# Patient Record
Sex: MALE | Race: WHITE | Employment: FULL TIME | URBAN - METROPOLITAN AREA
[De-identification: names, ages, dates, MRNs, and addresses within clinical notes are randomized per-mention and may not be internally consistent; named-entity substitution may affect disease eponyms.]

---

## 2017-03-30 ENCOUNTER — OFFICE VISIT (OUTPATIENT)
Dept: PHARMACY | Facility: CLINIC | Age: 62
End: 2017-03-30
Payer: COMMERCIAL

## 2017-03-30 VITALS
BODY MASS INDEX: 37.69 KG/M2 | SYSTOLIC BLOOD PRESSURE: 118 MMHG | OXYGEN SATURATION: 98 % | HEART RATE: 89 BPM | WEIGHT: 253.4 LBS | DIASTOLIC BLOOD PRESSURE: 68 MMHG

## 2017-03-30 DIAGNOSIS — E53.8 VITAMIN B12 DEFICIENCY (NON ANEMIC): ICD-10-CM

## 2017-03-30 DIAGNOSIS — E11.9 TYPE 2 DIABETES MELLITUS WITHOUT COMPLICATION, WITHOUT LONG-TERM CURRENT USE OF INSULIN (H): ICD-10-CM

## 2017-03-30 DIAGNOSIS — I10 HYPERTENSION GOAL BP (BLOOD PRESSURE) < 140/90: ICD-10-CM

## 2017-03-30 DIAGNOSIS — E78.5 HYPERLIPIDEMIA WITH TARGET LDL LESS THAN 100: ICD-10-CM

## 2017-03-30 DIAGNOSIS — E55.9 VITAMIN D DEFICIENCY: ICD-10-CM

## 2017-03-30 DIAGNOSIS — E11.9 TYPE 2 DIABETES MELLITUS WITHOUT COMPLICATION, WITHOUT LONG-TERM CURRENT USE OF INSULIN (H): Primary | ICD-10-CM

## 2017-03-30 LAB
ALBUMIN SERPL-MCNC: 4.4 G/DL (ref 3.4–5)
ALP SERPL-CCNC: 73 U/L (ref 40–150)
ALT SERPL W P-5'-P-CCNC: 29 U/L (ref 0–70)
ANION GAP SERPL CALCULATED.3IONS-SCNC: 9 MMOL/L (ref 3–14)
AST SERPL W P-5'-P-CCNC: 13 U/L (ref 0–45)
BILIRUB SERPL-MCNC: 0.5 MG/DL (ref 0.2–1.3)
BUN SERPL-MCNC: 17 MG/DL (ref 7–30)
CALCIUM SERPL-MCNC: 9.5 MG/DL (ref 8.5–10.1)
CHLORIDE SERPL-SCNC: 105 MMOL/L (ref 94–109)
CHOLEST SERPL-MCNC: 231 MG/DL
CO2 SERPL-SCNC: 23 MMOL/L (ref 20–32)
CREAT SERPL-MCNC: 0.89 MG/DL (ref 0.66–1.25)
CREAT UR-MCNC: 58 MG/DL
GFR SERPL CREATININE-BSD FRML MDRD: 87 ML/MIN/1.7M2
GLUCOSE SERPL-MCNC: 170 MG/DL (ref 70–99)
HBA1C MFR BLD: 8.2 % (ref 4.3–6)
HDLC SERPL-MCNC: 47 MG/DL
LDLC SERPL CALC-MCNC: 158 MG/DL
MICROALBUMIN UR-MCNC: 11 MG/L
MICROALBUMIN/CREAT UR: 18.56 MG/G CR (ref 0–17)
NONHDLC SERPL-MCNC: 184 MG/DL
POTASSIUM SERPL-SCNC: 4.6 MMOL/L (ref 3.4–5.3)
PROT SERPL-MCNC: 8.1 G/DL (ref 6.8–8.8)
SODIUM SERPL-SCNC: 137 MMOL/L (ref 133–144)
TRIGL SERPL-MCNC: 128 MG/DL
TSH SERPL DL<=0.005 MIU/L-ACNC: 1.03 MU/L (ref 0.4–4)
VIT B12 SERPL-MCNC: 421 PG/ML (ref 193–986)

## 2017-03-30 PROCEDURE — 80053 COMPREHEN METABOLIC PANEL: CPT | Performed by: FAMILY MEDICINE

## 2017-03-30 PROCEDURE — 80061 LIPID PANEL: CPT | Performed by: FAMILY MEDICINE

## 2017-03-30 PROCEDURE — 82306 VITAMIN D 25 HYDROXY: CPT | Performed by: FAMILY MEDICINE

## 2017-03-30 PROCEDURE — 82043 UR ALBUMIN QUANTITATIVE: CPT | Performed by: FAMILY MEDICINE

## 2017-03-30 PROCEDURE — 83036 HEMOGLOBIN GLYCOSYLATED A1C: CPT | Performed by: FAMILY MEDICINE

## 2017-03-30 PROCEDURE — 99607 MTMS BY PHARM ADDL 15 MIN: CPT | Performed by: PHARMACIST

## 2017-03-30 PROCEDURE — 99605 MTMS BY PHARM NP 15 MIN: CPT | Performed by: PHARMACIST

## 2017-03-30 PROCEDURE — 82607 VITAMIN B-12: CPT | Performed by: FAMILY MEDICINE

## 2017-03-30 PROCEDURE — 36415 COLL VENOUS BLD VENIPUNCTURE: CPT | Performed by: FAMILY MEDICINE

## 2017-03-30 PROCEDURE — 84443 ASSAY THYROID STIM HORMONE: CPT | Performed by: FAMILY MEDICINE

## 2017-03-30 RX ORDER — PIOGLITAZONEHYDROCHLORIDE 15 MG/1
15 TABLET ORAL DAILY
Qty: 90 TABLET | Refills: 1 | Status: SHIPPED | OUTPATIENT
Start: 2017-03-30 | End: 2018-05-03 | Stop reason: DRUGHIGH

## 2017-03-30 NOTE — PROGRESS NOTES
SUBJECTIVE/OBJECTIVE:                                                    Bigg Lilly is a 61 year old male coming in for a follow-up visit for Medication Therapy Management.  He was referred to me from Dr. Owen.    Chief Complaint: Follow up from our visit on  8-18-16.  Here for fasting labs  --6 months f/up.         Personal Healthcare Goals:  Ultimate goal is lose enough weight and eat healthy enough and exercise daily to work his way off all his current RX medications.! < 250lbs. is weight goal.     Tobacco: No tobacco use     Medication Adherence:  No issues reported by patient    Diabetes:  Pt currently taking ER metformin 500mg.-2 bid + Jardiance 10mg /day  .  Pt is not experiencing side effects: increased thirst --drinking more water   SMBG: no meter yet-declines today    Ranges: n/a     Symptoms of low blood sugar? none. Frequency of hypoglycemia? never.  Recent symptoms of high blood sugar? none.  Eye exam: up to date  Foot exam: due  Microalbumin is wnl and he is on an ace inhibitor.  Aspirin: 81mg asa daily now  He declines flu this year.  Diet/Exercise: walking daily, sometimes weights at [employer]--Breakfast Eggs, lunch: salads with chicken, or sandwiches, Dinner: tacos, chicken, green beans, No bedtime snack.   Eliminated sugar drinks, diet is same. Drinking more water!    Bread is his weak link.     Family membership at [email or web address]--daniela is going with him,    They go 1-2 x week - 1.5-2 hrs. - walking treadmill , mild strength training --upper body stuff.           Hypertension: Current medications include lotrel 10-20qam.  Patient does not self-monitor BP.  Patient reports no current medication side effects.        Hyperlipidemia: Current therapy includes diet and weight loss.  We discussed DM statin lipid guidelines --pt. Does not want to start any lipid meds at this time. .     Vitamin D3: level was 674.-he takes 2,000 iu's /day .  Vitamin D3 helps support bone health, strengthen the  immune system, and may improve mood and lessen muscle aches/pains if deficient.  Vitamin B12: level was 62-he takes 500mcg./day otc pill. Vitamin B12 helps support memory and lessens fatigue.              Current labs include:  BP Readings from Last 3 Encounters:   03/30/17 118/68   09/23/16 126/70   08/18/16 132/76         Lab Results   Component Value Date    A1C 8.2 03/30/2017    A1C 7.5 09/23/2016    A1C 7.4 08/18/2016    A1C 8.3 04/21/2016    A1C 7.3 11/02/2015           Cholesterol   Date Value Ref Range Status   2/6/2015 179  <200 mg/dL Final      LDL Cholesterol is the primary guide to therapy.   The NCEP recommends further evaluation of: patients with cholesterol greater   than 200 mg/dL if additional risk factors are present, cholesterol greater   than   240 mg/dL, triglycerides greater than 150 mg/dL, or HDL less than 40 mg/dL.     12/2/2013 192  0 - 200 mg/dL Final      LDL Cholesterol is the primary guide to therapy.       The NCEP recommends further evaluation of: patients with cholesterol greater       than 200 mg/dL if additional risk factors are present, cholesterol greater       than       240 mg/dL, triglycerides greater than 150 mg/dL, or HDL less than 40 mg/dL.     HDL Cholesterol   Date Value Ref Range Status   2/6/2015 44  >40 mg/dL Final   12/2/2013 43  40 - 110 mg/dL Final     LDL Cholesterol Calculated   Date Value Ref Range Status   2/6/2015 110  0 - 129 mg/dL Final      LDL Cholesterol is the primary guide to therapy: LDL-cholesterol goal in high   risk patients is <100 mg/dL and in very high risk patients is <70 mg/dL.     12/2/2013 110  0 - 129 mg/dL Final      LDL Cholesterol is the primary guide to therapy: LDL-cholesterol goal in high       risk patients is <100 mg/dL and in very high risk patients is <70 mg/dL.     Triglycerides   Date Value Ref Range Status   2/6/2015 127  0 - 150 mg/dL Final      Fasting specimen   12/2/2013 198* 0 - 150 mg/dL Final     Cholesterol/HDL Ratio    Date Value Ref Range Status   2/6/2015 4.1  0.0 - 5.0 Final   12/2/2013 4.5  0.0 - 5.0 Final     Cholesterol   Date Value Ref Range Status   09/23/2016 224 (H) <200 mg/dL Final     Comment:     Desirable:       <200 mg/dl   04/21/2016 197 <200 mg/dL Final     HDL Cholesterol   Date Value Ref Range Status   09/23/2016 43 >39 mg/dL Final   04/21/2016 43 >39 mg/dL Final     LDL Cholesterol Calculated   Date Value Ref Range Status   09/23/2016 150 (H) <100 mg/dL Final     Comment:     Above desirable:  100-129 mg/dl   Borderline High:  130-159 mg/dL   High:             160-189 mg/dL   Very high:       >189 mg/dl     04/21/2016 128 (H) <100 mg/dL Final     Comment:     Above desirable:  100-129 mg/dl   Borderline High:  130-159 mg/dL   High:             160-189 mg/dL   Very high:       >189 mg/dl       Triglycerides   Date Value Ref Range Status   09/23/2016 155 (H) <150 mg/dL Final     Comment:     Borderline high:  150-199 mg/dl   High:             200-499 mg/dl   Very high:       >499 mg/dl   Fasting specimen     04/21/2016 128 <150 mg/dL Final     Comment:     Fasting specimen     Cholesterol/HDL Ratio   Date Value Ref Range Status   11/02/2015 4.4 0.0 - 5.0 Final   06/12/2015 4.5 0.0 - 5.0 Final         MICROL        9   4/21/2016  MICROALBUMIN     6.56   4/21/2016        Last Basic Metabolic Panel:  NA      139   6/7/2016   POTASSIUM      4.9   6/7/2016  CHLORIDE      106   6/7/2016  JUAN      9.2   6/7/2016  CO2       26   6/7/2016  BUN       12   6/7/2016  CR     0.87   6/7/2016  GLC      203   6/7/2016         GFR Estimate   Date Value Range Status   12/2/2013 85  >60 mL/min/1.7m2 Final   9/12/2012 85  >60 mL/min/1.7m2 Final   8/3/2009 82  >60 mL/min/1.7m2 Final     GFR Estimate If Black   Date Value Range Status   12/2/2013 >90  >60 mL/min/1.7m2 Final   9/12/2012 >90  >60 mL/min/1.7m2 Final   8/3/2009 >90  >60 mL/min/1.7m2 Final       TSH   Date Value Range Status   12/2/2013 1.85  0.4 - 5.0 mU/L Final    ]    /68  Pulse 89  Wt 253 lb 6.4 oz (114.9 kg)  SpO2 98%  BMI 37.69 kg/m2    Most Recent Immunizations   Administered Date(s) Administered     TD (ADULT, 7+) 04/14/2000     TDAP Vaccine (Boostrix) 04/23/2014       ASSESSMENT:                                                    Current medications were reviewed with him today.      Medication Adherence: Issues identified       Diabetes: Needs Improvement. Patient is not meeting A1c goal of < 7%. Self monitoring of blood glucose is not something he is interested in doing. Pt would benefit from Metformin :  stay on the same dose.  Actos:  Start at dose : 15mg. /day .  SGLT2 inhibitor (Jardiance) :  stay on the same dose.  Increased exercise as tolerated  Updated Hemoglobin A1c--8.2% today .   Weight loss recommended--see plan . Less bread and carbs.         Hypertension: Stable. Patient is meeting BP goal of < 140/90mmHg.       Hyperlipidemia: Pt. Declines any statin drug at this time , lipids all pretty good--rechecking lab today result is pending.     Vitamin D3: is not at goal of 50-75ng/mL. Pt would benefit from vitamin D3 lab recheck today --result is pending.  Vitamin B12: is at goal of 600-1000pg/mL. Pt would benefit from vitamin B12 lab recheck today -result is pending.      PLAN:                                                            1.  A1c today is = 8.2% .     Lets keep you on Metformin and Jardiance as is --lets ADD once daily Pioglitazone 15mg. To your regimen.     Keep working on low-carb diet(cut bread) , stay active , watch weight--check scale daily !     2. Please watch my chart for other lab results/plan.       Next MTM visit:  3 months - Thursday July 13th at 11;30 am for A1c recheck.             To schedule another MTM appointment, please call the clinic directly or you may call the MTM scheduling line at [phone] or toll-free at [phone].     My Clinical Pharmacist's contact information:                                                       It was a pleasure seeing you today!  Please feel free to contact me with any questions or concerns you have.      Mally Oakley Rph.  Medication Therapy Management Provider  [phone]    You may receive a survey about the MTM services you received.  I would appreciate your feedback to help me serve you better in the future. Please fill it out and return it when you can. Your comments will be anonymous.

## 2017-03-30 NOTE — MR AVS SNAPSHOT
After Visit Summary   3/30/2017    Bigg Lilly    MRN: [id number]           Patient Information     Date Of Birth          1955        Visit Information        Provider Department      3/30/2017 11:30 AM Malyl Oakley RPH [facility]        Today's Diagnoses     Type 2 diabetes mellitus without complication, without long-term current use of insulin (H)    -  1    Hyperlipidemia with target LDL less than 100        Vitamin D deficiency        Vitamin B12 deficiency (non anemic)          Care Instructions    Recommendations from today's MT visit:                                                        1.  A1c today is = 8.2% .     Lets keep you on Metformin and Jardiance as is --lets ADD once daily Pioglitazone 15mg. To your regimen.             Keep working on low-carb diet(cut bread) , stay active , watch weight--check scale daily !           Next MTM visit:  3 months - Thursday July 13th at 11;30 am for A1c recheck.     To schedule another MT appointment, please call the clinic directly or you may call the MTM scheduling line at [phone] or toll-free at [phone].     My Clinical Pharmacist's contact information:                                                      It was a pleasure seeing you today!  Please feel free to contact me with any questions or concerns you have.      Mally Oakley Rph.  Medication Therapy Management Provider  [phone]      You may receive a survey about the [facility] services you received.  I would appreciate your feedback to help me serve you better in the future. Please fill it out and return it when you can. Your comments will be anonymous.      My healthcare goals:                                                                    Follow-ups after your visit        Your next 10 appointments already scheduled     Jul 13, 2017 11:30 AM CDT   SHORT with Mally Oakley RPH   [facility] ([facility] [address] [facility] [address] [phone]              Who to contact     If you have questions or need follow up information about today's clinic visit or your schedule please contact [facility] MTM directly at [phone].  Normal or non-critical lab and imaging results will be communicated to you by MyChart, letter or phone within 4 business days after the clinic has received the results. If you do not hear from us within 7 days, please contact the clinic through [employer] or phone. If you have a critical or abnormal lab result, we will notify you by phone as soon as possible.  Submit refill requests through [employer] or call your pharmacy and they will forward the refill request to us. Please allow 3 business days for your refill to be completed.          Additional Information About Your Visit        MyChart Information     [employer] gives you secure access to your electronic health record. If you see a primary care provider, you can also send messages to your care team and make appointments. If you have questions, please call your primary care clinic.  If you do not have a primary care provider, please call [phone] and they will assist you.        Care EveryWhere ID     This is your Care EveryWhere ID. This could be used by other organizations to access your [place] medical records  [id number]        Your Vitals Were     Pulse Pulse Oximetry BMI (Body Mass Index)             89 98% 37.69 kg/m2          Blood Pressure from Last 3 Encounters:   03/30/17 118/68   09/23/16 126/70   08/18/16 132/76    Weight from Last 3 Encounters:   03/30/17 253 lb 6.4 oz (114.9 kg)   09/23/16 245 lb (111.1 kg)   08/18/16 253 lb (114.8 kg)                 Today's Medication Changes          These changes are accurate as of: 3/30/17 12:36 PM.  If you have any questions, ask your nurse or doctor.               Start taking these medicines.        Dose/Directions    pioglitazone 15 MG  tablet   Commonly known as:  ACTOS   Used for:  Type 2 diabetes mellitus without complication, without long-term current use of insulin (H)   Started by:  Mally Oakley RPH        Dose:  15 mg   Take 1 tablet (15 mg) by mouth daily   Quantity:  90 tablet   Refills:  1            Where to get your medicines      These medications were sent to [id number] - [place], [place] - [address], [facility] [address]     Phone:  [phone]     pioglitazone 15 MG tablet                Primary Care Provider Office Phone # Fax #    Ramon Owen [phone]       [address] [address]        Thank you!     Thank you for choosing [facility]  for your care. Our goal is always to provide you with excellent care. Hearing back from our patients is one way we can continue to improve our services. Please take a few minutes to complete the written survey that you may receive in the mail after your visit with us. Thank you!             Your Updated Medication List - Protect others around you: Learn how to safely use, store and throw away your medicines at www.disposemymeds.org.          This list is accurate as of: 3/30/17 12:36 PM.  Always use your most recent med list.                   Brand Name Dispense Instructions for use    amLODIPine-benazepril 10-20 MG per capsule    LOTREL    90 capsule    Take 1 capsule by mouth daily       aspirin 81 MG tablet      Take 81 mg by mouth daily       cyanocolbalamin 500 MCG tablet    vitamin  B-12     Take 500 mcg by mouth daily       empagliflozin 10 MG Tabs tablet    JARDIANCE    30 tablet    Take 1 tablet (10 mg) by mouth daily       metFORMIN 500 MG 24 hr tablet    GLUCOPHAGE-XR    360 tablet    Take 2 tablets (1,000 mg) by mouth 2 times daily (with meals)       pioglitazone 15 MG tablet    ACTOS    90 tablet    Take 1 tablet (15 mg) by mouth daily       Vitamin D3  2000 UNITS Tabs      Take 1,000 Units by mouth daily

## 2017-03-30 NOTE — PATIENT INSTRUCTIONS
Recommendations from today's MTM visit:                                                        1.  A1c today is = 8.2% .     Lets keep you on Metformin and Jardiance as is --lets ADD once daily Pioglitazone 15mg. To your regimen.       2. Please watch my chart for other lab results/plan.       Keep working on low-carb diet(cut bread) , stay active , watch weight--check scale daily !           Next MTM visit:  3 months - Thursday July 13th at 11;30 am for A1c recheck.     To schedule another MTM appointment, please call the clinic directly or you may call the MTM scheduling line at [phone] or toll-free at [phone].     My Clinical Pharmacist's contact information:                                                      It was a pleasure seeing you today!  Please feel free to contact me with any questions or concerns you have.      Mally Oakley [facility].  Medication Therapy Management Provider  [phone]      You may receive a survey about the MTM services you received.  I would appreciate your feedback to help me serve you better in the future. Please fill it out and return it when you can. Your comments will be anonymous.      My healthcare goals:

## 2017-03-31 LAB — DEPRECATED CALCIDIOL+CALCIFEROL SERPL-MC: 24 UG/L (ref 20–75)

## 2017-03-31 NOTE — PROGRESS NOTES
3-31-17    Sent my chart lab result to patient --increase daily dose now to 2x2,000 iu's/day .    Mally Oakley Rph.  Medication Therapy Management Provider  [phone]

## 2017-05-08 ENCOUNTER — TELEPHONE (OUTPATIENT)
Dept: FAMILY MEDICINE | Facility: CLINIC | Age: 62
End: 2017-05-08

## 2017-05-08 DIAGNOSIS — E11.9 TYPE 2 DIABETES MELLITUS WITHOUT COMPLICATION, WITHOUT LONG-TERM CURRENT USE OF INSULIN (H): Primary | ICD-10-CM

## 2017-05-08 DIAGNOSIS — E66.01 MORBID OBESITY (H): ICD-10-CM

## 2017-05-08 NOTE — TELEPHONE ENCOUNTER
A referral is recommended for health  in regards to diabetes and .  Referral written and sent to health  to contact patient.    Thanks  Swati Márquez/JORGE  [place]

## 2017-05-15 DIAGNOSIS — E11.9 TYPE 2 DIABETES MELLITUS WITHOUT COMPLICATION, WITHOUT LONG-TERM CURRENT USE OF INSULIN (H): Primary | ICD-10-CM

## 2017-05-15 NOTE — LETTER
[facility]  [address] [place] [address]  Phone: [phone]    05/18/17    Bigg Lilly  [address] [facility] [address]      To whom it may concern:     We recently received a call from your pharmacy requesting a refill of your medication.    A review of your chart indicates that an appointment is required with your provider for office visit. Please call the clinic at [phone] to schedule your appointment.    We have authorized one refill of your medication to allow time for you to schedule your appointment.    Taking care of your health is important to us, and ongoing visits with your provider are vital to your care.  We look forward to seeing you in the near future.          Sincerely,      Ramon Owen MD/Karli SOLORIO RN

## 2017-05-15 NOTE — TELEPHONE ENCOUNTER
Pending Prescriptions:                       Disp   Refills    JARDIANCE 10 MG TABS tablet [Pharmacy Med*30 tab*10           Sig: TAKE 1 TABLET (10 MG) BY MOUTH DAILY              Last Written Prescription Date:  4/21/2016  Last Fill Quantity: 30,   # refills: 11  Last Office Visit with [facility] provider: 9/23/2016Dia        Future Office visit:    Next 5 appointments (look out 90 days)     Jul 13, 2017 11:30 AM CDT   SHORT with Mally Oakley RPH   [facility] ([facility])    [address] [facility] [address]   [phone]                   Routing refill request to provider for review/approval because:  Drug not on the [facility] or  Health refill protocol or controlled substance

## 2017-05-16 NOTE — TELEPHONE ENCOUNTER
Next 5 appointments (look out 90 days)     Jul 13, 2017 11:30 AM CDT   SHORT with Mally Oakley RPH   [facility] ([facility])    [address]  [facility] [address] [phone]                   BP Readings from Last 3 Encounters:   03/30/17 118/68   09/23/16 126/70   08/18/16 132/76     Lab Results   Component Value Date    MICROL 11 03/30/2017     Lab Results   Component Value Date    UMALCR 18.56 03/30/2017     Creatinine   Date Value Ref Range Status   03/30/2017 0.89 0.66 - 1.25 mg/dL Final   ]  GFR Estimate   Date Value Ref Range Status   03/30/2017 87 >60 mL/min/1.7m2 Final     Comment:     Non  GFR Calc   06/07/2016 89 >60 mL/min/1.7m2 Final     Comment:     Non  GFR Calc   04/21/2016 89 >60 mL/min/1.7m2 Final     Comment:     Non  GFR Calc     GFR Estimate If Black   Date Value Ref Range Status   03/30/2017 >90   GFR Calc   >60 mL/min/1.7m2 Final   06/07/2016 >90   GFR Calc   >60 mL/min/1.7m2 Final   04/21/2016 >90   GFR Calc   >60 mL/min/1.7m2 Final     Lab Results   Component Value Date    CHOL 231 03/30/2017     Lab Results   Component Value Date    HDL 47 03/30/2017     Lab Results   Component Value Date     03/30/2017     Lab Results   Component Value Date    TRIG 128 03/30/2017     Lab Results   Component Value Date    CHOLHDLRATIO 4.4 11/02/2015     Lab Results   Component Value Date    AST 13 03/30/2017     Lab Results   Component Value Date    ALT 29 03/30/2017     Lab Results   Component Value Date    A1C 8.2 03/30/2017    A1C 7.5 09/23/2016    A1C 7.4 08/18/2016    A1C 8.3 04/21/2016    A1C 7.3 11/02/2015     Potassium   Date Value Ref Range Status   03/30/2017 4.6 3.4 - 5.3 mmol/L Final     Routing refill request to provider for review/approval because:  Requires OV q 6 mo    Karli Lilly RN, BS  Clinical Nurse Triage.

## 2017-05-17 RX ORDER — EMPAGLIFLOZIN 10 MG/1
TABLET, FILM COATED ORAL
Qty: 30 TABLET | Refills: 0 | Status: SHIPPED | OUTPATIENT
Start: 2017-05-17 | End: 2017-07-24

## 2017-06-05 ENCOUNTER — MYC MEDICAL ADVICE (OUTPATIENT)
Dept: PHARMACY | Facility: CLINIC | Age: 62
End: 2017-06-05

## 2017-06-05 DIAGNOSIS — E11.9 TYPE 2 DIABETES MELLITUS WITHOUT COMPLICATION, WITHOUT LONG-TERM CURRENT USE OF INSULIN (H): Primary | ICD-10-CM

## 2017-06-06 ENCOUNTER — MYC MEDICAL ADVICE (OUTPATIENT)
Dept: PHARMACY | Facility: CLINIC | Age: 62
End: 2017-06-06

## 2017-06-06 RX ORDER — DAPAGLIFLOZIN 10 MG/1
10 TABLET, FILM COATED ORAL DAILY
Qty: 90 TABLET | Refills: 1 | Status: SHIPPED | OUTPATIENT
Start: 2017-06-06 | End: 2018-06-18

## 2017-06-06 NOTE — TELEPHONE ENCOUNTER
6-6-17      mt notes now will start Farxiga with rx savings card.     Mally Oakley lesa.  Medication Therapy Management Provider  [phone]

## 2017-06-06 NOTE — TELEPHONE ENCOUNTER
6-6-17      mtm will change patient to Farxiga 10mg /day with rx savings coupon for 0.00$$ copay .    Pt. Needs appt. With dr. seth -yearly exam .    Mally Oakley Rph.  Medication Therapy Management Provider  [phone]

## 2017-06-15 ENCOUNTER — TELEPHONE (OUTPATIENT)
Dept: NURSING | Facility: CLINIC | Age: 62
End: 2017-06-15

## 2017-06-15 NOTE — TELEPHONE ENCOUNTER
Was able to connect with patient.  States that his isn't interested in Health Coaching at the moment.  He is already working with Mally MARTIN on his diabetes has a physical scheduled with Dr. Owen.  Might be interested down the road. Will outreach again in a few months to check in.

## 2017-07-24 ENCOUNTER — OFFICE VISIT (OUTPATIENT)
Dept: FAMILY MEDICINE | Facility: CLINIC | Age: 62
End: 2017-07-24
Payer: COMMERCIAL

## 2017-07-24 VITALS
BODY MASS INDEX: 36.88 KG/M2 | RESPIRATION RATE: 16 BRPM | WEIGHT: 249 LBS | HEART RATE: 76 BPM | TEMPERATURE: 98 F | SYSTOLIC BLOOD PRESSURE: 136 MMHG | DIASTOLIC BLOOD PRESSURE: 79 MMHG | HEIGHT: 69 IN

## 2017-07-24 DIAGNOSIS — E78.5 HYPERLIPIDEMIA WITH TARGET LDL LESS THAN 100: ICD-10-CM

## 2017-07-24 DIAGNOSIS — Z23 NEED FOR PNEUMOCOCCAL VACCINE: ICD-10-CM

## 2017-07-24 DIAGNOSIS — Z00.00 ROUTINE GENERAL MEDICAL EXAMINATION AT A HEALTH CARE FACILITY: Primary | ICD-10-CM

## 2017-07-24 DIAGNOSIS — Z12.5 SPECIAL SCREENING FOR MALIGNANT NEOPLASM OF PROSTATE: ICD-10-CM

## 2017-07-24 LAB
ALBUMIN SERPL-MCNC: 4 G/DL (ref 3.4–5)
ALP SERPL-CCNC: 72 U/L (ref 40–150)
ALT SERPL W P-5'-P-CCNC: 26 U/L (ref 0–70)
ANION GAP SERPL CALCULATED.3IONS-SCNC: 6 MMOL/L (ref 3–14)
AST SERPL W P-5'-P-CCNC: 21 U/L (ref 0–45)
BILIRUB SERPL-MCNC: 0.7 MG/DL (ref 0.2–1.3)
BUN SERPL-MCNC: 15 MG/DL (ref 7–30)
CALCIUM SERPL-MCNC: 8.7 MG/DL (ref 8.5–10.1)
CHLORIDE SERPL-SCNC: 107 MMOL/L (ref 94–109)
CHOLEST SERPL-MCNC: 187 MG/DL
CO2 SERPL-SCNC: 26 MMOL/L (ref 20–32)
CREAT SERPL-MCNC: 0.87 MG/DL (ref 0.66–1.25)
GFR SERPL CREATININE-BSD FRML MDRD: 89 ML/MIN/1.7M2
GLUCOSE SERPL-MCNC: 154 MG/DL (ref 70–99)
HBA1C MFR BLD: 7.6 % (ref 4.3–6)
HDLC SERPL-MCNC: 42 MG/DL
LDLC SERPL CALC-MCNC: 123 MG/DL
NONHDLC SERPL-MCNC: 145 MG/DL
POTASSIUM SERPL-SCNC: 4.5 MMOL/L (ref 3.4–5.3)
PROT SERPL-MCNC: 7.8 G/DL (ref 6.8–8.8)
PSA SERPL-ACNC: 0.62 UG/L (ref 0–4)
SODIUM SERPL-SCNC: 139 MMOL/L (ref 133–144)
TRIGL SERPL-MCNC: 112 MG/DL

## 2017-07-24 PROCEDURE — 99396 PREV VISIT EST AGE 40-64: CPT | Mod: 25 | Performed by: FAMILY MEDICINE

## 2017-07-24 PROCEDURE — 36415 COLL VENOUS BLD VENIPUNCTURE: CPT | Performed by: FAMILY MEDICINE

## 2017-07-24 PROCEDURE — 83036 HEMOGLOBIN GLYCOSYLATED A1C: CPT | Performed by: FAMILY MEDICINE

## 2017-07-24 PROCEDURE — 80053 COMPREHEN METABOLIC PANEL: CPT | Performed by: FAMILY MEDICINE

## 2017-07-24 PROCEDURE — 90732 PPSV23 VACC 2 YRS+ SUBQ/IM: CPT | Performed by: FAMILY MEDICINE

## 2017-07-24 PROCEDURE — 90471 IMMUNIZATION ADMIN: CPT | Performed by: FAMILY MEDICINE

## 2017-07-24 PROCEDURE — G0103 PSA SCREENING: HCPCS | Performed by: FAMILY MEDICINE

## 2017-07-24 PROCEDURE — 99207 C FOOT EXAM  NO CHARGE: CPT | Mod: 25 | Performed by: FAMILY MEDICINE

## 2017-07-24 PROCEDURE — 80061 LIPID PANEL: CPT | Performed by: FAMILY MEDICINE

## 2017-07-24 RX ORDER — METFORMIN HCL 500 MG
1000 TABLET, EXTENDED RELEASE 24 HR ORAL 2 TIMES DAILY WITH MEALS
Qty: 360 TABLET | Refills: 1 | Status: SHIPPED | OUTPATIENT
Start: 2017-07-24 | End: 2018-08-18

## 2017-07-24 NOTE — NURSING NOTE
"Chief Complaint   Patient presents with     Physical     fasting       Initial /79 (BP Location: Right arm, Patient Position: Chair, Cuff Size: Adult Large)  Pulse 76  Temp 98  F (36.7  C) (Oral)  Resp 16  Ht 5' 8.75\" (1.746 m)  Wt 249 lb (112.9 kg)  BMI 37.04 kg/m2 Estimated body mass index is 37.04 kg/(m^2) as calculated from the following:    Height as of this encounter: 5' 8.75\" (1.746 m).    Weight as of this encounter: 249 lb (112.9 kg).  Medication Reconciliation: complete rt alan Triana MA        "

## 2017-07-24 NOTE — MR AVS SNAPSHOT
After Visit Summary   7/24/2017    Bigg Lilly    MRN: [id number]           Patient Information     Date Of Birth          1955        Visit Information        Provider Department      7/24/2017 1:00 PM Ramon Owen MD [facility]        Today's Diagnoses     Routine general medical examination at a health care facility    -  1    Uncontrolled type 2 diabetes mellitus without complication, without long-term current use of insulin (H)        Hyperlipidemia with target LDL less than 100        Special screening for malignant neoplasm of prostate        Need for pneumococcal vaccine          Care Instructions      Preventive Health Recommendations  Male Ages 50 - 64    Yearly exam:             See your health care provider every year in order to  o   Review health changes.   o   Discuss preventive care.    o   Review your medicines if your doctor has prescribed any.     Have a cholesterol test every 5 years, or more frequently if you are at risk for high cholesterol/heart disease.     Have a diabetes test (fasting glucose) every three years. If you are at risk for diabetes, you should have this test more often.     Have a colonoscopy at age 50, or have a yearly FIT test (stool test). These exams will check for colon cancer.      Talk with your health care provider about whether or not a prostate cancer screening test (PSA) is right for you.    You should be tested each year for STDs (sexually transmitted diseases), if you re at risk.     Shots: Get a flu shot each year. Get a tetanus shot every 10 years.     Nutrition:    Eat at least 5 servings of fruits and vegetables daily.     Eat whole-grain bread, whole-wheat pasta and brown rice instead of white grains and rice.     Talk to your provider about Calcium and Vitamin D.     Lifestyle    Exercise for at least 150 minutes a week (30 minutes a day, 5 days a week). This will help you control your weight and prevent disease.  "    Limit alcohol to one drink per day.     No smoking.     Wear sunscreen to prevent skin cancer.     See your dentist every six months for an exam and cleaning.     See your eye doctor every 1 to 2 years.            Follow-ups after your visit        Who to contact     If you have questions or need follow up information about today's clinic visit or your schedule please contact [facility] directly at [phone].  Normal or non-critical lab and imaging results will be communicated to you by [employer], letter or phone within 4 business days after the clinic has received the results. If you do not hear from us within 7 days, please contact the clinic through [employer] or phone. If you have a critical or abnormal lab result, we will notify you by phone as soon as possible.  Submit refill requests through [employer] or call your pharmacy and they will forward the refill request to us. Please allow 3 business days for your refill to be completed.          Additional Information About Your Visit        [email or web address] Information     [employer] gives you secure access to your electronic health record. If you see a primary care provider, you can also send messages to your care team and make appointments. If you have questions, please call your primary care clinic.  If you do not have a primary care provider, please call [phone] and they will assist you.        Care EveryWhere ID     This is your Care EveryWhere ID. This could be used by other organizations to access your [place] medical records  [id number]        Your Vitals Were     Pulse Temperature Respirations Height BMI (Body Mass Index)       76 98  F (36.7  C) (Oral) 16 5' 8.75\" (1.746 m) 37.04 kg/m2        Blood Pressure from Last 3 Encounters:   07/24/17 136/79   03/30/17 118/68   09/23/16 126/70    Weight from Last 3 Encounters:   07/24/17 249 lb (112.9 kg)   03/30/17 253 lb 6.4 oz (114.9 kg)   09/23/16 245 lb (111.1 kg)              We Performed the " Following     C FOOT EXAM  NO CHARGE     Comprehensive metabolic panel     Hemoglobin A1c     Lipid panel reflex to direct LDL     PNEUMOCOCCAL VACCINE,ADULT,SQ OR IM     PSA, screen          Today's Medication Changes          These changes are accurate as of: 7/24/17 11:59 PM.  If you have any questions, ask your nurse or doctor.               Stop taking these medicines if you haven't already. Please contact your care team if you have questions.     JARDIANCE 10 MG Tabs tablet   Generic drug:  empagliflozin   Stopped by:  Ramon Owen MD                Where to get your medicines      These medications were sent to [id number] - [place], [place] - [address]  [address], [facility] [address]     Phone:  [phone]     metFORMIN 500 MG 24 hr tablet                Primary Care Provider Office Phone # Fax #    Ramon Owen [phone]       [facility] [address] [place] [address]        Equal Access to Services     [facility] AH: Hadii aad ku hadasho Soomaali, waaxda luqadaha, qaybta kaalmada adeegyada, waxay idiin hayaan alexandraeg kharash larobert . So [place] [phone].    ATENCIÓN: Si habla espochoa, tiene a mcknight disposición servicios gratuitos de asistencia lingüística. Llame al [phone].    We comply with applicable federal civil rights laws and [place] laws. We do not discriminate on the basis of race, color, national origin, age, disability sex, sexual orientation or gender identity.            Thank you!     Thank you for choosing [facility]  for your care. Our goal is always to provide you with excellent care. Hearing back from our patients is one way we can continue to improve our services. Please take a few minutes to complete the written survey that you may receive in the mail after your visit with us. Thank you!             Your Updated Medication List - Protect others around you: Learn how to safely use,  store and throw away your medicines at www.disposemymeds.org.          This list is accurate as of: 7/24/17 11:59 PM.  Always use your most recent med list.                   Brand Name Dispense Instructions for use Diagnosis    amLODIPine-benazepril 10-20 MG per capsule    LOTREL    90 capsule    Take 1 capsule by mouth daily    Hypertension goal BP (blood pressure) < 140/90       aspirin 81 MG tablet      Take 81 mg by mouth daily        cyanocolbalamin 500 MCG tablet    vitamin  B-12     Take 500 mcg by mouth daily        dapagliflozin 10 MG Tabs tablet    FARXIGA    90 tablet    Take 1 tablet (10 mg) by mouth daily    Type 2 diabetes mellitus without complication, without long-term current use of insulin (H)       metFORMIN 500 MG 24 hr tablet    GLUCOPHAGE-XR    360 tablet    Take 2 tablets (1,000 mg) by mouth 2 times daily (with meals)    Uncontrolled type 2 diabetes mellitus without complication, without long-term current use of insulin (H)       pioglitazone 15 MG tablet    ACTOS    90 tablet    Take 1 tablet (15 mg) by mouth daily    Type 2 diabetes mellitus without complication, without long-term current use of insulin (H)       Vitamin D3 2000 UNITS Tabs      Take 1,000 Units by mouth daily

## 2017-07-24 NOTE — PROGRESS NOTES
SUBJECTIVE:   CC: Bigg Lilly is an 62 year old male who presents for preventative health visit.     Healthy Habits:    Do you get at least three servings of calcium containing foods daily (dairy, green leafy vegetables, etc.)? yes    Amount of exercise or daily activities, outside of work: 3-4 day(s) per week    Problems taking medications regularly No    Medication side effects: No    Have you had an eye exam in the past two years? yes    Do you see a dentist twice per year? yes  Do you have sleep apnea, excessive snoring or daytime drowsiness?no      Uncontrolled type 2 diabetes mellitus without complication, without long-term current use of insulin (H):  Lab Results   Component Value Date    A1C 8.2 03/30/2017    A1C 7.5 09/23/2016    A1C 7.4 08/18/2016    A1C 8.3 04/21/2016    A1C 7.3 11/02/2015         Hyperlipidemia with target LDL less than 100: The patient is intimidated by medical intervention.    The 10-year ASCVD risk score (Tetegina BROOKS Jr, et al., 2013) is: 25.6%    Values used to calculate the score:      Age: 62 years      Sex: Male      Is Non- : No      Diabetic: Yes      Tobacco smoker: No      Systolic Blood Pressure: 136 mmHg      Is BP treated: Yes      HDL Cholesterol: 47 mg/dL      Total Cholesterol: 231 mg/dL      Special screening for malignant neoplasm of prostate: Discussed, patient will get screened    Need for pneumococcal vaccine: Discussed, patient will receive.     Patient has a history of DVT, occurred 5-6 years ago.     Today's PHQ-2 Score:   PHQ-2 ( 1999 Pfizer) 9/23/2016 2/26/2015   Q1: Little interest or pleasure in doing things 0 0   Q2: Feeling down, depressed or hopeless 0 0   PHQ-2 Score 0 0         Abuse: Current or Past(Physical, Sexual or Emotional)- No  Do you feel safe in your environment - Yes    Last PSA:   PSA   Date Value Ref Range Status   12/02/2013 0.52 0 - 4 ug/L Final       Reviewed orders with patient. Reviewed health maintenance and  updated orders accordingly -       Reviewed and updated as needed this visit by clinical staff  Tobacco  Allergies  Meds  Med Hx  Surg Hx  Fam Hx  Soc Hx       Past Medical History:   Diagnosis Date     Chest pain, unspecified     dr roth     DM type 2, goal A1C 7-8 2014     DVT (deep venous thrombosis) 11/15/2010     Hyperlipidemia LDL goal < 100 2014     Hypertension goal BP (blood pressure) < 140/90 2012     Morbid obesity (H) 2014     OBESITY NOS 2007     Stasis dermatitis of both legs 2014     Tinea pedis 2014     Type 2 diabetes mellitus without complications (H) 10/12/2015       Past Surgical History:   Procedure Laterality Date     C NONSPECIFIC PROCEDURE      PILONIDAL CYST     HC COLONOSCOPY THRU STOMA, DIAGNOSTIC      neg       Family History   Problem Relation Age of Onset     CEREBROVASCULAR DISEASE Mother       age 60     Hypertension Father      HEART DISEASE Father       age 63 Heartattack     HEART DISEASE Paternal Grandfather       age 55 heartattack       Social History   Substance Use Topics     Smoking status: Never Smoker     Smokeless tobacco: Never Used     Alcohol use 2.0 oz/week      Comment: occasional         Reviewed and updated as needed this visit by Provider         ROS:  C: NEGATIVE for fever, chills, change in weight  I: NEGATIVE for worrisome rashes, moles or lesions  E: NEGATIVE for vision changes or irritation  ENT: NEGATIVE for dysphagia  R: NEGATIVE for significant cough or SOB  CV: NEGATIVE for chest pain, palpitations or peripheral edema  GI: NEGATIVE for heartburn or change in bowel habits   male: negative for dysuria, nocturia  M: NEGATIVE for significant arthralgias or myalgia  N: NEGATIVE for weakness, dizziness or paresthesias      This document serves as a record of the services and decisions personally performed and made by Ramon Owen MD. It was created on his behalf by Evonne Connelly, a trained  "medical scribe.  The creation of this document is based on the scribe's personal observations and the provider's statements to the medical scribe.  Evonne Connelly, July 24, 2017 1:23 PM    OBJECTIVE:   /79 (BP Location: Right arm, Patient Position: Chair, Cuff Size: Adult Large)  Pulse 76  Temp 98  F (36.7  C) (Oral)  Resp 16  Ht 1.746 m (5' 8.75\")  Wt 112.9 kg (249 lb)  BMI 37.04 kg/m2  EXAM:  GENERAL: healthy, alert and no distress  EYES: Eyes grossly normal to inspection, PERRL and conjunctivae and sclerae normal  HENT: ear canals and TM's normal, nose and mouth without ulcers or lesions  NECK: no adenopathy, no asymmetry, masses, or scars and thyroid normal to palpation  RESP: lungs clear to auscultation - no rales, rhonchi or wheezes  CV: regular rate and rhythm, normal S1 S2, no S3 or S4, no murmur, click or rub, no pitting edema and peripheral pulses strong  ABDOMEN: soft, nontender, no hepatosplenomegaly, no masses and bowel sounds normal  MS: no gross musculoskeletal defects noted, no edema  SKIN: no suspicious lesions or rashes  NEURO: Normal strength and tone, mentation intact and speech normal.   FOOT EXAM: Feet are warm and dry. Pedal pulses palpable. No LE edema. Skin intact.  PSYCH: mentation appears normal, affect normal/bright    ASSESSMENT/PLAN:       ASSESSMENT / PLAN:  (Z00.00) Routine general medical examination at a health care facility  (primary encounter diagnosis)  Comment: Return annually for px      (E11.65) Uncontrolled type 2 diabetes mellitus without complication, without long-term current use of insulin (H)  Comment: discussed  Plan: Hemoglobin A1c, C FOOT EXAM  NO CHARGE,         Comprehensive metabolic panel, metFORMIN         (GLUCOPHAGE-XR) 500 MG 24 hr tablet, Lipid         panel reflex to direct LDL            (E78.5) Hyperlipidemia with target LDL less than 100  Comment: [place] database.  Pt will consider  Plan: Lipid panel reflex to direct LDL, CANCELED:         " "Lipid panel reflex to direct LDL. Discussed Symptoms, angina, ED if needed            (Z12.5) Special screening for malignant neoplasm of prostate  Comment: Will test. Discussed  Plan: PSA, screen            (Z23) Need for pneumococcal vaccine  Comment: Will receive  Plan: PNEUMOCOCCAL VACCINE,ADULT,SQ OR IM           RTC 3 months    COUNSELING:  Reviewed preventive health counseling, as reflected in patient instructions       Immunizations    Vaccinated for: Pneumococcal           Prostate cancer screening   Advanced directives            reports that he has never smoked. He has never used smokeless tobacco.      Estimated body mass index is 37.04 kg/(m^2) as calculated from the following:    Height as of this encounter: 1.746 m (5' 8.75\").    Weight as of this encounter: 112.9 kg (249 lb).       Counseling Resources:  ATP IV Guidelines  Pooled Cohorts Equation Calculator  FRAX Risk Assessment  ICSI Preventive Guidelines  Dietary Guidelines for Americans, 2010  USDA's MyPlate  ASA Prophylaxis  Lung CA Screening    The information in this document, created by the medical scribe for me, accurately reflects the services I personally performed and the decisions made by me. I have reviewed and approved this document for accuracy prior to leaving the patient care area.  Ramon Owen MD July 24, 2017 1:23 PM    Ramon Owen MD  [facility]"

## 2017-08-01 ENCOUNTER — TELEPHONE (OUTPATIENT)
Dept: NURSING | Facility: CLINIC | Age: 62
End: 2017-08-01

## 2017-08-02 DIAGNOSIS — I10 HYPERTENSION GOAL BP (BLOOD PRESSURE) < 140/90: ICD-10-CM

## 2017-08-02 NOTE — TELEPHONE ENCOUNTER
Amlodipine Besy-Benazepril HCl Oral Capsule 10-20 MG        Last Written Prescription Date: 05/22/16  Last Fill Quantity: 90, # refills: 3    Last Office Visit with G, P or [facility] prescribing provider:  07/24/17 Dr Owen   Future Office Visit:        BP Readings from Last 3 Encounters:   07/24/17 136/79   03/30/17 118/68   09/23/16 126/70

## 2017-08-03 RX ORDER — AMLODIPINE AND BENAZEPRIL HYDROCHLORIDE 10; 20 MG/1; MG/1
CAPSULE ORAL
Qty: 90 CAPSULE | Refills: 2 | Status: SHIPPED | OUTPATIENT
Start: 2017-08-03 | End: 2018-05-03

## 2017-08-03 NOTE — TELEPHONE ENCOUNTER
Medication approved per standing orders.     Annmarie Paniagua RN      Potassium   Date Value Ref Range Status   07/24/2017 4.5 3.4 - 5.3 mmol/L Final     Creatinine   Date Value Ref Range Status   07/24/2017 0.87 0.66 - 1.25 mg/dL Final     BP Readings from Last 3 Encounters:   07/24/17 136/79   03/30/17 118/68   09/23/16 126/70

## 2017-09-01 ENCOUNTER — TELEPHONE (OUTPATIENT)
Dept: NURSING | Facility: CLINIC | Age: 62
End: 2017-09-01

## 2017-09-25 ENCOUNTER — TRANSFERRED RECORDS (OUTPATIENT)
Dept: HEALTH INFORMATION MANAGEMENT | Facility: CLINIC | Age: 62
End: 2017-09-25

## 2017-10-03 DIAGNOSIS — E78.5 HYPERLIPIDEMIA WITH TARGET LDL LESS THAN 100: Primary | ICD-10-CM

## 2018-03-27 DIAGNOSIS — E55.9 VITAMIN D DEFICIENCY: ICD-10-CM

## 2018-03-27 DIAGNOSIS — E11.9 TYPE 2 DIABETES MELLITUS WITHOUT COMPLICATION, WITHOUT LONG-TERM CURRENT USE OF INSULIN (H): ICD-10-CM

## 2018-03-27 DIAGNOSIS — E78.5 HYPERLIPIDEMIA WITH TARGET LDL LESS THAN 100: ICD-10-CM

## 2018-03-27 DIAGNOSIS — E53.8 VITAMIN B12 DEFICIENCY (NON ANEMIC): ICD-10-CM

## 2018-03-27 LAB
ALBUMIN SERPL-MCNC: 3.8 G/DL (ref 3.4–5)
ALP SERPL-CCNC: 75 U/L (ref 40–150)
ALT SERPL W P-5'-P-CCNC: 24 U/L (ref 0–70)
ANION GAP SERPL CALCULATED.3IONS-SCNC: 9 MMOL/L (ref 3–14)
AST SERPL W P-5'-P-CCNC: 17 U/L (ref 0–45)
BILIRUB SERPL-MCNC: 0.4 MG/DL (ref 0.2–1.3)
BUN SERPL-MCNC: 12 MG/DL (ref 7–30)
CALCIUM SERPL-MCNC: 9.2 MG/DL (ref 8.5–10.1)
CHLORIDE SERPL-SCNC: 104 MMOL/L (ref 94–109)
CHOLEST SERPL-MCNC: 218 MG/DL
CO2 SERPL-SCNC: 26 MMOL/L (ref 20–32)
CREAT SERPL-MCNC: 0.87 MG/DL (ref 0.66–1.25)
CREAT UR-MCNC: 167 MG/DL
DEPRECATED CALCIDIOL+CALCIFEROL SERPL-MC: 26 UG/L (ref 20–75)
GFR SERPL CREATININE-BSD FRML MDRD: 89 ML/MIN/1.7M2
GLUCOSE SERPL-MCNC: 248 MG/DL (ref 70–99)
HBA1C MFR BLD: 9.1 % (ref 4.3–6)
HDLC SERPL-MCNC: 41 MG/DL
LDLC SERPL CALC-MCNC: 146 MG/DL
MICROALBUMIN UR-MCNC: 33 MG/L
MICROALBUMIN/CREAT UR: 20 MG/G CR (ref 0–17)
NONHDLC SERPL-MCNC: 177 MG/DL
POTASSIUM SERPL-SCNC: 4.1 MMOL/L (ref 3.4–5.3)
PROT SERPL-MCNC: 7.4 G/DL (ref 6.8–8.8)
SODIUM SERPL-SCNC: 139 MMOL/L (ref 133–144)
TRIGL SERPL-MCNC: 157 MG/DL
TSH SERPL DL<=0.005 MIU/L-ACNC: 1.99 MU/L (ref 0.4–4)
VIT B12 SERPL-MCNC: 414 PG/ML (ref 193–986)

## 2018-03-27 PROCEDURE — 83036 HEMOGLOBIN GLYCOSYLATED A1C: CPT | Performed by: FAMILY MEDICINE

## 2018-03-27 PROCEDURE — 80061 LIPID PANEL: CPT | Performed by: FAMILY MEDICINE

## 2018-03-27 PROCEDURE — 36415 COLL VENOUS BLD VENIPUNCTURE: CPT | Performed by: FAMILY MEDICINE

## 2018-03-27 PROCEDURE — 80053 COMPREHEN METABOLIC PANEL: CPT | Performed by: FAMILY MEDICINE

## 2018-03-27 PROCEDURE — 84443 ASSAY THYROID STIM HORMONE: CPT | Performed by: FAMILY MEDICINE

## 2018-03-27 PROCEDURE — 82607 VITAMIN B-12: CPT | Performed by: FAMILY MEDICINE

## 2018-03-27 PROCEDURE — 82306 VITAMIN D 25 HYDROXY: CPT | Performed by: FAMILY MEDICINE

## 2018-03-27 PROCEDURE — 82043 UR ALBUMIN QUANTITATIVE: CPT | Performed by: FAMILY MEDICINE

## 2018-03-28 PROBLEM — N18.2 CKD (CHRONIC KIDNEY DISEASE) STAGE 2, GFR 60-89 ML/MIN: Status: ACTIVE | Noted: 2018-03-28

## 2018-03-29 ENCOUNTER — MYC MEDICAL ADVICE (OUTPATIENT)
Dept: PHARMACY | Facility: CLINIC | Age: 63
End: 2018-03-29

## 2018-03-29 DIAGNOSIS — E78.5 HYPERLIPIDEMIA WITH TARGET LDL LESS THAN 100: Primary | ICD-10-CM

## 2018-04-02 NOTE — TELEPHONE ENCOUNTER
4-2-18      mtm notes patient back on farxiga , will discuss statin drug as well on 5-3-18 mtm f/up appt.    Mally Oakley Rph.  Medication Therapy Management Provider  [phone]

## 2018-05-03 ENCOUNTER — OFFICE VISIT (OUTPATIENT)
Dept: PHARMACY | Facility: CLINIC | Age: 63
End: 2018-05-03
Payer: COMMERCIAL

## 2018-05-03 VITALS
SYSTOLIC BLOOD PRESSURE: 138 MMHG | BODY MASS INDEX: 36.16 KG/M2 | WEIGHT: 243.1 LBS | HEART RATE: 68 BPM | DIASTOLIC BLOOD PRESSURE: 82 MMHG | OXYGEN SATURATION: 98 %

## 2018-05-03 DIAGNOSIS — I10 HYPERTENSION GOAL BP (BLOOD PRESSURE) < 140/90: ICD-10-CM

## 2018-05-03 DIAGNOSIS — E55.9 VITAMIN D DEFICIENCY: ICD-10-CM

## 2018-05-03 DIAGNOSIS — E78.5 HYPERLIPIDEMIA WITH TARGET LDL LESS THAN 100: ICD-10-CM

## 2018-05-03 DIAGNOSIS — E53.8 VITAMIN B12 DEFICIENCY (NON ANEMIC): ICD-10-CM

## 2018-05-03 DIAGNOSIS — N18.2 CKD (CHRONIC KIDNEY DISEASE) STAGE 2, GFR 60-89 ML/MIN: ICD-10-CM

## 2018-05-03 LAB — HBA1C MFR BLD: 8.2 % (ref 0–5.6)

## 2018-05-03 PROCEDURE — 36415 COLL VENOUS BLD VENIPUNCTURE: CPT | Performed by: FAMILY MEDICINE

## 2018-05-03 PROCEDURE — 83036 HEMOGLOBIN GLYCOSYLATED A1C: CPT | Performed by: FAMILY MEDICINE

## 2018-05-03 PROCEDURE — 99607 MTMS BY PHARM ADDL 15 MIN: CPT | Performed by: PHARMACIST

## 2018-05-03 PROCEDURE — 99605 MTMS BY PHARM NP 15 MIN: CPT | Performed by: PHARMACIST

## 2018-05-03 RX ORDER — AMLODIPINE AND BENAZEPRIL HYDROCHLORIDE 10; 20 MG/1; MG/1
CAPSULE ORAL
Qty: 90 CAPSULE | Refills: 1 | Status: SHIPPED | OUTPATIENT
Start: 2018-05-03 | End: 2018-09-27

## 2018-05-03 RX ORDER — PIOGLITAZONEHYDROCHLORIDE 30 MG/1
30 TABLET ORAL DAILY
Qty: 90 TABLET | Refills: 1 | Status: SHIPPED | OUTPATIENT
Start: 2018-05-03 | End: 2018-09-27

## 2018-05-03 NOTE — PROGRESS NOTES
SUBJECTIVE/OBJECTIVE:                                                    Bigg Lilly is a 62 year old male coming in for a follow-up visit (3-30-17)for Medication Therapy Management.  He was referred to me from Dr. Owen.    Today is his initial [facility] visit for 2018.     Chief Complaint:  Here for repeat A1c lab . Back on farxiga . New grand daughter in [place] --happy.           Personal Healthcare Goals:  Ultimate goal is lose enough weight and eat healthy enough and exercise daily to work his way off all his current RX medications.! < 250lbs. is weight goal.     Tobacco: No tobacco use     Medication Adherence:  Issues - stopped actos 90 day     Diabetes:  Pt currently taking ER metformin 500mg.-2 bid + Farxiga 10mg./day last  6-8 weeks now, did not renew his actos about 90 days - traveling a lot --didn't have time. (visiting new granddaughter in [place].)  Pt is not experiencing side effects: increased thirst --drinking more water due to farxiga,  SMBG: no meter yet-declines today    Ranges: n/a     Symptoms of low blood sugar? none. Frequency of hypoglycemia? never.  Recent symptoms of high blood sugar? none.  Eye exam: up to date  Foot exam: due  Microalbumin is wnl and he is on an ace inhibitor.  Aspirin: 81mg asa daily now  He declines flu this year.  Diet/Exercise: walking daily, sometimes weights at [employer]--Breakfast Eggs, lunch: salads with chicken, or sandwiches, Dinner: tacos, chicken, green beans, No bedtime snack.   Eliminated sugar drinks, diet is same. Drinking more water!    Bread is his weak link. Doing better . Only toast in am no late breads.    Family membership at [employer]--daniela is going with him,    They go 1-2 x week - 1.5-2 hrs. - walking treadmill , mild strength training --upper body stuff.     He has lost 20lbs. In last 2 years --feels great .       Hypertension: Current medications include lotrel 10-20qam.  Patient does not self-monitor BP.  Patient reports no current medication side  effects.  fyi--he admits loves salt and likes to add it to his foods.       Hyperlipidemia: Current therapy includes diet and weight loss.  We discussed DM statin lipid guidelines --pt. Does not want to start any lipid meds at this time. Recheck fasting lipids in 6 months.     Vitamin D3: level was 26 on 3-27-18. he takes 1,000 iu's /day .  Vitamin D3 helps support bone health, strengthen the immune system, and may improve mood and lessen muscle aches/pains if deficient.  Vitamin B12: level was 414 on 3-27-18, he takes 500mcg./day otc pill. Vitamin B12 helps support memory and lessens fatigue.          Current labs include:  Today's Vitals: /82  Pulse 68  Wt 243 lb 1.6 oz (110.3 kg)  SpO2 98%  BMI 36.16 kg/m2  BP Readings from Last 3 Encounters:   05/03/18 138/82   07/24/17 136/79   03/30/17 118/68     Lab Results   Component Value Date    A1C 8.2 05/03/2018   .  Lab Results   Component Value Date    CHOL 218 03/27/2018     Lab Results   Component Value Date    TRIG 157 03/27/2018     Lab Results   Component Value Date    HDL 41 03/27/2018     Lab Results   Component Value Date     03/27/2018       Liver Function Studies -   Recent Labs   Lab Test  03/27/18   0801   PROTTOTAL  7.4   ALBUMIN  3.8   BILITOTAL  0.4   ALKPHOS  75   AST  17   ALT  24       Lab Results   Component Value Date    UCRR 167 03/27/2018    MICROL 33 03/27/2018    UMALCR 20.00 (H) 03/27/2018       Last Basic Metabolic Panel:  Lab Results   Component Value Date     03/27/2018      Lab Results   Component Value Date    POTASSIUM 4.1 03/27/2018     Lab Results   Component Value Date    CHLORIDE 104 03/27/2018     Lab Results   Component Value Date    BUN 12 03/27/2018     Lab Results   Component Value Date    CR 0.87 03/27/2018     GFR Estimate   Date Value Ref Range Status   03/27/2018 89 >60 mL/min/1.7m2 Final     Comment:     Non  GFR Calc   07/24/2017 89 >60 mL/min/1.7m2 Final     Comment:     Non   American GFR Calc   03/30/2017 87 >60 mL/min/1.7m2 Final     Comment:     Non  GFR Calc     GFR Estimate If Black   Date Value Ref Range Status   03/27/2018 >90 >60 mL/min/1.7m2 Final     Comment:      GFR Calc   07/24/2017 >90   GFR Calc   >60 mL/min/1.7m2 Final   03/30/2017 >90   GFR Calc   >60 mL/min/1.7m2 Final       Most Recent Immunizations   Administered Date(s) Administered     Pneumococcal 23 valent 07/24/2017     TD (ADULT, 7+) 04/14/2000     TDAP Vaccine (Boostrix) 04/23/2014         ASSESSMENT:                                                    Current medications were reviewed with him today.      Medication Adherence: Issues identified       Diabetes: Needs Improvement. Patient is not meeting A1c goal of < 7%. Self monitoring of blood glucose -pt. Declines to test. . Pt would benefit from Metformin :  stay on the same dose.  Actos:  Start at dose : 30mg ./day now .   SGLT2 inhibitor (Jardiance) :  stay on the same dose.  Increased exercise--walk the dogs more.   Updated Hemoglobin A1c-8.2% --better but not at goal.   Increase in home glucose monitoring-he declines to test.   Weight loss recommended--re-commit to low carb diet.         Hypertension: Stable. Patient is meeting BP goal of < 140/90mmHg.   Pt would benefit from the following changes - continued BP monitoring, watching diet, increasing exercise and weight loss and sodium restriction-see plan.         Hyperlipidemia: Pt. Declines any statin drug at this time , wants to recheck fasting lipids sept-2018.      Vitamin D3: is not at goal of 50-75ng/mL. Pt would benefit from vitamin D3 lab recheck sept -2018.   Vitamin B12: is not at goal of 600-1000pg/mL. Pt would benefit from vitamin B12 lab recheck -sept -2018.       PLAN:                                                            1.  A1c today is 8.2% --much improved --headed in right direction.   Please stay on metformin and  farxiga and start pioglitazone 30mg./day(new dose) .     2. Your cholesterol was too high in march -- lets recheck in the fall-2018.     3. FYI--Blood pressure today is 138/82mmhg --please see if you can wean off additive table salt and try using Mrs. Hilliard salt substitute or other spices to flavor foods.       Next MTM visit:  See me Thursday September 20th at 11am .   I spent 40 minutes with this patient today .       To schedule another MTM appointment, please call the clinic directly or you may call the MTM scheduling line at [phone] or toll-free at [phone].     My Clinical Pharmacist's contact information:                                                      It was a pleasure seeing you today!  Please feel free to contact me with any questions or concerns you have.      Mally Oakley [facility].  Medication Therapy Management Provider  [phone]    You may receive a survey about the MTM services you received.  I would appreciate your feedback to help me serve you better in the future. Please fill it out and return it when you can. Your comments will be anonymous.

## 2018-05-03 NOTE — MR AVS SNAPSHOT
After Visit Summary   5/3/2018    Bigg Lilly    MRN: [id number]           Patient Information     Date Of Birth          1955        Visit Information        Provider Department      5/3/2018 3:00 PM Mally Oakley RPH [facility]        Today's Diagnoses     Uncontrolled type 2 diabetes mellitus with stage 2 chronic kidney disease, without long-term current use of insulin (H)    -  1    Hypertension goal BP (blood pressure) < 140/90          Care Instructions    Recommendations from today's [facility] visit:                                                        1.  A1c today is 8.2% --much improved --headed in right direction.   Please stay on metformin and farxiga and start pioglitazone 30mg./day(new dose) .     2. Your cholesterol was too high in march -- lets recheck in the fall-2018.     3. FYI--Blood pressure today is 138/82mmhg --please see if you can wean off additive table salt and try using Mrs. Hilliard salt substitute or other spices to flavor foods.       Next MT visit:  See me Thursday September 20th at 11am .     To schedule another [facility] appointment, please call the clinic directly or you may call the MT scheduling line at [phone] or toll-free at [phone].     My Clinical Pharmacist's contact information:                                                      It was a pleasure seeing you today!  Please feel free to contact me with any questions or concerns you have.      Mally Oakley Rph.  Medication Therapy Management Provider  [phone]    You may receive a survey about the [facility] services you received.  I would appreciate your feedback to help me serve you better in the future. Please fill it out and return it when you can. Your comments will be anonymous.                  Follow-ups after your visit        Your next 10 appointments already scheduled     Sep 20, 2018 11:00 AM CDT   SHORT with Mally Oakley RPH   [facility]  ([facility])    [address]  [facility] [id number] [phone]              Who to contact     If you have questions or need follow up information about today's clinic visit or your schedule please contact [facility] MTM directly at [phone].  Normal or non-critical lab and imaging results will be communicated to you by MyChart, letter or phone within 4 business days after the clinic has received the results. If you do not hear from us within 7 days, please contact the clinic through MyChart or phone. If you have a critical or abnormal lab result, we will notify you by phone as soon as possible.  Submit refill requests through Dajiabao or call your pharmacy and they will forward the refill request to us. Please allow 3 business days for your refill to be completed.          Additional Information About Your Visit        MyChart Information     [employer] gives you secure access to your electronic health record. If you see a primary care provider, you can also send messages to your care team and make appointments. If you have questions, please call your primary care clinic.  If you do not have a primary care provider, please call [phone] and they will assist you.        Care EveryWhere ID     This is your Care EveryWhere ID. This could be used by other organizations to access your [place] medical records  [id number]        Your Vitals Were     Pulse Pulse Oximetry BMI (Body Mass Index)             68 98% 36.16 kg/m2          Blood Pressure from Last 3 Encounters:   05/03/18 138/82   07/24/17 136/79   03/30/17 118/68    Weight from Last 3 Encounters:   05/03/18 243 lb 1.6 oz (110.3 kg)   07/24/17 249 lb (112.9 kg)   03/30/17 253 lb 6.4 oz (114.9 kg)                 Today's Medication Changes          These changes are accurate as of 5/3/18  3:24 PM.  If you have any questions, ask your nurse or doctor.               These medicines have changed or have updated  prescriptions.        Dose/Directions    amLODIPine-benazepril 10-20 MG per capsule   Commonly known as:  LOTREL   This may have changed:  See the new instructions.   Used for:  Hypertension goal BP (blood pressure) < 140/90   Changed by:  Mally Oakley RPH        TAKE 1 CAPSULE BY MOUTH DAILY   Quantity:  90 capsule   Refills:  1       pioglitazone 30 MG tablet   Commonly known as:  ACTOS   This may have changed:    - medication strength  - how much to take   Used for:  Uncontrolled type 2 diabetes mellitus with stage 2 chronic kidney disease, without long-term current use of insulin (H)   Changed by:  Mally Oakley RPH        Dose:  30 mg   Take 1 tablet (30 mg) by mouth daily   Quantity:  90 tablet   Refills:  1            Where to get your medicines      These medications were sent to [id number] - [place], [place] - [address] [place] [address] [address]  [address], [facility] [address]     Phone:  [phone]     amLODIPine-benazepril 10-20 MG per capsule    pioglitazone 30 MG tablet                Primary Care Provider Office Phone # Fax #    Ramon Owen [phone] [address] [facility] [address]        Equal Access to Services     MEL BURKS AH: Hadii cynthia ku hadasho Soomaali, waaxda luqadaha, qaybta kaalmada adeegyada, marina gómezn prachi echeverria. So [facility] [phone].    ATENCIÓN: Si habla español, tiene a mcknight disposición servicios gratuitos de asistencia lingüística. [facility] [phone].    We comply with applicable federal civil rights laws and [place] laws. We do not discriminate on the basis of race, color, national origin, age, disability, sex, sexual orientation, or gender identity.            Thank you!     Thank you for choosing [facility]  for your care. Our goal is always to provide you with excellent care. Hearing back from our patients is one way we can continue to improve our services. Please take a few minutes to  complete the written survey that you may receive in the mail after your visit with us. Thank you!             Your Updated Medication List - Protect others around you: Learn how to safely use, store and throw away your medicines at www.disposemymeds.org.          This list is accurate as of 5/3/18  3:24 PM.  Always use your most recent med list.                   Brand Name Dispense Instructions for use Diagnosis    amLODIPine-benazepril 10-20 MG per capsule    LOTREL    90 capsule    TAKE 1 CAPSULE BY MOUTH DAILY    Hypertension goal BP (blood pressure) < 140/90       aspirin 81 MG tablet      Take 81 mg by mouth daily        cyanocolbalamin 500 MCG tablet    vitamin  B-12     Take 500 mcg by mouth daily        dapagliflozin 10 MG Tabs tablet    FARXIGA    90 tablet    Take 1 tablet (10 mg) by mouth daily    Type 2 diabetes mellitus without complication, without long-term current use of insulin (H)       metFORMIN 500 MG 24 hr tablet    GLUCOPHAGE-XR    360 tablet    Take 2 tablets (1,000 mg) by mouth 2 times daily (with meals)    Uncontrolled type 2 diabetes mellitus without complication, without long-term current use of insulin (H)       pioglitazone 30 MG tablet    ACTOS    90 tablet    Take 1 tablet (30 mg) by mouth daily    Uncontrolled type 2 diabetes mellitus with stage 2 chronic kidney disease, without long-term current use of insulin (H)       STATIN NOT PRESCRIBED (INTENTIONAL)      Statin not prescribed intentionally due to Other (patient choice.)    Hyperlipidemia with target LDL less than 100       Vitamin D3 2000 units Tabs      Take 1,000 Units by mouth daily

## 2018-05-03 NOTE — PATIENT INSTRUCTIONS
Recommendations from today's MTM visit:                                                        1.  A1c today is 8.2% --much improved --headed in right direction.   Please stay on metformin and farxiga and start pioglitazone 30mg./day(new dose) .     2. Your cholesterol was too high in march -- lets recheck in the fall-2018.     3. FYI--Blood pressure today is 138/82mmhg --please see if you can wean off additive table salt and try using Mrs. Hilliard salt substitute or other spices to flavor foods.       Next MTM visit:  See me Thursday September 20th at 11am .     To schedule another MTM appointment, please call the clinic directly or you may call the MTM scheduling line at [phone] or toll-free at [phone].     My Clinical Pharmacist's contact information:                                                      It was a pleasure seeing you today!  Please feel free to contact me with any questions or concerns you have.      Mally Oakley [facility].  Medication Therapy Management Provider  [phone]    You may receive a survey about the MTM services you received.  I would appreciate your feedback to help me serve you better in the future. Please fill it out and return it when you can. Your comments will be anonymous.

## 2018-06-18 DIAGNOSIS — E11.9 TYPE 2 DIABETES MELLITUS WITHOUT COMPLICATION, WITHOUT LONG-TERM CURRENT USE OF INSULIN (H): ICD-10-CM

## 2018-06-18 NOTE — TELEPHONE ENCOUNTER
"Requested Prescriptions   Pending Prescriptions Disp Refills     FARXIGA 10 MG TABS tablet [Pharmacy Med Name: Farxiga Oral Tablet 10 MG]  Last Written Prescription Date:  6/6/2017  Last Fill Quantity: 90 tablet,  # refills: 1   Last office visit: 5/3/2018 with prescribing provider: Pharm D- Rukavina   Future Office Visit:     60 tablet 0     Sig: TAKE ONE TABLET BY MOUTH ONE TIME DAILY    Sodium Glucose Co-Transport Inhibitor Agents Passed    6/18/2018  7:00 AM       Passed - Blood pressure less than 140/90 in past 6 months    BP Readings from Last 3 Encounters:   05/03/18 138/82   07/24/17 136/79   03/30/17 118/68                Passed - Patient has documented LDL within the past 12 mos.    Recent Labs   Lab Test  03/27/18   0801   LDL  146*            Passed - Patient has had a Microalbumin in the past 12 mos.    Recent Labs   Lab Test  03/27/18   0802   MICROL  33   UMALCR  20.00*            Passed - Patient has documented A1c within the specified period of time.    If HgbA1C is 8 or greater, it needs to be on file within the past 3 months.  If less than 8, must be on file within the past 6 months.     Recent Labs   Lab Test  05/03/18   1459   A1C  8.2*            Passed - No creatinine >1.4 or GFR <45 within the past 12 mos    Recent Labs   Lab Test  03/27/18   0801   GFRESTIMATED  89   GFRESTBLACK  >90       Recent Labs   Lab Test  03/27/18   0801   CR  0.87            Passed - Patient is age 18 or older       Passed - Patient has documented normal Potassium within the last 12 mos.    Recent Labs   Lab Test  03/27/18   0801   POTASSIUM  4.1            Passed - Recent (6 mo) or future (30 days) visit within the authorizing provider's specialty    Patient had office visit in the last 6 months or has a visit in the next 30 days with authorizing provider or within the authorizing provider's specialty.  See \"Patient Info\" tab in inbasket, or \"Choose Columns\" in Meds & Orders section of the refill encounter.        "

## 2018-06-20 RX ORDER — DAPAGLIFLOZIN 10 MG/1
TABLET, FILM COATED ORAL
Qty: 30 TABLET | Refills: 1 | Status: SHIPPED | OUTPATIENT
Start: 2018-06-20 | End: 2018-07-09

## 2018-06-20 NOTE — TELEPHONE ENCOUNTER
Medication approved per standing orders.     Annmarie Paniagua, RN      5/3/18  PLAN:        1.  A1c today is 8.2% --much improved --headed in right direction.   Please stay on metformin and farxiga and start pioglitazone 30mg./day(new dose) .      2. Your cholesterol was too high in march -- lets recheck in the fall-2018.      3. FYI--Blood pressure today is 138/82mmhg --please see if you can wean off additive table salt and try using  Dash salt substitute or other spices to flavor foods.         Next MTM visit:  See me Thursday September 20th at 11am .   I spent 40 minutes with this patient today .

## 2018-07-09 ENCOUNTER — TELEPHONE (OUTPATIENT)
Dept: FAMILY MEDICINE | Facility: CLINIC | Age: 63
End: 2018-07-09

## 2018-07-09 DIAGNOSIS — E11.9 TYPE 2 DIABETES MELLITUS WITHOUT COMPLICATION, WITHOUT LONG-TERM CURRENT USE OF INSULIN (H): ICD-10-CM

## 2018-07-09 RX ORDER — DAPAGLIFLOZIN 10 MG/1
10 TABLET, FILM COATED ORAL DAILY
Qty: 90 TABLET | Refills: 3 | Status: SHIPPED | OUTPATIENT
Start: 2018-07-09 | End: 2018-07-16

## 2018-07-09 NOTE — TELEPHONE ENCOUNTER
Fax from [employer] that patient has coupon card that has been helping him pay for Farxiga but if send PA and gets approved may be able to get RX for free.  Please advise.  Annmarie Paniagua RN    Phone- [phone]  ID- [id number]  LSZ- [id number]  I- [id number]

## 2018-07-10 NOTE — TELEPHONE ENCOUNTER
Central Prior Authorization Team   Phone: [phone]    PA Initiation  PA Initiation    Medication: Farxiga  Insurance Company: [employer] - Phone [phone] Fax [phone]  Pharmacy Filling the Rx: [facility][id number] [address]  Filling Pharmacy Phone: [phone]  Filling Pharmacy Fax:    Start Date: 7/10/2018

## 2018-07-12 NOTE — TELEPHONE ENCOUNTER
PRIOR AUTHORIZATION DENIED    Medication: Farxiga    Denial Date: 7/12/2018    Denial Rational: Patient must try and fail Invokana before this will be covered          Appeal Information: If provider would like to appeal please provide a letter of medical necessity stating why formulary alternatives would not be clinically appropriate for patient and route back to the PA team.

## 2018-07-31 ENCOUNTER — MYC MEDICAL ADVICE (OUTPATIENT)
Dept: PHARMACY | Facility: CLINIC | Age: 63
End: 2018-07-31

## 2018-08-06 RX ORDER — DAPAGLIFLOZIN 10 MG/1
10 TABLET, FILM COATED ORAL DAILY
Qty: 90 TABLET | Refills: 1 | Status: SHIPPED | OUTPATIENT
Start: 2018-08-06 | End: 2018-12-14

## 2018-08-06 NOTE — TELEPHONE ENCOUNTER
8-6-18    Need to go back to Farxiga vs the newly prescribed replacement medication  New meds will cost approx $450 per month vs the $150 currently paying with Farxiga  Met with pharmacist  and reviewed cost and insurance coverage and determined FARXIGA fits budget better.  Would prefer Jardiance if i could get monthly cost to be lower.    Thanks  Josr jacobson--mtm will re-order farxiga 10mg tabs to his phcy now and stop his invokana.    Mally Oakley, [facility].  Medication Therapy Management Provider  [phone]

## 2018-08-18 ENCOUNTER — MYC REFILL (OUTPATIENT)
Dept: FAMILY MEDICINE | Facility: CLINIC | Age: 63
End: 2018-08-18

## 2018-08-18 DIAGNOSIS — I10 HYPERTENSION GOAL BP (BLOOD PRESSURE) < 140/90: ICD-10-CM

## 2018-08-18 RX ORDER — AMLODIPINE AND BENAZEPRIL HYDROCHLORIDE 10; 20 MG/1; MG/1
CAPSULE ORAL
Qty: 90 CAPSULE | Refills: 1 | Status: CANCELLED | OUTPATIENT
Start: 2018-08-18

## 2018-08-18 NOTE — TELEPHONE ENCOUNTER
Message from iNeedhart:  Original authorizing provider: MD Bigg Daniel would like a refill of the following medications:  amLODIPine-benazepril (LOTREL) 10-20 MG per capsule [Ramon Owen MD]    Preferred pharmacy: [facility] #[id number] [facility] [address] [place] [address] [address]    Comment:

## 2018-08-18 NOTE — TELEPHONE ENCOUNTER
"Last Written Prescription Date:  5/03/18  Last Fill Quantity: 90 capsule,  # refills: 1   Last office visit: 7/24/2017 with prescribing provider:  Lexie   Future Office Visit:   Next 5 appointments (look out 90 days)     Sep 20, 2018 11:00 AM CDT   SHORT with Mally Oakley RPH   [facility] ([facility])    [address] [facility] [address] [phone]                 Requested Prescriptions   Pending Prescriptions Disp Refills     amLODIPine-benazepril (LOTREL) 10-20 MG per capsule 90 capsule 1     Sig: TAKE 1 CAPSULE BY MOUTH DAILY    Calcium Channel Blockers Protocol  Failed    8/18/2018 10:21 AM       Failed - Recent (12 mo) or future (30 days) visit within the authorizing provider's specialty    Patient had office visit in the last 12 months or has a visit in the next 30 days with authorizing provider or within the authorizing provider's specialty.  See \"Patient Info\" tab in inbasket, or \"Choose Columns\" in Meds & Orders section of the refill encounter.           Passed - Blood pressure under 140/90 in past 12 months    BP Readings from Last 3 Encounters:   05/03/18 138/82   07/24/17 136/79   03/30/17 118/68                Passed - Patient is age 18 or older       Passed - Normal serum creatinine on file in past 12 months    Recent Labs   Lab Test  03/27/18   0801   CR  0.87               "

## 2018-09-10 ENCOUNTER — TRANSFERRED RECORDS (OUTPATIENT)
Dept: HEALTH INFORMATION MANAGEMENT | Facility: CLINIC | Age: 63
End: 2018-09-10

## 2018-09-10 ENCOUNTER — MYC REFILL (OUTPATIENT)
Dept: FAMILY MEDICINE | Facility: CLINIC | Age: 63
End: 2018-09-10

## 2018-09-10 DIAGNOSIS — I10 HYPERTENSION GOAL BP (BLOOD PRESSURE) < 140/90: ICD-10-CM

## 2018-09-10 RX ORDER — AMLODIPINE AND BENAZEPRIL HYDROCHLORIDE 10; 20 MG/1; MG/1
CAPSULE ORAL
Qty: 90 CAPSULE | Refills: 1 | Status: CANCELLED | OUTPATIENT
Start: 2018-09-10

## 2018-09-10 NOTE — TELEPHONE ENCOUNTER
Message from Delivery Herohart:  Original authorizing provider: MD Bigg Daniel would like a refill of the following medications:  amLODIPine-benazepril (LOTREL) 10-20 MG per capsule [Ramon Owen MD]    Preferred pharmacy: [facility] #[id number] [facility] [address] [place] [address] [address]    Comment:

## 2018-09-10 NOTE — TELEPHONE ENCOUNTER
Message from [employer]:  Original authorizing provider: MD Bigg Daniel would like a refill of the following medications:  amLODIPine-benazepril (LOTREL) 10-20 MG per capsule [Ramon Owen MD]    Preferred pharmacy: [facility] #[id number] [facility] [address] [place] [address] [address]    Comment:

## 2018-09-25 DIAGNOSIS — N18.2 CKD (CHRONIC KIDNEY DISEASE) STAGE 2, GFR 60-89 ML/MIN: ICD-10-CM

## 2018-09-25 DIAGNOSIS — E78.5 HYPERLIPIDEMIA WITH TARGET LDL LESS THAN 100: ICD-10-CM

## 2018-09-25 LAB — HBA1C MFR BLD: 7.5 % (ref 0–5.6)

## 2018-09-25 PROCEDURE — 80048 BASIC METABOLIC PNL TOTAL CA: CPT | Performed by: FAMILY MEDICINE

## 2018-09-25 PROCEDURE — 80061 LIPID PANEL: CPT | Performed by: FAMILY MEDICINE

## 2018-09-25 PROCEDURE — 82043 UR ALBUMIN QUANTITATIVE: CPT | Performed by: FAMILY MEDICINE

## 2018-09-25 PROCEDURE — 83036 HEMOGLOBIN GLYCOSYLATED A1C: CPT | Performed by: FAMILY MEDICINE

## 2018-09-25 PROCEDURE — 36415 COLL VENOUS BLD VENIPUNCTURE: CPT | Performed by: FAMILY MEDICINE

## 2018-09-26 LAB
ANION GAP SERPL CALCULATED.3IONS-SCNC: 11 MMOL/L (ref 3–14)
BUN SERPL-MCNC: 15 MG/DL (ref 7–30)
CALCIUM SERPL-MCNC: 8.9 MG/DL (ref 8.5–10.1)
CHLORIDE SERPL-SCNC: 108 MMOL/L (ref 94–109)
CHOLEST SERPL-MCNC: 188 MG/DL
CO2 SERPL-SCNC: 21 MMOL/L (ref 20–32)
CREAT SERPL-MCNC: 0.98 MG/DL (ref 0.66–1.25)
CREAT UR-MCNC: 118 MG/DL
GFR SERPL CREATININE-BSD FRML MDRD: 77 ML/MIN/1.7M2
GLUCOSE SERPL-MCNC: 124 MG/DL (ref 70–99)
HDLC SERPL-MCNC: 44 MG/DL
LDLC SERPL CALC-MCNC: 126 MG/DL
MICROALBUMIN UR-MCNC: 13 MG/L
MICROALBUMIN/CREAT UR: 10.85 MG/G CR (ref 0–17)
NONHDLC SERPL-MCNC: 144 MG/DL
POTASSIUM SERPL-SCNC: 4.3 MMOL/L (ref 3.4–5.3)
SODIUM SERPL-SCNC: 140 MMOL/L (ref 133–144)
TRIGL SERPL-MCNC: 92 MG/DL

## 2018-09-27 ENCOUNTER — OFFICE VISIT (OUTPATIENT)
Dept: FAMILY MEDICINE | Facility: CLINIC | Age: 63
End: 2018-09-27
Payer: COMMERCIAL

## 2018-09-27 VITALS
SYSTOLIC BLOOD PRESSURE: 132 MMHG | HEIGHT: 69 IN | DIASTOLIC BLOOD PRESSURE: 78 MMHG | RESPIRATION RATE: 14 BRPM | TEMPERATURE: 97.4 F | WEIGHT: 241 LBS | BODY MASS INDEX: 35.7 KG/M2 | HEART RATE: 68 BPM

## 2018-09-27 DIAGNOSIS — Z23 NEED FOR PROPHYLACTIC VACCINATION AND INOCULATION AGAINST INFLUENZA: ICD-10-CM

## 2018-09-27 DIAGNOSIS — I10 HYPERTENSION GOAL BP (BLOOD PRESSURE) < 140/90: ICD-10-CM

## 2018-09-27 DIAGNOSIS — N18.2 CKD (CHRONIC KIDNEY DISEASE) STAGE 2, GFR 60-89 ML/MIN: ICD-10-CM

## 2018-09-27 DIAGNOSIS — E66.01 MORBID OBESITY (H): ICD-10-CM

## 2018-09-27 DIAGNOSIS — Z12.5 SPECIAL SCREENING FOR MALIGNANT NEOPLASM OF PROSTATE: ICD-10-CM

## 2018-09-27 DIAGNOSIS — E78.5 HYPERLIPIDEMIA WITH TARGET LDL LESS THAN 100: ICD-10-CM

## 2018-09-27 DIAGNOSIS — Z00.00 ROUTINE GENERAL MEDICAL EXAMINATION AT A HEALTH CARE FACILITY: Primary | ICD-10-CM

## 2018-09-27 DIAGNOSIS — Z11.4 SCREENING FOR HIV (HUMAN IMMUNODEFICIENCY VIRUS): ICD-10-CM

## 2018-09-27 LAB
ALBUMIN SERPL-MCNC: 4.2 G/DL (ref 3.4–5)
ALP SERPL-CCNC: 65 U/L (ref 40–150)
ALT SERPL W P-5'-P-CCNC: 24 U/L (ref 0–70)
ANION GAP SERPL CALCULATED.3IONS-SCNC: 6 MMOL/L (ref 3–14)
AST SERPL W P-5'-P-CCNC: 17 U/L (ref 0–45)
BILIRUB SERPL-MCNC: 0.4 MG/DL (ref 0.2–1.3)
BUN SERPL-MCNC: 11 MG/DL (ref 7–30)
CALCIUM SERPL-MCNC: 9.4 MG/DL (ref 8.5–10.1)
CHLORIDE SERPL-SCNC: 104 MMOL/L (ref 94–109)
CO2 SERPL-SCNC: 29 MMOL/L (ref 20–32)
CREAT SERPL-MCNC: 0.95 MG/DL (ref 0.66–1.25)
GFR SERPL CREATININE-BSD FRML MDRD: 80 ML/MIN/1.7M2
GLUCOSE SERPL-MCNC: 201 MG/DL (ref 70–99)
POTASSIUM SERPL-SCNC: 4.2 MMOL/L (ref 3.4–5.3)
PROT SERPL-MCNC: 7.9 G/DL (ref 6.8–8.8)
PSA SERPL-ACNC: 0.63 UG/L (ref 0–4)
SODIUM SERPL-SCNC: 139 MMOL/L (ref 133–144)

## 2018-09-27 PROCEDURE — 90471 IMMUNIZATION ADMIN: CPT | Performed by: FAMILY MEDICINE

## 2018-09-27 PROCEDURE — 80053 COMPREHEN METABOLIC PANEL: CPT | Performed by: FAMILY MEDICINE

## 2018-09-27 PROCEDURE — 90682 RIV4 VACC RECOMBINANT DNA IM: CPT | Performed by: FAMILY MEDICINE

## 2018-09-27 PROCEDURE — G0103 PSA SCREENING: HCPCS | Performed by: FAMILY MEDICINE

## 2018-09-27 PROCEDURE — 99207 C FOOT EXAM  NO CHARGE: CPT | Mod: 25 | Performed by: FAMILY MEDICINE

## 2018-09-27 PROCEDURE — 36415 COLL VENOUS BLD VENIPUNCTURE: CPT | Performed by: FAMILY MEDICINE

## 2018-09-27 PROCEDURE — 87389 HIV-1 AG W/HIV-1&-2 AB AG IA: CPT | Performed by: FAMILY MEDICINE

## 2018-09-27 PROCEDURE — 99396 PREV VISIT EST AGE 40-64: CPT | Mod: 25 | Performed by: FAMILY MEDICINE

## 2018-09-27 RX ORDER — METFORMIN HCL 500 MG
1000 TABLET, EXTENDED RELEASE 24 HR ORAL 2 TIMES DAILY WITH MEALS
Qty: 360 TABLET | Refills: 1
Start: 2018-09-27 | End: 2019-11-05

## 2018-09-27 RX ORDER — METFORMIN HCL 500 MG
1000 TABLET, EXTENDED RELEASE 24 HR ORAL 2 TIMES DAILY WITH MEALS
Qty: 180 TABLET | Refills: 1 | Status: SHIPPED | OUTPATIENT
Start: 2018-09-27 | End: 2018-09-27

## 2018-09-27 RX ORDER — AMLODIPINE AND BENAZEPRIL HYDROCHLORIDE 10; 20 MG/1; MG/1
CAPSULE ORAL
Qty: 90 CAPSULE | Refills: 1 | Status: SHIPPED | OUTPATIENT
Start: 2018-09-27 | End: 2019-11-05

## 2018-09-27 RX ORDER — PIOGLITAZONEHYDROCHLORIDE 30 MG/1
30 TABLET ORAL DAILY
Qty: 90 TABLET | Refills: 1 | Status: SHIPPED | OUTPATIENT
Start: 2018-09-27 | End: 2019-04-04

## 2018-09-27 NOTE — PROGRESS NOTES

## 2018-09-27 NOTE — MR AVS SNAPSHOT
After Visit Summary   9/27/2018    Bigg Lilly    MRN: [id number]           Patient Information     Date Of Birth          1955        Visit Information        Provider Department      9/27/2018 1:00 PM Ramon Owen MD [facility]        Today's Diagnoses     Routine general medical examination at a health care facility    -  1    Type 2 diabetes mellitus with stage 2 chronic kidney disease, without long-term current use of insulin (H)        Morbid obesity (H)        Hypertension goal BP (blood pressure) < 140/90        Hyperlipidemia with target LDL less than 100        CKD (chronic kidney disease) stage 2, GFR 60-89 ml/min        Screening for HIV (human immunodeficiency virus)        Special screening for malignant neoplasm of prostate        Uncontrolled type 2 diabetes mellitus without complication, without long-term current use of insulin (H)        Uncontrolled type 2 diabetes mellitus with stage 2 chronic kidney disease, without long-term current use of insulin (H)          Care Instructions      Preventive Health Recommendations  Male Ages 50 - 64    Yearly exam:             See your health care provider every year in order to  o   Review health changes.   o   Discuss preventive care.    o   Review your medicines if your doctor has prescribed any.     Have a cholesterol test every 5 years, or more frequently if you are at risk for high cholesterol/heart disease.     Have a diabetes test (fasting glucose) every three years. If you are at risk for diabetes, you should have this test more often.     Have a colonoscopy at age 50, or have a yearly FIT test (stool test). These exams will check for colon cancer.      Talk with your health care provider about whether or not a prostate cancer screening test (PSA) is right for you.    You should be tested each year for STDs (sexually transmitted diseases), if you re at risk.     Shots: Get a flu shot each year. Get a  tetanus shot every 10 years.     Nutrition:    Eat at least 5 servings of fruits and vegetables daily.     Eat whole-grain bread, whole-wheat pasta and brown rice instead of white grains and rice.     Get adequate Calcium and Vitamin D.     Lifestyle    Exercise for at least 150 minutes a week (30 minutes a day, 5 days a week). This will help you control your weight and prevent disease.     Limit alcohol to one drink per day.     No smoking.     Wear sunscreen to prevent skin cancer.     See your dentist every six months for an exam and cleaning.     See your eye doctor every 1 to 2 years.  New Shingles Vaccination @ pharmacy            Follow-ups after your visit        Follow-up notes from your care team     Return in about 6 months (around 3/27/2019).      Who to contact     If you have questions or need follow up information about today's clinic visit or your schedule please contact [facility] directly at [phone].  Normal or non-critical lab and imaging results will be communicated to you by MyChart, letter or phone within 4 business days after the clinic has received the results. If you do not hear from us within 7 days, please contact the clinic through UMMChart or phone. If you have a critical or abnormal lab result, we will notify you by phone as soon as possible.  Submit refill requests through [employer] or call your pharmacy and they will forward the refill request to us. Please allow 3 business days for your refill to be completed.          Additional Information About Your Visit        UMMCharPiAuto Information     [employer] gives you secure access to your electronic health record. If you see a primary care provider, you can also send messages to your care team and make appointments. If you have questions, please call your primary care clinic.  If you do not have a primary care provider, please call [phone] and they will assist you.        Care EveryWhere ID     This is your Care  "EveryWhere ID. This could be used by other organizations to access your [place] medical records  [id number]        Your Vitals Were     Pulse Temperature Respirations Height BMI (Body Mass Index)       68 97.4  F (36.3  C) (Oral) 14 5' 8.75\" (1.746 m) 35.85 kg/m2        Blood Pressure from Last 3 Encounters:   09/27/18 132/78   05/03/18 138/82   07/24/17 136/79    Weight from Last 3 Encounters:   09/27/18 241 lb (109.3 kg)   05/03/18 243 lb 1.6 oz (110.3 kg)   07/24/17 249 lb (112.9 kg)              We Performed the Following     Comprehensive metabolic panel     HIV Antigen Antibody Combo     PSA, screen          Where to get your medicines      These medications were sent to [facility][id number] - [place], [place] - [address]  [address], [facility] [address]     Phone:  [phone]     amLODIPine-benazepril 10-20 MG per capsule    metFORMIN 500 MG 24 hr tablet    pioglitazone 30 MG tablet          Primary Care Provider Office Phone # Fax #    Ramon Owen [phone] [address] [facility] [address]        Equal Access to Services     LAURY BURKS : Hadii cynthia ku hadasho Soomaali, waaxda luqadaha, qaybta kaalmada adeegyada, waxay samanthain selena echeverria. So [facility] [phone].    ATENCIÓN: Si habla español, tiene a mcknight disposición servicios gratuitos de asistencia lingüística. cata al [phone].    We comply with applicable federal civil rights laws and [place] laws. We do not discriminate on the basis of race, color, national origin, age, disability, sex, sexual orientation, or gender identity.            Thank you!     Thank you for choosing [facility]  for your care. Our goal is always to provide you with excellent care. Hearing back from our patients is one way we can continue to improve our services. Please take a few minutes to complete the written survey that you may receive in the mail after your visit with us. Thank " you!             Your Updated Medication List - Protect others around you: Learn how to safely use, store and throw away your medicines at www.disposemymeds.org.          This list is accurate as of 9/27/18  1:39 PM.  Always use your most recent med list.                   Brand Name Dispense Instructions for use Diagnosis    amLODIPine-benazepril 10-20 MG per capsule    LOTREL    90 capsule    TAKE 1 CAPSULE BY MOUTH DAILY    Hypertension goal BP (blood pressure) < 140/90       aspirin 81 MG tablet      Take 81 mg by mouth daily        cyanocolbalamin 500 MCG tablet    vitamin  B-12     Take 500 mcg by mouth daily        dapagliflozin 10 MG Tabs tablet    FARXIGA    90 tablet    Take 1 tablet (10 mg) by mouth daily    Uncontrolled type 2 diabetes mellitus with stage 2 chronic kidney disease, without long-term current use of insulin (H)       metFORMIN 500 MG 24 hr tablet    GLUCOPHAGE-XR    180 tablet    Take 2 tablets (1,000 mg) by mouth 2 times daily (with meals)    Uncontrolled type 2 diabetes mellitus without complication, without long-term current use of insulin (H)       pioglitazone 30 MG tablet    ACTOS    90 tablet    Take 1 tablet (30 mg) by mouth daily    Uncontrolled type 2 diabetes mellitus with stage 2 chronic kidney disease, without long-term current use of insulin (H)       STATIN NOT PRESCRIBED (INTENTIONAL)      Statin not prescribed intentionally due to Other (patient choice.)    Hyperlipidemia with target LDL less than 100       Vitamin D3 2000 units Tabs      Take 1,000 Units by mouth daily

## 2018-09-27 NOTE — PATIENT INSTRUCTIONS
Preventive Health Recommendations  Male Ages 50 - 64    Yearly exam:             See your health care provider every year in order to  o   Review health changes.   o   Discuss preventive care.    o   Review your medicines if your doctor has prescribed any.     Have a cholesterol test every 5 years, or more frequently if you are at risk for high cholesterol/heart disease.     Have a diabetes test (fasting glucose) every three years. If you are at risk for diabetes, you should have this test more often.     Have a colonoscopy at age 50, or have a yearly FIT test (stool test). These exams will check for colon cancer.      Talk with your health care provider about whether or not a prostate cancer screening test (PSA) is right for you.    You should be tested each year for STDs (sexually transmitted diseases), if you re at risk.     Shots: Get a flu shot each year. Get a tetanus shot every 10 years.     Nutrition:    Eat at least 5 servings of fruits and vegetables daily.     Eat whole-grain bread, whole-wheat pasta and brown rice instead of white grains and rice.     Get adequate Calcium and Vitamin D.     Lifestyle    Exercise for at least 150 minutes a week (30 minutes a day, 5 days a week). This will help you control your weight and prevent disease.     Limit alcohol to one drink per day.     No smoking.     Wear sunscreen to prevent skin cancer.     See your dentist every six months for an exam and cleaning.     See your eye doctor every 1 to 2 years.  New Shingles Vaccination @ pharmacy

## 2018-09-27 NOTE — PROGRESS NOTES
[facility] pharmacist calls, verbal order for quantity adjustment provided for metformin  Prescription approved per [facility] Refill Protocol.  Jaz Hernandez RN, BSN

## 2018-09-27 NOTE — PROGRESS NOTES
SUBJECTIVE:   CC: Bigg Lilly is an 63 year old male who presents for preventative health visit.   Routine general medical examination at a health care facility  (primary encounter diagnosis) -Patient states that he has been eating better and exercising more with the help of his wife.     Type 2 diabetes mellitus with stage 2 chronic kidney disease, without long-term current use of insulin (H) -   Lab Results   Component Value Date    A1C 7.5 09/25/2018    A1C 8.2 05/03/2018    A1C 9.1 03/27/2018    A1C 7.6 07/24/2017    A1C 8.2 03/30/2017       Morbid obesity (H) - Patient has lost weight, which he blames on his wife's interest in his diet  Wt Readings from Last 2 Encounters:   09/27/18 109.3 kg (241 lb)   05/03/18 110.3 kg (243 lb 1.6 oz)     Hypertension goal BP (blood pressure) < 140/90  BP Readings from Last 6 Encounters:   09/27/18 132/78   05/03/18 138/82   07/24/17 136/79   03/30/17 118/68   09/23/16 126/70   08/18/16 132/76       Hyperlipidemia with target LDL less than 100 -   On statin          Physical   History of Present Illness     Diabetes:     Frequency of checking blood sugars::  Not at all    Diabetic concerns::  None    Hypoglycemia symptoms::  None    Paraesthesia present::  No    Eye Exam in the last year::  Yes    september 2018    Diabetes Management Resources    Hypertension:     Dietary sodium intake::  Low salt diet            Today's PHQ-2 Score:   PHQ-2 ( 1999 Pfizer) 9/27/2018   Q1: Little interest or pleasure in doing things 0   Q2: Feeling down, depressed or hopeless 0   PHQ-2 Score 0   Q1: Little interest or pleasure in doing things Not at all   Q2: Feeling down, depressed or hopeless Not at all   PHQ-2 Score 0       Abuse: Current or Past(Physical, Sexual or Emotional)- No  Do you feel safe in your environment - Yes    Social History   Substance Use Topics     Smoking status: Never Smoker     Smokeless tobacco: Never Used     Alcohol use 2.0 oz/week      Comment: occasional      No flowsheet data found.No flowsheet data found.    Last PSA:   PSA   Date Value Ref Range Status   2017 0.62 0 - 4 ug/L Final     Comment:     Assay Method:  Chemiluminescence using Siemens Vista analyzer       Reviewed orders with patient. Reviewed health maintenance and updated orders accordingly - Yes      Reviewed and updated as needed this visit by clinical staff  Tobacco  Allergies  Meds  Med Hx  Surg Hx  Fam Hx  Soc Hx        Reviewed and updated as needed this visit by Provider        Past Medical History:   Diagnosis Date     Chest pain, unspecified     dr roth     CKD (chronic kidney disease) stage 2, GFR 60-89 ml/min 3/28/2018     DM type 2, goal A1C 7-8 2014     DVT (deep venous thrombosis) 11/15/2010     Hyperlipidemia LDL goal < 100 2014     Hypertension goal BP (blood pressure) < 140/90 2012     Morbid obesity (H) 2014     OBESITY NOS 2007     Stasis dermatitis of both legs 2014     Tinea pedis 2014     Type 2 diabetes mellitus with stage 2 chronic kidney disease, without long-term current use of insulin (H) 2018     Type 2 diabetes mellitus without complications (H) 10/12/2015     Uncontrolled diabetes mellitus with stage 2 chronic kidney disease (H) 3/28/2018       Past Surgical History:   Procedure Laterality Date     C NONSPECIFIC PROCEDURE  1971    PILONIDAL CYST     HC COLONOSCOPY THRU STOMA, DIAGNOSTIC      neg       Family History   Problem Relation Age of Onset     Cerebrovascular Disease Mother       age 60     Hypertension Father      HEART DISEASE Father       age 63 Heartattack     HEART DISEASE Paternal Grandfather       age 55 heartattack       Social History   Substance Use Topics     Smoking status: Never Smoker     Smokeless tobacco: Never Used     Alcohol use 2.0 oz/week      Comment: occasional         Review of Systems  CONSTITUTIONAL: NEGATIVE for fever, chills, change in weight  INTEGUMENTARY/SKIN:  "NEGATIVE for worrisome rashes, moles or lesions  EYES: NEGATIVE for vision changes or irritation  ENT: NEGATIVE for ear, mouth and throat problems  RESP: NEGATIVE for significant cough or SOB  CV: NEGATIVE for chest pain, palpitations or peripheral edema  GI: NEGATIVE for nausea, abdominal pain, heartburn, or change in bowel habits   male: negative for dysuria, hematuria, decreased urinary stream, erectile dysfunction, urethral discharge  MUSCULOSKELETAL: NEGATIVE for significant arthralgias or myalgia  NEURO: NEGATIVE for weakness, dizziness or paresthesias  PSYCHIATRIC: NEGATIVE for changes in mood or affect    OBJECTIVE:   Temp 97.4  F (36.3  C) (Oral)  Resp 14  Ht 5' 8.75\" (1.746 m)  Wt 241 lb (109.3 kg)  BMI 35.85 kg/m2    Physical Exam  GENERAL: healthy, alert and no distress  EYES: Eyes grossly normal to inspection, PERRL and conjunctivae and sclerae normal  HENT: ear canals and TM's normal, nose and mouth without ulcers or lesions  NECK: no adenopathy, no asymmetry, masses, or scars and thyroid normal to palpation  RESP: lungs clear to auscultation - no rales, rhonchi or wheezes  CV: regular rate and rhythm, normal S1 S2, no S3 or S4, no murmur, click or rub, no peripheral edema and peripheral pulses strong  ABDOMEN: soft, nontender, no hepatosplenomegaly, no masses and bowel sounds normal  MS: no gross musculoskeletal defects noted, no edema  NEURO: Normal strength and tone, mentation intact and speech normal  PSYCH: mentation appears normal, affect normal/bright  Feet with intact skin warm dry  Diagnostic Test Results:  No results found for this or any previous visit (from the past 24 hour(s)).    ASSESSMENT/PLAN:   ASSESSMENT / PLAN:  (Z00.00) Routine general medical examination at a health care facility  (primary encounter diagnosis)  Comment:completed  Plan:     (E11.22,  E11.65,  N18.2) Uncontrolled type 2 diabetes mellitus with stage 2 chronic kidney disease, without long-term current use of " "insulin (H)  Comment: at last measure  Plan: amLODIPine-benazepril (LOTREL) 10-20 MG per         capsule, Comprehensive metabolic panel,         pioglitazone (ACTOS) 30 MG tablet, metFORMIN         (GLUCOPHAGE-XR) 500 MG 24 hr tablet            (E66.01) Morbid obesity (H)  Comment: pos strokes for weight loss  Plan:     (I10) Hypertension goal BP (blood pressure) < 140/90  Comment: treated  Plan: amLODIPine-benazepril (LOTREL) 10-20 MG per         capsule            (E78.5) Hyperlipidemia with target LDL less than 100  Commentuntreated  Plan: at pt desire    (N18.2) CKD (chronic kidney disease) stage 2, GFR 60-89 ml/min  Comment:on ACE  Plan: Comprehensive metabolic panel            (Z11.4) Screening for HIV (human immunodeficiency virus)  Comment: Universal HIV testing introduced, rationale reviewed, all questions answered, permission granted to test    Plan: HIV Antigen Antibody Combo            (Z12.5) Special screening for malignant neoplasm of prostate  Comment: he desires   Plan: PSA, screen        discussed    (Z23) Need for prophylactic vaccination and inoculation against influenza  Comment: Will accept    Plan: FLU VACCINE, (RIV4) RECOMBINANT HA  , IM         (FluBlok, egg free) [43660]- >18 YRS ([facility]         recommended  50-64 YRS), Vaccine         Administration, Initial [05398]                  Ramon Owen MD                  COUNSELING:   Reviewed preventive health counseling, as reflected in patient instructions       Regular exercise       Healthy diet/nutrition       Vision screening       Hearing screening       Immunizations    Vaccinated for: Influenza      New Shingles Vaccination @ pharmacy         HIV screeninx in teen years, 1x in adult years, and at intervals if high risk       Prostate cancer screening    BP Readings from Last 1 Encounters:   18 138/82     Estimated body mass index is 35.85 kg/(m^2) as calculated from the following:    Height as of this encounter: 5' 8.75\" (1.746 " m).    Weight as of this encounter: 241 lb (109.3 kg).      Weight management plan: Patient was referred to their PCP to discuss a diet and exercise plan.     reports that he has never smoked. He has never used smokeless tobacco.      Counseling Resources:  ATP IV Guidelines  Pooled Cohorts Equation Calculator  FRAX Risk Assessment  ICSI Preventive Guidelines  Dietary Guidelines for Americans, 2010  USDA's MyPlate  ASA Prophylaxis  Lung CA Screening    The information in this document, created by the medical scribe for me, accurately reflects the services I personally performed and the decisions made by me. I have reviewed and approved this document for accuracy prior to leaving the patient care area.  September 27, 2018 1:46 PM    Ramon Owen MD  [facility] for HPI/ROS submitted by the patient on 9/27/2018   PHQ-2 Score: 0

## 2018-09-28 LAB — HIV 1+2 AB+HIV1 P24 AG SERPL QL IA: NONREACTIVE

## 2018-12-14 ENCOUNTER — MYC REFILL (OUTPATIENT)
Dept: PHARMACY | Facility: CLINIC | Age: 63
End: 2018-12-14

## 2018-12-18 RX ORDER — DAPAGLIFLOZIN 10 MG/1
10 TABLET, FILM COATED ORAL DAILY
Qty: 90 TABLET | Refills: 0 | Status: SHIPPED | OUTPATIENT
Start: 2018-12-18 | End: 2019-02-25

## 2018-12-18 NOTE — TELEPHONE ENCOUNTER
Return in about 6 months (around 3/27/2019).   Prescription approved per [facility] Refill Protocol  Karli Lilly RN BS

## 2018-12-18 NOTE — TELEPHONE ENCOUNTER
"Last Written Prescription Date:  8/06/18  Last Fill Quantity: 90 tablet,  # refills: 1   Last office visit: 9/27/2018 with prescribing provider:  Lexie   Future Office Visit:      Requested Prescriptions   Pending Prescriptions Disp Refills     dapagliflozin (FARXIGA) 10 MG TABS tablet 90 tablet 1     Sig: Take 1 tablet (10 mg) by mouth daily    Sodium Glucose Co-Transport Inhibitor Agents Passed - 12/18/2018 10:31 AM       Passed - Blood pressure less than 140/90 in past 6 months    BP Readings from Last 3 Encounters:   09/27/18 132/78   05/03/18 138/82   07/24/17 136/79                Passed - Patient has documented LDL within the past 12 mos.    Recent Labs   Lab Test 09/25/18  0755   *            Passed - Patient has had a Microalbumin in the past 15 mos.    Recent Labs   Lab Test 09/25/18  0756   MICROL 13   UMALCR 10.85            Passed - Patient has documented A1c within the specified period of time.    If HgbA1C is 8 or greater, it needs to be on file within the past 3 months.  If less than 8, must be on file within the past 6 months.     Recent Labs   Lab Test 09/25/18  0755   A1C 7.5*            Passed - No creatinine >1.4 or GFR <45 within the past 12 mos    Recent Labs   Lab Test 09/27/18  1350   GFRESTIMATED 80   GFRESTBLACK >90       Recent Labs   Lab Test 09/27/18  1350   CR 0.95            Passed - Patient is age 18 or older       Passed - Patient has documented normal Potassium within the last 12 mos.    Recent Labs   Lab Test 09/27/18  1350   POTASSIUM 4.2            Passed - Recent (6 mo) or future (30 days) visit within the authorizing provider's specialty    Patient had office visit in the last 6 months or has a visit in the next 30 days with authorizing provider or within the authorizing provider's specialty.  See \"Patient Info\" tab in inbasket, or \"Choose Columns\" in Meds & Orders section of the refill encounter.              "

## 2019-02-25 ENCOUNTER — MYC REFILL (OUTPATIENT)
Dept: PHARMACY | Facility: CLINIC | Age: 64
End: 2019-02-25

## 2019-02-26 NOTE — TELEPHONE ENCOUNTER
"Last Written Prescription Date:  12/18/18  Last Fill Quantity: 90 tablet,  # refills: 0   Last office visit: 9/27/2018 with prescribing provider:  Lexie   Future Office Visit:      Notes to Pharmacy: Due for appt in March    Requested Prescriptions   Pending Prescriptions Disp Refills     dapagliflozin (FARXIGA) 10 MG TABS tablet 90 tablet 0     Sig: Take 1 tablet (10 mg) by mouth daily    Sodium Glucose Co-Transport Inhibitor Agents Passed - 2/25/2019  6:58 PM       Passed - Blood pressure less than 140/90 in past 6 months    BP Readings from Last 3 Encounters:   09/27/18 132/78   05/03/18 138/82   07/24/17 136/79                Passed - Patient has documented LDL within the past 12 mos.    Recent Labs   Lab Test 09/25/18  0755   *            Passed - Patient has had a Microalbumin in the past 15 mos.    Recent Labs   Lab Test 09/25/18  0756   MICROL 13   UMALCR 10.85            Passed - Patient has documented A1c within the specified period of time.    If HgbA1C is 8 or greater, it needs to be on file within the past 3 months.  If less than 8, must be on file within the past 6 months.     Recent Labs   Lab Test 09/25/18  0755   A1C 7.5*            Passed - No creatinine >1.4 or GFR <45 within the past 12 mos    Recent Labs   Lab Test 09/27/18  1350   GFRESTIMATED 80   GFRESTBLACK >90       Recent Labs   Lab Test 09/27/18  1350   CR 0.95            Passed - Medication is active on med list       Passed - Patient is age 18 or older       Passed - Patient has documented normal Potassium within the last 12 mos.    Recent Labs   Lab Test 09/27/18  1350   POTASSIUM 4.2            Passed - Recent (6 mo) or future (30 days) visit within the authorizing provider's specialty    Patient had office visit in the last 6 months or has a visit in the next 30 days with authorizing provider or within the authorizing provider's specialty.  See \"Patient Info\" tab in inbasket, or \"Choose Columns\" in Meds & Orders section of the " refill encounter.

## 2019-02-27 RX ORDER — DAPAGLIFLOZIN 10 MG/1
10 TABLET, FILM COATED ORAL DAILY
Qty: 90 TABLET | Refills: 0 | Status: SHIPPED | OUTPATIENT
Start: 2019-02-27 | End: 2019-11-21

## 2019-03-01 ENCOUNTER — TELEPHONE (OUTPATIENT)
Dept: FAMILY MEDICINE | Facility: CLINIC | Age: 64
End: 2019-03-01

## 2019-03-01 NOTE — TELEPHONE ENCOUNTER
Fax from [facility] that Farxiga needs PA.  PA was denied with reasoning of had to try and fail Invokana.  Invokana RX sent 7/16/18.  Called patient to clarify.  States never took Invokana and has been on Farxiga about a year.  Advised PA was denied below and if has not tried and failed Invokana PA would still be denied.  Not sure if using coupon or copay card?  Is driving currently to [place].  Advised to follow-up with pharmacy when he returns.  Cancel PA request due to below.  Fax to [employer].  Annmarie Paniagua RN      July 17, 2018   Hai Fontanez RN          3:25 PM   Note      Pt informed and agrees to take Invokana  Hai Fontanez RN         July 16, 2018   Me          2:23 PM   Note      L/M to call.  Annmarie Paniagua RN                    1:57 PM   Ramon Owen MD routed this conversation to  Triage   Ramon Owen MD          1:57 PM   Note      invokana sent  Ramon Owen                    12:29 PM   Nicolle Mckeon CMA routed this conversation to Ramon Owen MD   July 12, 2018              9:28 AM   Bethany Gaviria routed this conversation to  Prior Auth   Bethany Gaviria          9:26 AM   Note      PRIOR AUTHORIZATION DENIED     Medication: Farxiga     Denial Date: 7/12/2018     Denial Rational: Patient must try and fail Invokana before this will be covered             Appeal Information: If provider would like to appeal please provide a letter of medical necessity stating why formulary alternatives would not be clinically appropriate for patient and route back to the PA team.

## 2019-04-04 ENCOUNTER — MYC REFILL (OUTPATIENT)
Dept: FAMILY MEDICINE | Facility: CLINIC | Age: 64
End: 2019-04-04

## 2019-04-05 RX ORDER — PIOGLITAZONEHYDROCHLORIDE 30 MG/1
30 TABLET ORAL DAILY
Qty: 30 TABLET | Refills: 0 | Status: SHIPPED | OUTPATIENT
Start: 2019-04-05 | End: 2019-11-01

## 2019-04-05 NOTE — TELEPHONE ENCOUNTER
"Requested Prescriptions   Pending Prescriptions Disp Refills     pioglitazone (ACTOS) 30 MG tablet 90 tablet 1    Last Written Prescription Date:  9/27/18  Last Fill Quantity: 90,  # refills: 1   Last Office Visit: 9/27/2018 Lexie      Return in about 6 months (around 3/27/2019).     Future Office Visit:    Next 5 appointments (look out 90 days)    Apr 18, 2019  4:30 PM CDT  SHORT with Mally Oakley RPH  [facility] ([facility] [id number] [id number] [phone]          Sig: Take 1 tablet (30 mg) by mouth daily    Thiazolidinedione Agents (TZDs)  Failed - 4/5/2019  8:16 AM       Failed - Blood pressure less than 140/90 in past 6 months    BP Readings from Last 3 Encounters:   09/27/18 132/78   05/03/18 138/82   07/24/17 136/79                Failed - Patient has documented A1c within the specified period of time.    If HgbA1C is 8 or greater, it needs to be on file within the past 3 months.  If less than 8, must be on file within the past 6 months.     Recent Labs   Lab Test 09/25/18  0755   A1C 7.5*            Failed - Recent (6 mo) or future (30 days) visit within the authorizing provider's specialty    Patient had office visit in the last 6 months or has a visit in the next 30 days with authorizing provider or within the authorizing provider's specialty.  See \"Patient Info\" tab in inbasket, or \"Choose Columns\" in Meds & Orders section of the refill encounter.           Passed - Patient has documented LDL within the past 12 mos.    Recent Labs   Lab Test 09/25/18  0755   *            Passed - Patient has a normal ALT within the past 12 mos.    Recent Labs   Lab Test 09/27/18  1350   ALT 24            Passed - Patient has a normal AST within the past 12 mos.     Recent Labs   Lab Test 09/27/18  1350   AST 17            Passed - Patient has had a Microalbumin in the past 12 mos.    Recent Labs   Lab Test 09/25/18  0756   MICROL 13   UMALCR " 10.85            Passed - Diagnosis not CHF       Passed - Medication is active on med list       Passed - Patient is age 18 or older       Passed - Patient has a normal serum Creatinine in the past 12 months    Recent Labs   Lab Test 09/27/18  1350   CR 0.95

## 2019-04-05 NOTE — TELEPHONE ENCOUNTER
Failing below.  Upcoming appt with Mally 4/18/19.  One month sent.  Annmarie Paniagua RN      Thiazolidinedione Agents (TZDs) Failed4/5 9:18 AM   Blood pressure less than 140/90 in past 6 months    Patient has documented A1c within the specified period of time.    Recent (6 mo) or future (30 days) visit within the authorizing provider's specialty

## 2019-04-17 NOTE — PROGRESS NOTES
SUBJECTIVE/OBJECTIVE:                                                    Bigg Lilly is a 63 year old male coming in for a follow-up visit (5-3-18)for Medication Therapy Management.  He was referred to me from Dr. Owen.    Today is his initial MT visit for 2019.     Chief Complaint:  Here for repeat A1c lab .           Personal Healthcare Goals:  Ultimate goal is lose enough weight and eat healthy enough and exercise daily to work his way off all his current RX medications.! < 250lbs. is weight goal.     Tobacco: No tobacco use     Medication Adherence:  Issues - stopped actos 90 day     Diabetes:  Pt currently taking ER metformin 500mg.-2 bid + Farxiga 10mg./day , pioglitazone 30mg.day  Pt is not experiencing side effects: increased thirst --drinking more water due to farxiga,  SMBG: no meter yet-declines today    Ranges: n/a     Symptoms of low blood sugar? none. Frequency of hypoglycemia? never.  Recent symptoms of high blood sugar? none.  Eye exam: up to date  Foot exam: due  Microalbumin is wnl and he is on an ace inhibitor.  Aspirin: 81mg asa daily now  He declines flu this year.  Diet/Exercise: walking daily  Bread is his weak link. Doing better . Only toast in am no late breads.        He has lost 20lbs. In last 2 years --feels great . He and wife prefer walking around the neighborhood almost daily now.       Lab Results   Component Value Date    A1C 8.7 04/18/2019    A1C 7.5 09/25/2018    A1C 8.2 05/03/2018    A1C 9.1 03/27/2018    A1C 7.6 07/24/2017       Hypertension: Current medications include lotrel 10-20qam.  Patient does not self-monitor BP.  Patient reports no current medication side effects.  fyi--he admits loves salt and likes to add it to his foods.     BP Readings from Last 3 Encounters:   04/18/19 150/70   09/27/18 132/78   05/03/18 138/82         Hyperlipidemia: Current therapy includes diet and weight loss.  We discussed DM statin lipid guidelines --pt. Does not want to start any lipid  "meds at this time. Recheck fasting lipids in 6 months.     Recent Labs   Lab Test 09/25/18  0755 03/27/18  0801  11/02/15  0830 06/12/15  0947   CHOL 188 218*   < > 176 177   HDL 44 41   < > 40* 39*   * 146*   < > 113 116   TRIG 92 157*   < > 117 110   CHOLHDLRATIO  --   --   --  4.4 4.5    < > = values in this interval not displayed.         Vitamin D3: level was 26 on 3-27-18. he takes 1,000 iu's /day .  Vitamin D3 helps support bone health, strengthen the immune system, and may improve mood and lessen muscle aches/pains if deficient.  Vitamin B12: level was 414 on 3-27-18, he takes 500mcg./day otc pill. Vitamin B12 helps support memory and lessens fatigue.    Immunizations:  We discussed he should consider shingrix now at his age . He state she had shingles in the past and doesn't want to get them again --will consider it .       BP Readings from Last 1 Encounters:   04/18/19 150/70     Pulse Readings from Last 1 Encounters:   04/18/19 72     Wt Readings from Last 1 Encounters:   04/18/19 248 lb 11.2 oz (112.8 kg)     Ht Readings from Last 1 Encounters:   09/27/18 5' 8.75\" (1.746 m)     Estimated body mass index is 36.99 kg/m  as calculated from the following:    Height as of 9/27/18: 5' 8.75\" (1.746 m).    Weight as of this encounter: 248 lb 11.2 oz (112.8 kg).    Temp Readings from Last 1 Encounters:   09/27/18 97.4  F (36.3  C) (Oral)         ASSESSMENT:                                                    Current medications were reviewed with him today.      Medication Adherence: Issues identified       Diabetes: Needs Improvement. Patient is not meeting A1c goal of < 7%. Self monitoring of blood glucose -pt. Declines to test.  We discussed the freestyle linda cgm --he will hold off on that for now .      . Pt would benefit from Metformin :  stay on the same dose.  Actos:  Stay on same dose.   SGLT2 inhibitor (Farxiga) :  stay on the same dose.  Increased exercise--walk the dogs more.   Updated " "Hemoglobin A1c-8.7% --much worse --mtm suggested he start Trulicity -GLP-1 drug today --he declined.  Increase in home glucose monitoring-he declines to test.   Weight loss recommended--re-commit to low carb diet. We had long discussion about starting and committing to low-carb high fat diet with intermittent fasting -- he stated he thinks his wife tried this and it cured her aches/pains --he agreed to try this for 90 days then repeat a1c and add glp-1 drug if not making progress.        Hypertension: Needs Improvement. Patient is not meeting BP goal of < 140/90mmHg.  Pt would benefit from the following changes - continued BP monitoring, watching diet, increasing exercise and weight loss and sodium restriction--he really needs to flavor foods with spices instead. Losing weight will help as well.          Hyperlipidemia: Needs Improvement. Pt is not on ANY intensity statin which is indicated based on 2013 ACC/AHA guidelines for lipid management.    Patient refuses medication at this time and wants to work on lifestyle changes.        Vitamin D3: is not at goal of 50-75ng/mL. Pt would benefit from vitamin D3 lab recheck sept -2019.   Vitamin B12: is not at goal of 600-1000pg/mL. Pt would benefit from vitamin B12 lab recheck -sept -2019.     Immunizations: needs improvement-- see devaughn in our phcy for shingrix vaccine this year.    PLAN:                                                          1. A1c today = 8.7% --way too high - please read the book -\"the Diabetes code\" by Vince Duron md.      Commit to low -carb diet and intermittent fasting --this is the key!!    We can consider Trulicity once weekly to help curb appetite and also using the Freestyle linda cgm.     fyi--BP too high today 150/70mmhg.  --strive to lessen salt in diet .     2. FYI-- please consider the newest shingles vaccine (shingrix) - 2 shot vaccine --can have at our pharmacy as well.         Next MTM visit: see me July 25th at 9am for a1c recheck. "     To schedule another MTM appointment, please call the clinic directly or you may call the MTM scheduling line at [phone] or toll-free at [phone].     My Clinical Pharmacist's contact information:                                                      It was a pleasure seeing you today!  Please feel free to contact me with any questions or concerns you have.      Mally Oakley Rph.  Medication Therapy Management Provider  [phone]    You may receive a survey about the MTM services you received.  I would appreciate your feedback to help me serve you better in the future. Please fill it out and return it when you can. Your comments will be anonymous.

## 2019-04-18 ENCOUNTER — OFFICE VISIT (OUTPATIENT)
Dept: PHARMACY | Facility: CLINIC | Age: 64
End: 2019-04-18
Payer: COMMERCIAL

## 2019-04-18 VITALS
WEIGHT: 248.7 LBS | SYSTOLIC BLOOD PRESSURE: 150 MMHG | BODY MASS INDEX: 36.99 KG/M2 | DIASTOLIC BLOOD PRESSURE: 70 MMHG | OXYGEN SATURATION: 95 % | HEART RATE: 72 BPM

## 2019-04-18 DIAGNOSIS — E55.9 VITAMIN D DEFICIENCY: ICD-10-CM

## 2019-04-18 DIAGNOSIS — I10 HYPERTENSION GOAL BP (BLOOD PRESSURE) < 140/90: ICD-10-CM

## 2019-04-18 DIAGNOSIS — Z23 ENCOUNTER FOR IMMUNIZATION: ICD-10-CM

## 2019-04-18 DIAGNOSIS — E78.5 HYPERLIPIDEMIA WITH TARGET LDL LESS THAN 100: ICD-10-CM

## 2019-04-18 DIAGNOSIS — E53.8 VITAMIN B12 DEFICIENCY (NON ANEMIC): ICD-10-CM

## 2019-04-18 LAB — HBA1C MFR BLD: 8.7 % (ref 0–5.6)

## 2019-04-18 PROCEDURE — 99605 MTMS BY PHARM NP 15 MIN: CPT | Performed by: PHARMACIST

## 2019-04-18 PROCEDURE — 36415 COLL VENOUS BLD VENIPUNCTURE: CPT | Performed by: FAMILY MEDICINE

## 2019-04-18 PROCEDURE — 99607 MTMS BY PHARM ADDL 15 MIN: CPT | Performed by: PHARMACIST

## 2019-04-18 PROCEDURE — 83036 HEMOGLOBIN GLYCOSYLATED A1C: CPT | Performed by: FAMILY MEDICINE

## 2019-04-18 NOTE — PATIENT INSTRUCTIONS
"Recommendations from today's MTM visit:                                                        1. A1c today = 8.7% --way too high - please read the book -\"the Diabetes code\" by Vince Duron md.      Commit to low -carb diet and intermittent fasting --this is the key!!    We can consider Trulicity once weekly to help curb appetite and also using the Freestyle linda cgm.     fyi--BP too high today 150/70mmhg.  --strive to lessen salt in diet .     2. FYI-- please consider the newest shingles vaccine (shingrix) - 2 shot vaccine --can have at our pharmacy as well.         Next MTM visit: see me July 25th at 9am for a1c recheck.     To schedule another MTM appointment, please call the clinic directly or you may call the MTM scheduling line at [phone] or toll-free at [phone].     My Clinical Pharmacist's contact information:                                                      It was a pleasure talking with you today!  Please feel free to contact me with any questions or concerns you have.      Mally Oakley [facility].  Medication Therapy Management Provider  [phone]      You may receive a survey about the MTM services you received by email and/or US Mail.  I would appreciate your feedback to help me serve you better in the future. Your comments will be anonymous.        "

## 2019-08-20 ENCOUNTER — TELEPHONE (OUTPATIENT)
Dept: FAMILY MEDICINE | Facility: CLINIC | Age: 64
End: 2019-08-20

## 2019-08-20 NOTE — TELEPHONE ENCOUNTER
PA request from [facility] for Farxiga.  See below.  Will need to call to discuss.  Did he take Invokana?  Not on current med list.  Annmarie Paniagua RN    Phone- [phone]  ID- [id number]    Me           3/1/19 3:14 PM   Note      Fax from [facility] that Farxiga needs PA.  PA was denied with reasoning of had to try and fail Invokana.  Invokana RX sent 7/16/18.  Called patient to clarify.  States never took Invokana and has been on Farxiga about a year.  Advised PA was denied below and if has not tried and failed Invokana PA would still be denied.  Not sure if using coupon or copay card?  Is driving currently to [place].  Advised to follow-up with pharmacy when he returns.  Cancel PA request due to below.  Fax to [facility].  Annmarie Paniagua RN        July 17, 2018   Hai Fontanez RN           3:25 PM   Note      Pt informed and agrees to take Invokana  Hai Fontanez RN

## 2019-08-20 NOTE — TELEPHONE ENCOUNTER
Message left for patient to return call to clinic and ask to speak to available triage nurse     Olimpia Morgan, Registered Nurse   [facility]

## 2019-08-21 NOTE — TELEPHONE ENCOUNTER
Called patient.  He is taking Farxiga.  Using coupon card.  Has not tried Farxiga.  Discussed if at some point coupon card no longer an option would need to try Invokana prior to being able to try PA for Farxiga.  fax to pharmacy to cancel PA request for Farxiga.  Fax to [facility] at [phone].  Annmarie Paniagua RN

## 2019-09-25 ENCOUNTER — TRANSFERRED RECORDS (OUTPATIENT)
Dept: HEALTH INFORMATION MANAGEMENT | Facility: CLINIC | Age: 64
End: 2019-09-25

## 2019-09-30 ENCOUNTER — TELEPHONE (OUTPATIENT)
Dept: FAMILY MEDICINE | Facility: CLINIC | Age: 64
End: 2019-09-30

## 2019-09-30 DIAGNOSIS — E78.5 HYPERLIPIDEMIA WITH TARGET LDL LESS THAN 100: ICD-10-CM

## 2019-09-30 DIAGNOSIS — N18.2 CKD (CHRONIC KIDNEY DISEASE) STAGE 2, GFR 60-89 ML/MIN: ICD-10-CM

## 2019-09-30 DIAGNOSIS — I10 HYPERTENSION GOAL BP (BLOOD PRESSURE) < 140/90: ICD-10-CM

## 2019-09-30 NOTE — TELEPHONE ENCOUNTER
Patient called, received my chart or letter stating due for A1C and other labs, he has appointment scheduled for 10/30 but needs the orders to be placed for him to do.     Please place orders.    Shama Longo/LONG

## 2019-09-30 NOTE — TELEPHONE ENCOUNTER
Has not had visit with Dr. Owen since 9/27/18.  Has upcoming lab appt 10/30/19 and appt with Dr. Owen 11/5/19.  I ordered a FLP, CMP, A1C and microalb.  Anything else you want- PSA, Vit D, B12?  Please advise.  Annmarie Paniagua RN

## 2019-10-30 DIAGNOSIS — N18.2 CKD (CHRONIC KIDNEY DISEASE) STAGE 2, GFR 60-89 ML/MIN: ICD-10-CM

## 2019-10-30 DIAGNOSIS — I10 HYPERTENSION GOAL BP (BLOOD PRESSURE) < 140/90: ICD-10-CM

## 2019-10-30 DIAGNOSIS — E78.5 HYPERLIPIDEMIA WITH TARGET LDL LESS THAN 100: ICD-10-CM

## 2019-10-30 LAB — HBA1C MFR BLD: 10.1 % (ref 0–5.6)

## 2019-10-30 PROCEDURE — 83036 HEMOGLOBIN GLYCOSYLATED A1C: CPT | Performed by: FAMILY MEDICINE

## 2019-10-30 PROCEDURE — 82043 UR ALBUMIN QUANTITATIVE: CPT | Performed by: FAMILY MEDICINE

## 2019-10-30 PROCEDURE — 80053 COMPREHEN METABOLIC PANEL: CPT | Performed by: FAMILY MEDICINE

## 2019-10-30 PROCEDURE — 36415 COLL VENOUS BLD VENIPUNCTURE: CPT | Performed by: FAMILY MEDICINE

## 2019-10-30 PROCEDURE — 80061 LIPID PANEL: CPT | Performed by: FAMILY MEDICINE

## 2019-10-31 DIAGNOSIS — E78.5 HYPERLIPIDEMIA WITH TARGET LDL LESS THAN 100: ICD-10-CM

## 2019-10-31 LAB
ALBUMIN SERPL-MCNC: 4.3 G/DL (ref 3.4–5)
ALP SERPL-CCNC: 74 U/L (ref 40–150)
ALT SERPL W P-5'-P-CCNC: 36 U/L (ref 0–70)
ANION GAP SERPL CALCULATED.3IONS-SCNC: 7 MMOL/L (ref 3–14)
AST SERPL W P-5'-P-CCNC: 17 U/L (ref 0–45)
BILIRUB SERPL-MCNC: 0.7 MG/DL (ref 0.2–1.3)
BUN SERPL-MCNC: 14 MG/DL (ref 7–30)
CALCIUM SERPL-MCNC: 8.8 MG/DL (ref 8.5–10.1)
CHLORIDE SERPL-SCNC: 104 MMOL/L (ref 94–109)
CHOLEST SERPL-MCNC: 235 MG/DL
CO2 SERPL-SCNC: 25 MMOL/L (ref 20–32)
CREAT SERPL-MCNC: 0.86 MG/DL (ref 0.66–1.25)
CREAT UR-MCNC: 170 MG/DL
GFR SERPL CREATININE-BSD FRML MDRD: >90 ML/MIN/{1.73_M2}
GLUCOSE SERPL-MCNC: 211 MG/DL (ref 70–99)
HDLC SERPL-MCNC: 41 MG/DL
LDLC SERPL CALC-MCNC: 158 MG/DL
MICROALBUMIN UR-MCNC: 17 MG/L
MICROALBUMIN/CREAT UR: 10.18 MG/G CR (ref 0–17)
NONHDLC SERPL-MCNC: 194 MG/DL
POTASSIUM SERPL-SCNC: 4.5 MMOL/L (ref 3.4–5.3)
PROT SERPL-MCNC: 8 G/DL (ref 6.8–8.8)
SODIUM SERPL-SCNC: 136 MMOL/L (ref 133–144)
TRIGL SERPL-MCNC: 178 MG/DL

## 2019-10-31 NOTE — RESULT ENCOUNTER NOTE
Not so good. Heart attack risk has risen from 1 in 4 to now 1 in 3.Diabetes is quite poorly controlled, worse than ever. Have you been trying to stop the medicines?  Ramon Owen MD

## 2019-11-05 ENCOUNTER — OFFICE VISIT (OUTPATIENT)
Dept: FAMILY MEDICINE | Facility: CLINIC | Age: 64
End: 2019-11-05
Payer: COMMERCIAL

## 2019-11-05 VITALS
HEIGHT: 69 IN | WEIGHT: 245 LBS | SYSTOLIC BLOOD PRESSURE: 172 MMHG | DIASTOLIC BLOOD PRESSURE: 90 MMHG | BODY MASS INDEX: 36.29 KG/M2 | OXYGEN SATURATION: 98 % | TEMPERATURE: 97.9 F | RESPIRATION RATE: 16 BRPM | HEART RATE: 74 BPM

## 2019-11-05 DIAGNOSIS — Z23 NEED FOR PROPHYLACTIC VACCINATION AND INOCULATION AGAINST INFLUENZA: ICD-10-CM

## 2019-11-05 DIAGNOSIS — I10 HYPERTENSION GOAL BP (BLOOD PRESSURE) < 140/90: ICD-10-CM

## 2019-11-05 PROCEDURE — 99207 C FOOT EXAM  NO CHARGE: CPT | Mod: 25 | Performed by: FAMILY MEDICINE

## 2019-11-05 PROCEDURE — 90471 IMMUNIZATION ADMIN: CPT | Performed by: FAMILY MEDICINE

## 2019-11-05 PROCEDURE — 90682 RIV4 VACC RECOMBINANT DNA IM: CPT | Performed by: FAMILY MEDICINE

## 2019-11-05 PROCEDURE — 99214 OFFICE O/P EST MOD 30 MIN: CPT | Mod: 25 | Performed by: FAMILY MEDICINE

## 2019-11-05 RX ORDER — PIOGLITAZONEHYDROCHLORIDE 30 MG/1
30 TABLET ORAL DAILY
Qty: 90 TABLET | Refills: 0 | Status: SHIPPED | OUTPATIENT
Start: 2019-11-05 | End: 2020-05-15

## 2019-11-05 RX ORDER — METFORMIN HCL 500 MG
1000 TABLET, EXTENDED RELEASE 24 HR ORAL 2 TIMES DAILY WITH MEALS
Qty: 360 TABLET | Refills: 0 | Status: SHIPPED | OUTPATIENT
Start: 2019-11-05 | End: 2020-05-15

## 2019-11-05 RX ORDER — AMLODIPINE AND BENAZEPRIL HYDROCHLORIDE 10; 20 MG/1; MG/1
CAPSULE ORAL
Qty: 90 CAPSULE | Refills: 0 | Status: SHIPPED | OUTPATIENT
Start: 2019-11-05 | End: 2020-05-19

## 2019-11-05 RX ORDER — DAPAGLIFLOZIN 10 MG/1
10 TABLET, FILM COATED ORAL DAILY
Qty: 90 TABLET | Refills: 0 | Status: CANCELLED | OUTPATIENT
Start: 2019-11-05

## 2019-11-05 ASSESSMENT — MIFFLIN-ST. JEOR: SCORE: 1892.72

## 2019-11-05 NOTE — ASSESSMENT & PLAN NOTE
Discussed diet and exercise.    Wt Readings from Last 4 Encounters:   11/05/19 111.1 kg (245 lb)   04/18/19 112.8 kg (248 lb 11.2 oz)   09/27/18 109.3 kg (241 lb)   05/03/18 110.3 kg (243 lb 1.6 oz)

## 2019-11-05 NOTE — PROGRESS NOTES
Subjective     Bigg Lilly is a 64 year old male who presents to clinic today for the following health issues:      Diabetes:   He presents for follow up of diabetes.  He is not checking blood glucose. He is concerned about blood sugar frequently over 200.  He is not experiencing numbness or burning in feet, excessive thirst, blurry vision, weight changes or redness, sores or blisters on feet. The patient has had a diabetic eye exam in the last 12 months. Eye exam performed on sept 2019.    Diabetes Management Resources  History of Present Illness        Diabetes:   He presents for follow up of diabetes.  He is not checking blood glucose. He is concerned about blood sugar frequently over 200.  He is not experiencing numbness or burning in feet, excessive thirst, blurry vision, weight changes or redness, sores or blisters on feet. The patient has had a diabetic eye exam in the last 12 months. Eye exam performed on sept 2019.    Diabetes Management Resources     BP Readings from Last 2 Encounters:   11/05/19 (!) 172/90   04/18/19 150/70     Hemoglobin A1C (%)   Date Value   10/30/2019 10.1 (H)   04/18/2019 8.7 (H)     LDL Cholesterol Calculated (mg/dL)   Date Value   10/30/2019 158 (H)   09/25/2018 126 (H)     Uncontrolled type 2 diabetes mellitus with stage 2 chronic kidney disease, without long-term current use of insulin (H)  (primary encounter diagnosis) he is found some of his latest agents unaffordable  Hypertension goal BP (blood pressure) < 140/90 he has not taken his antihypertensives for a few days  Need for prophylactic vaccination and inoculation against influenza offered     Diabetes Management Resources    Past Medical History:   Diagnosis Date     Chest pain, unspecified 1994    dr roth     CKD (chronic kidney disease) stage 2, GFR 60-89 ml/min 3/28/2018     DM type 2, goal A1C 7-8 4/23/2014     DVT (deep venous thrombosis) 11/15/2010     Hyperlipidemia LDL goal < 100 4/23/2014     Hypertension  "goal BP (blood pressure) < 140/90 2012     Morbid obesity (H) 2014     OBESITY NOS 2007     Stasis dermatitis of both legs 2014     Tinea pedis 2014     Type 2 diabetes mellitus with stage 2 chronic kidney disease, without long-term current use of insulin (H) 2018     Type 2 diabetes mellitus without complications (H) 10/12/2015     Uncontrolled diabetes mellitus with stage 2 chronic kidney disease (H) 3/28/2018     Past Surgical History:   Procedure Laterality Date     C NONSPECIFIC PROCEDURE  1971    PILONIDAL CYST     HC COLONOSCOPY THRU STOMA, DIAGNOSTIC      neg     Family History   Problem Relation Age of Onset     Cerebrovascular Disease Mother          age 60     Hypertension Father      Heart Disease Father          age 63 Heartattack     Heart Disease Paternal Grandfather          age 55 heartattack     Social History     Tobacco Use     Smoking status: Never Smoker     Smokeless tobacco: Never Used   Substance Use Topics     Alcohol use: Yes     Alcohol/week: 3.3 standard drinks     Comment: occasional     Reviewed and updated as needed this visit by Provider       Review of Systems   ROS COMP: Constitutional, HEENT, cardiovascular, pulmonary, gi and gu systems are negative, except as otherwise noted.      Objective    BP (!) 172/90 (BP Location: Right arm, Patient Position: Chair, Cuff Size: Adult Large)   Pulse 74   Temp 97.9  F (36.6  C) (Oral)   Resp 16   Ht 1.746 m (5' 8.75\")   Wt 111.1 kg (245 lb)   SpO2 98%   BMI 36.44 kg/m    Body mass index is 36.44 kg/m .  Physical Exam   GENERAL: healthy, alert and no distress  PSYCH: mentation appears normal, affect normal/bright  Foot Exam: unremarkable sensory exam, skin warm, dry, and intact.    Diagnostic Test Results:  Labs reviewed in Epic  No results found for this or any previous visit (from the past 24 hour(s)).        Assessment & Plan   Problem List Items Addressed This Visit        Endocrine    " "Uncontrolled type 2 diabetes mellitus with stage 2 chronic kidney disease, without long-term current use of insulin (H) - Primary     Diabetes is worsening. Discussed healthy nutrition. Overdue for MTM. Hasn't been taking medications regularly. Restart empagliflozin. Repeat A1C in 7 weeks. Office visit 3 months to reassess. Foot Exam: unremarkable sensory exam, skin warm, dry, and intact.    Lab Results   Component Value Date    A1C 10.1 10/30/2019    A1C 8.7 04/18/2019    A1C 7.5 09/25/2018    A1C 8.2 05/03/2018    A1C 9.1 03/27/2018              Relevant Medications    amLODIPine-benazepril (LOTREL) 10-20 MG capsule    pioglitazone (ACTOS) 30 MG tablet    metFORMIN (GLUCOPHAGE-XR) 500 MG 24 hr tablet    empagliflozin (JARDIANCE) 10 MG TABS tablet    Other Relevant Orders    FOOT EXAM (Completed)    Hemoglobin A1c       Circulatory    Hypertension goal BP (blood pressure) < 140/90     Uncontrolled d/t stress and not taking medications. Discussed importance of taking medications regularly. Recheck in 1 week. Refill     BP Readings from Last 6 Encounters:   11/05/19 (!) 172/90   04/18/19 150/70   09/27/18 132/78   05/03/18 138/82   07/24/17 136/79   03/30/17 118/68              Relevant Medications    amLODIPine-benazepril (LOTREL) 10-20 MG capsule      Other Visit Diagnoses     Need for prophylactic vaccination and inoculation against influenza        Relevant Orders    INFLUENZA QUAD, RECOMBINANT, P-FREE (RIV4) (FLUBLOCK) [93275] (Completed)    Vaccine Administration, Initial [57826] (Completed)         BMI:   Estimated body mass index is 36.44 kg/m  as calculated from the following:    Height as of this encounter: 1.746 m (5' 8.75\").    Weight as of this encounter: 111.1 kg (245 lb).   Weight management plan: Discussed healthy diet and exercise guidelines    Return for DM f/u.    Scribe Disclosure:   Hillary MAE, am serving as a scribe; to document services personally performed by Ramon Owen MD - " -based on data collection and the provider's statements to me.     Provider Disclosure:  I agree with above History, Review of Systems, Physical exam and Plan.  I have reviewed the content of the documentation and have edited it as needed. I have personally performed the services documented here and the documentation accurately represents those services and the decisions I have made.      Electronically signed by:  Ramon Owen MD  [facility]

## 2019-11-05 NOTE — ASSESSMENT & PLAN NOTE
Diabetes is worsening. Discussed healthy nutrition. Overdue for MTM. Hasn't been taking medications regularly. Restart empagliflozin. Repeat A1C in 7 weeks. Office visit 3 months to reassess. Foot Exam: unremarkable sensory exam, skin warm, dry, and intact.    Lab Results   Component Value Date    A1C 10.1 10/30/2019    A1C 8.7 04/18/2019    A1C 7.5 09/25/2018    A1C 8.2 05/03/2018    A1C 9.1 03/27/2018

## 2019-11-05 NOTE — ASSESSMENT & PLAN NOTE
Uncontrolled d/t stress and not taking medications. Discussed importance of taking medications regularly. Recheck in 1 week. Refill     BP Readings from Last 6 Encounters:   11/05/19 (!) 172/90   04/18/19 150/70   09/27/18 132/78   05/03/18 138/82   07/24/17 136/79   03/30/17 118/68

## 2019-11-21 ENCOUNTER — MYC MEDICAL ADVICE (OUTPATIENT)
Dept: PHARMACY | Facility: CLINIC | Age: 64
End: 2019-11-21

## 2019-11-21 NOTE — TELEPHONE ENCOUNTER
11-21-19      Had discussed going back to jardiance but that cost has increased significantly so am looking at just renewing the jardiance  Was in to see the Dr in October and am scheduled back in Dec 30th for another AIC to see if any movement.  Currently no Farxiga being taken so please fill request asap.  Am taking the metformin and other ( P something ) daily    The P had run out and i had trouble getting the prescription filled in Aug and Sep  Spoke with Nurse during recent visit about emailing vs calling the pharm for renewals.  Hope this settles down the renewal filling problems  Was looking at changing the Farxiga due to cost but jardiance is now out of range  and Farxiga is manageable  150 month vs 450 month cost  Thanks      mtm refilled farxiga today , will encourage he see me for If diet plan +rybelsus.     Mally Oakley, [facility].  Medication Therapy Management Provider  [phone]

## 2019-11-21 NOTE — TELEPHONE ENCOUNTER
11-21-19      I will be in on Dec 30 for the new A1C test     I would be happy to stop the P prescription and start this new oral to see what works  Josr valera would like to see him for rybelsus start (actos stop)Ov when he comes in for A1c lab that day.     Will have pt. Make soto appt. 12-30-19.      Mally Oakley [facility].  Medication Therapy Management Provider  [phone]

## 2019-12-16 ENCOUNTER — HEALTH MAINTENANCE LETTER (OUTPATIENT)
Age: 64
End: 2019-12-16

## 2019-12-24 NOTE — PROGRESS NOTES
SUBJECTIVE/OBJECTIVE:                                                    Bigg Lilly is a 64 year old male coming in for a follow-up visit (4-18-19)for Medication Therapy Management.  He was referred to me from Dr. Owen.        Chief Complaint:  Here for repeat A1c lab . He has been working out with  once/week , wants to avoid insulin, weight has plateaued --what can he do ?      Personal Healthcare Goals:  Ultimate goal is lose enough weight and eat healthy enough and exercise daily to work his way off all his current RX medications.! < 205lbs. is weight goal.     Tobacco: No tobacco use     Medication Adherence:  No issues today     Diabetes:  Pt currently taking ER metformin 500mg.-2 bid + Farxiga 10mg./day , pioglitazone 30mg.day  Pt is not experiencing side effects: none now  SMBG: no meter yet-declines today  , offered linda cgm - not quite interested just yet.   Ranges: n/a     Symptoms of low blood sugar? none. Frequency of hypoglycemia? never.  Recent symptoms of high blood sugar? none.  Eye exam: up to date  Foot exam: due  Microalbumin is wnl and he is on an ace inhibitor.  Aspirin: 81mg asa daily now  He declines flu this year.  Diet/Exercise: walking daily  Bread is his weak link. Doing better . Only toast in am no late breads.    Trying to eat at specific times - 2.5 meals/day now -- b-fast , lunch , small dinner.    Walks as much as possible , hired a  once /week.             Lab Results   Component Value Date    A1C 8.8 12/30/2019    A1C 10.1 10/30/2019    A1C 8.7 04/18/2019    A1C 7.5 09/25/2018    A1C 8.2 05/03/2018         Hypertension: Current medications include lotrel 10-20qam.  Patient does not self-monitor BP.  Patient reports no current medication side effects.  fyi--he admits loves salt and likes to add it to his foods.     BP Readings from Last 3 Encounters:   12/30/19 118/68   11/05/19 (!) 172/90   04/18/19 150/70         Hyperlipidemia: Current therapy includes diet and  "weight loss.  We discussed DM statin lipid guidelines --pt. Does not want to start any lipid meds at this time. Recheck fasting lipids in 6 months.     Recent Labs   Lab Test 10/30/19  1240 09/25/18  0755  11/02/15  0830 06/12/15  0947   CHOL 235* 188   < > 176 177   HDL 41 44   < > 40* 39*   * 126*   < > 113 116   TRIG 178* 92   < > 117 110   CHOLHDLRATIO  --   --   --  4.4 4.5    < > = values in this interval not displayed.         Vitamin D3: level was 26 on 3-27-18. he takes 1,000 iu's /day .  Vitamin D3 helps support bone health, strengthen the immune system, and may improve mood and lessen muscle aches/pains if deficient.  Vitamin B12: level was 414 on 3-27-18, he takes 500mcg./day otc pill. Vitamin B12 helps support memory and lessens fatigue.    Immunizations:  We discussed he should consider shingrix now at his age . He state she had shingles in the past and doesn't want to get them again --will consider it .   Had flu not shingrix yet .     BP Readings from Last 1 Encounters:   12/30/19 118/68     Pulse Readings from Last 1 Encounters:   12/30/19 88     Wt Readings from Last 1 Encounters:   12/30/19 243 lb (110.2 kg)     Ht Readings from Last 1 Encounters:   11/05/19 5' 8.75\" (1.746 m)     Estimated body mass index is 36.15 kg/m  as calculated from the following:    Height as of 11/5/19: 5' 8.75\" (1.746 m).    Weight as of this encounter: 243 lb (110.2 kg).    Temp Readings from Last 1 Encounters:   11/05/19 97.9  F (36.6  C) (Oral)           ASSESSMENT:                                                    Current medications were reviewed with him today.      Medication Adherence: Excellent       Diabetes: Needs Improvement. Patient is not meeting A1c goal of < 7%. Self monitoring of blood glucose -pt. Declines to test.  We discussed the freestyle linda cgm --he will hold off on that for now as he is not yet ready for it  .      . Pt would benefit from Metformin :  stay on the same dose.  Actos:  " Stay on same dose.   SGLT2 inhibitor (Farxiga) :  stay on the same dose.  GLP-1(Rybelsus);  Start 3mg./day pill qam. See plan.   Increased exercise--walk the dogs more.   Updated Hemoglobin A1c-8.8% --Improved from 10+ %.  Increase in home glucose monitoring-he declines to test.   Weight loss recommended--re-commit to low carb diet. We had long discussion about starting and committing to low-carb high fat diet with intermittent fasting -- he stated he thinks his wife tried this and it cured her aches/pains --he agreed to try this for 90 days -see plan for details.         Hypertension: Stable. Patient is meeting BP goal of < 140/90mmHg.           Hyperlipidemia: Needs Improvement. Pt is not on ANY intensity statin which is indicated based on 2013 ACC/AHA guidelines for lipid management.    Patient refuses medication at this time and wants to work on lifestyle changes.        Vitamin D3: is not at goal of 50-75ng/mL. Pt would benefit from vitamin D3 lab recheck summer-2020.   Vitamin B12: is not at goal of 600-1000pg/mL. Pt would benefit from vitamin B12 lab recheck -summer-2020.    Immunizations: needs improvement-- see devaughn in our [facility] for shingrix vaccine this year.    PLAN:                                                          1. A1c today = 8.8% today .    Lets stay on all same Dm meds and ADD a new one --Rybelsus 3mg. /day in AM for 30 days .  Take with 4 ounces water --no food or drink for at least 30 minutes afterwards.     Try my intermittent fasting diet plan:    1. Insulin is your body's fat storage hormone. When you eat meals high in carbohydrates your pancreas releases insulin to store the excess food energy as fat. By intermittent fasting, or eating all of your daily food in an 8 hour period while fasting the remaining 16, you reduce the amount of insulin your body is releasing, resulting in less storage of food energy as fat. Focusing on a diet as low in carbohydrate as you can tolerate will also  "help reduce the amount of insulin your pancreas releases. Low carbohydrate diet + intermittent fasting will result in weight loss and improved blood sugars with fewer medications!     2. When you feel hungry in your fasting window, try drinking a zero calorie liquid, like water,coffee, tea or diet soda  first. If you still feel hungry, try eating a low carbohydrate snack like pickles.      3. Check out the website The Fasting Method.com(join free portion of website) or \"The Diabetes Code\" book by Dr. Vince Duron for more details. Dr. Duron also has a cookbook out with meals/recipes --the book is called The Obesity Code Cookbook.    4. Remember -these 7 triggers (Anxiety, stress, depression, boredom, comfort, habit, pain) make us want to eat carbohydrates -control those triggers and you will lose weight .    5. Exercise : walk minimum 7,500 steps/day .       It was great to speak with you today.  I value your experience and would be very thankful for your time with providing feedback on our clinic survey. You may receive a survey via email or text message in the next few days.     Next MTM visit:  Consider freestyle  linda cgm , see me in 1 month for weight recheck, Monday January 20th -2020 at 10;30am.     To schedule another MTM appointment, please call the clinic directly or you may call the MTM scheduling line at [phone] or toll-free at [phone].     My Clinical Pharmacist's contact information:                                                      It was a pleasure seeing you today!  Please feel free to contact me with any questions or concerns you have.      Mally Oakley [facility].  Medication Therapy Management Provider  [phone]    You may receive a survey about the MTM services you received.  I would appreciate your feedback to help me serve you better in the future. Please fill it out and return it when you can. Your comments will be anonymous.                                      "

## 2019-12-30 ENCOUNTER — ALLIED HEALTH/NURSE VISIT (OUTPATIENT)
Dept: PHARMACY | Facility: CLINIC | Age: 64
End: 2019-12-30
Payer: COMMERCIAL

## 2019-12-30 VITALS
SYSTOLIC BLOOD PRESSURE: 118 MMHG | BODY MASS INDEX: 36.15 KG/M2 | WEIGHT: 243 LBS | HEART RATE: 88 BPM | DIASTOLIC BLOOD PRESSURE: 68 MMHG | OXYGEN SATURATION: 94 %

## 2019-12-30 DIAGNOSIS — I10 HYPERTENSION GOAL BP (BLOOD PRESSURE) < 140/90: ICD-10-CM

## 2019-12-30 DIAGNOSIS — E53.8 VITAMIN B12 DEFICIENCY (NON ANEMIC): ICD-10-CM

## 2019-12-30 DIAGNOSIS — Z23 ENCOUNTER FOR IMMUNIZATION: ICD-10-CM

## 2019-12-30 DIAGNOSIS — E78.5 HYPERLIPIDEMIA WITH TARGET LDL LESS THAN 100: ICD-10-CM

## 2019-12-30 DIAGNOSIS — E55.9 VITAMIN D DEFICIENCY: ICD-10-CM

## 2019-12-30 LAB — HBA1C MFR BLD: 8.8 % (ref 0–5.6)

## 2019-12-30 PROCEDURE — 36415 COLL VENOUS BLD VENIPUNCTURE: CPT | Performed by: FAMILY MEDICINE

## 2019-12-30 PROCEDURE — 83036 HEMOGLOBIN GLYCOSYLATED A1C: CPT | Performed by: FAMILY MEDICINE

## 2019-12-30 PROCEDURE — 99606 MTMS BY PHARM EST 15 MIN: CPT | Performed by: PHARMACIST

## 2019-12-30 PROCEDURE — 99607 MTMS BY PHARM ADDL 15 MIN: CPT | Performed by: PHARMACIST

## 2019-12-30 NOTE — PATIENT INSTRUCTIONS
"Recommendations from today's MTM visit:                                                      1. A1c today = 8.8% today .    Lets stay on all same Dm meds and ADD a new one --Rybelsus 3mg. /day for 30 days .  Take with 4 ounces water --no food or drink for at least 30 minutes afterwards.     Try my intermittent fasting diet plan:    1. Insulin is your body's fat storage hormone. When you eat meals high in carbohydrates your pancreas releases insulin to store the excess food energy as fat. By intermittent fasting, or eating all of your daily food in an 8 hour period while fasting the remaining 16, you reduce the amount of insulin your body is releasing, resulting in less storage of food energy as fat. Focusing on a diet as low in carbohydrate as you can tolerate will also help reduce the amount of insulin your pancreas releases. Low carbohydrate diet + intermittent fasting will result in weight loss and improved blood sugars with fewer medications!     2. When you feel hungry in your fasting window, try drinking a zero calorie liquid, like water,coffee, tea or diet soda  first. If you still feel hungry, try eating a low carbohydrate snack like pickles.      3. Check out the website The Fasting Method.[employer](join free portion of website) or \"The Diabetes Code\" book by Dr. Vince Duron for more details. Dr. Duron also has a cookbook out with meals/recipes --the book is called The Obesity Code Cookbook.    4. Remember -these 7 triggers (Anxiety, stress, depression, boredom, comfort, habit, pain) make us want to eat carbohydrates -control those triggers and you will lose weight .    5. Exercise : walk minimum 7,500 steps/day .       It was great to speak with you today.  I value your experience and would be very thankful for your time with providing feedback on our clinic survey. You may receive a survey via email or text message in the next few days.     Next MTM visit:  Consider freestyle  linda cgm , see me in 1 month for " weight recheck, Monday January 20th -2020 at 10;30am.     To schedule another MTM appointment, please call the clinic directly or you may call the MTM scheduling line at [phone] or toll-free at [phone].     My Clinical Pharmacist's contact information:                                                      It was a pleasure talking with you today!  Please feel free to contact me with any questions or concerns you have.      Mally Oakley [facility].  Medication Therapy Management Provider  [phone]

## 2020-01-13 ENCOUNTER — TELEPHONE (OUTPATIENT)
Dept: FAMILY MEDICINE | Facility: CLINIC | Age: 65
End: 2020-01-13

## 2020-01-13 NOTE — TELEPHONE ENCOUNTER
Pharmacy sent PA needed on [facility]     PA not covered under insurance patient instructed to use savings card     RN placed call to [facility] to advise that PA will not be initiated, patient will continue to use savings card,  Patient has already picked up medication. RN asked pharmacy to update profile so next time he refills they do not send another PA request     Olimpia Morgan Registered Nurse   [facility]

## 2020-01-30 ENCOUNTER — ALLIED HEALTH/NURSE VISIT (OUTPATIENT)
Dept: PHARMACY | Facility: CLINIC | Age: 65
End: 2020-01-30
Payer: COMMERCIAL

## 2020-01-30 VITALS
BODY MASS INDEX: 35.61 KG/M2 | SYSTOLIC BLOOD PRESSURE: 116 MMHG | DIASTOLIC BLOOD PRESSURE: 70 MMHG | HEART RATE: 77 BPM | WEIGHT: 239.4 LBS | OXYGEN SATURATION: 94 %

## 2020-01-30 DIAGNOSIS — E55.9 VITAMIN D DEFICIENCY: ICD-10-CM

## 2020-01-30 DIAGNOSIS — E78.5 HYPERLIPIDEMIA WITH TARGET LDL LESS THAN 100: ICD-10-CM

## 2020-01-30 DIAGNOSIS — I10 HYPERTENSION GOAL BP (BLOOD PRESSURE) < 140/90: ICD-10-CM

## 2020-01-30 DIAGNOSIS — Z23 ENCOUNTER FOR IMMUNIZATION: ICD-10-CM

## 2020-01-30 DIAGNOSIS — E53.8 VITAMIN B12 DEFICIENCY (NON ANEMIC): ICD-10-CM

## 2020-01-30 PROCEDURE — 99605 MTMS BY PHARM NP 15 MIN: CPT | Performed by: PHARMACIST

## 2020-01-30 PROCEDURE — 99607 MTMS BY PHARM ADDL 15 MIN: CPT | Performed by: PHARMACIST

## 2020-01-30 NOTE — PROGRESS NOTES
SUBJECTIVE/OBJECTIVE:                                                    Bigg Lilly is a 64 year old male coming in for a follow-up visit (12-30-19)for Medication Therapy Management.  He was referred to me from Dr. Owen.        Chief Complaint:  Here for weight recheck, rybelsus dose increase? Geovanna cgm?    Tolerating rybelsus very well.  He read the book -the diabetes code --likes the IF diet plan. Working well.       Personal Healthcare Goals:  Ultimate goal is lose enough weight and eat healthy enough and exercise daily to work his way off all his current RX medications.! < 205lbs. is weight goal.    wants to avoid insulin!      Tobacco: No tobacco use     Medication Adherence:  No issues today     Diabetes:  Pt currently taking: rybelsus 3mg --qam --feels less hungry , ER metformin 500mg.-2 bid + Farxiga 10mg./day , pioglitazone 30mg.day  Pt is not experiencing side effects: none now  SMBG: no meter yet-declines today  , offered geovanna cgm - not quite interested just yet.   Ranges: n/a     Symptoms of low blood sugar? none. Frequency of hypoglycemia? never.  Recent symptoms of high blood sugar? none.  Eye exam: up to date  Foot exam: due  Microalbumin is wnl and he is on an ace inhibitor.  Aspirin: 81mg asa daily now  He declines flu this year.  Diet/Exercise: walking daily  Bread is his weak link. Doing better --mostly avoiding this now.      Walks as much as possible on treadmill(35 mins/day) , hired a (brother in law) once /week on wednesdays .     No snacking now last 3 weeks of January . Not starving , more fluid-mostly water  , eat window 11am-6pm.        Lab Results   Component Value Date    A1C 9.2 09/09/2020    A1C 8.8 12/30/2019    A1C 10.1 10/30/2019    A1C 8.7 04/18/2019    A1C 7.5 09/25/2018         Hypertension: Current medications include lotrel 10-20qam.  Patient does not self-monitor BP.  Patient reports no current medication side effects.  fyi--he admits loves salt and likes to add  "it to his foods.     BP Readings from Last 3 Encounters:   01/30/20 116/70   12/30/19 118/68   11/05/19 (!) 172/90         Hyperlipidemia: Current therapy includes diet and weight loss.  We discussed DM statin lipid guidelines --pt. Does not want to start any lipid meds at this time. Recheck fasting lipids in 6 months.     Recent Labs   Lab Test 09/09/20  0750 10/30/19  1240 11/02/15  0830 11/02/15  0830 06/12/15  0947   CHOL 219* 235*   < > 176 177   HDL 46 41   < > 40* 39*   * 158*   < > 113 116   TRIG 144 178*   < > 117 110   CHOLHDLRATIO  --   --   --  4.4 4.5    < > = values in this interval not displayed.         Vitamin D3: level was 26 on 3-27-18. he takes 1,000 iu's /day .  Vitamin D3 helps support bone health, strengthen the immune system, and may improve mood and lessen muscle aches/pains if deficient.  Vitamin B12: level was 414 on 3-27-18, he takes 500mcg./day otc pill. Vitamin B12 helps support memory and lessens fatigue.    Immunizations:  We discussed he should consider shingrix now at his age . He state she had shingles in the past and doesn't want to get them again --will consider it .   Had flu not shingrix yet .       BP Readings from Last 1 Encounters:   01/30/20 116/70     Pulse Readings from Last 1 Encounters:   01/30/20 77     Wt Readings from Last 1 Encounters:   01/30/20 239 lb 6.4 oz (108.6 kg)     Ht Readings from Last 1 Encounters:   11/05/19 5' 8.75\" (1.746 m)     Estimated body mass index is 35.61 kg/m  as calculated from the following:    Height as of 11/5/19: 5' 8.75\" (1.746 m).    Weight as of this encounter: 239 lb 6.4 oz (108.6 kg).    Temp Readings from Last 1 Encounters:   11/05/19 97.9  F (36.6  C) (Oral)           ASSESSMENT:                                                    Current medications were reviewed with him today.      Medication Adherence: Excellent       Diabetes: Needs Improvement. Patient is not meeting A1c goal of < 7%. Self monitoring of blood glucose " -pt. Declines to test.  We discussed the freestyle linda cgm --he will hold off on that for now as he is not yet ready for it  .      . Pt would benefit from Metformin :  stay on the same dose.  Actos:  Stay on same dose.   SGLT2 inhibitor (Farxiga) :  stay on the same dose.  GLP-1(Rybelsus);  Increase dose to 7mg./day now. mt chart mtm if intolerable nausea occurs on this dose.   Increased exercise--walk the dogs more.   Updated Hemoglobin A1c-8.8% --Improved from 10+ %.  Increase in home glucose monitoring-he declines to test.   Weight loss recommended--he is doing great on the IF diet plan and dedicated 35 minutes exercise/day .       Hypertension: Stable. Patient is meeting BP goal of < 140/90mmHg.           Hyperlipidemia: Needs Improvement. Pt is not on ANY intensity statin which is indicated based on 2013 ACC/AHA guidelines for lipid management.    Patient refuses medication at this time and wants to work on lifestyle changes.        Vitamin D3: is not at goal of 50-75ng/mL. Pt would benefit from vitamin D3 lab recheck summer-2020.   Vitamin B12: is not at goal of 600-1000pg/mL. Pt would benefit from vitamin B12 lab recheck -summer-2020.    Immunizations: needs improvement-- see devaughn in our phcy for shingrix vaccine this year.(2020).    PLAN:                                                          1. FYI-- weight today 239.4lbs --lowest ever with me --down another 4 lbs . Since rybelsus start . Lets increase the rybelsus daily dose to 7mg./day .     Daily 30 minute treadmill with raised incline as tolerated.     It was great to speak with you today.  I value your experience and would be very thankful for your time with providing feedback on our clinic survey. You may receive a survey via email or text message in the next few days.     Next MTM visit:  Thursday -march 19th -2020 at 8;50am.    I spent 30 minutes with this patient today .    To schedule another MTM appointment, please call the clinic directly  or you may call the MTM scheduling line at [phone] or toll-free at [phone].     My Clinical Pharmacist's contact information:                                                      It was a pleasure seeing you today!  Please feel free to contact me with any questions or concerns you have.      Mally Oakley [facility].  Medication Therapy Management Provider  [phone]    You may receive a survey about the [facility] services you received.  I would appreciate your feedback to help me serve you better in the future. Please fill it out and return it when you can. Your comments will be anonymous.

## 2020-01-30 NOTE — PATIENT INSTRUCTIONS
Recommendations from today's MTM visit:                                                      1. FYI-- weight today 239.4lbs --lowest ever with me --down another 4 lbs . Since rybelsus start . Lets increase the rybelsus daily dose to 7mg./day .     Daily 30 minute treadmill with raised incline as tolerated.     It was great to speak with you today.  I value your experience and would be very thankful for your time with providing feedback on our clinic survey. You may receive a survey via email or text message in the next few days.     Next MTM visit:  Thursday -march 19th -2020 at 8;50am.        To schedule another MTM appointment, please call the clinic directly or you may call the MTM scheduling line at [phone] or toll-free at [phone].     My Clinical Pharmacist's contact information:                                                      It was a pleasure talking with you today!  Please feel free to contact me with any questions or concerns you have.      Mally Oakley Rph.  Medication Therapy Management Provider  [phone]

## 2020-02-10 ENCOUNTER — TELEPHONE (OUTPATIENT)
Dept: FAMILY MEDICINE | Facility: CLINIC | Age: 65
End: 2020-02-10

## 2020-02-10 NOTE — TELEPHONE ENCOUNTER
Prior Authorization Retail Medication Request    Medication/Dose: Semaglutide (RYBELSUS) 7 MG TABS  ICD code (if different than what is on RX):  same  Previously Tried and Failed:  See Chart  Rationale:  PA Required    Insurance Name: [employer]    Insurance ID:  [id number]      Pharmacy Information (if different than what is on RX)  Name:  [id number] [address]  Phone:  [phone]    Aury - rep from [facility] sending us KEY to initiate PA. They had the wrong phone and fax number for our team to get this started.

## 2020-02-11 NOTE — TELEPHONE ENCOUNTER
PA Initiation    Medication: Semaglutide (RYBELSUS) 7 MG TABS - INITIATED  Insurance Company: [employer] - Phone [phone] Fax [phone]  Pharmacy Filling the Rx: [facility][id number] [address]  Filling Pharmacy Phone: [phone]  Filling Pharmacy Fax: [phone]  Start Date: 2/11/2020

## 2020-02-12 NOTE — TELEPHONE ENCOUNTER
Prior Authorization Approval    Authorization Effective Date: 1/12/2020  Authorization Expiration Date: 2/12/2021  Medication: Semaglutide (RYBELSUS) 7 MG TABS - APPROVED  Reference #: [id number]   Insurance Company: [employer] - Phone [phone] Fax [phone]  Which Pharmacy is filling the prescription (Not needed for infusion/clinic administered): [facility] #[id number]  Pharmacy Notified: Yes  Patient Notified: Yes    Called pharmacy to notify them of PA approval. Pharmacy was able to get a paid claim, however medication has very high cost of $762.84. Told pharmacy I would alert provider and staff about this.    Cindy Treadwell PA Team

## 2020-05-15 ENCOUNTER — MYC REFILL (OUTPATIENT)
Dept: FAMILY MEDICINE | Facility: CLINIC | Age: 65
End: 2020-05-15

## 2020-05-15 RX ORDER — METFORMIN HCL 500 MG
1000 TABLET, EXTENDED RELEASE 24 HR ORAL 2 TIMES DAILY WITH MEALS
Qty: 60 TABLET | Refills: 0 | Status: SHIPPED | OUTPATIENT
Start: 2020-05-15 | End: 2020-05-19

## 2020-05-15 RX ORDER — PIOGLITAZONEHYDROCHLORIDE 30 MG/1
30 TABLET ORAL DAILY
Qty: 30 TABLET | Refills: 0 | Status: SHIPPED | OUTPATIENT
Start: 2020-05-15 | End: 2020-05-19

## 2020-05-15 NOTE — TELEPHONE ENCOUNTER
Patient has been taking his medication and he made an phone visit with Dr. OLSON for 5/19/20.    Phoebe Morales CMA

## 2020-05-15 NOTE — TELEPHONE ENCOUNTER
Please call Josr, it doesn't look like he has been on any of his  MEDS since 2/2020?  I placed a refill for 30 days, please schedule him with Dr. Owen.  Thanks,  Susan Haase, CNP

## 2020-05-15 NOTE — TELEPHONE ENCOUNTER
Routing refill request to provider for review/approval because:  A break in medication    Ana Rosa Ortega, RN   [facility] -- Triage Nurse

## 2020-05-19 ENCOUNTER — TELEPHONE (OUTPATIENT)
Dept: FAMILY MEDICINE | Facility: CLINIC | Age: 65
End: 2020-05-19

## 2020-05-19 ENCOUNTER — VIRTUAL VISIT (OUTPATIENT)
Dept: FAMILY MEDICINE | Facility: CLINIC | Age: 65
End: 2020-05-19
Payer: COMMERCIAL

## 2020-05-19 DIAGNOSIS — Z29.9 ENCOUNTER FOR PREVENTIVE MEASURE: ICD-10-CM

## 2020-05-19 DIAGNOSIS — E66.01 MORBID OBESITY (H): ICD-10-CM

## 2020-05-19 DIAGNOSIS — I10 HYPERTENSION GOAL BP (BLOOD PRESSURE) < 140/90: ICD-10-CM

## 2020-05-19 DIAGNOSIS — E78.5 HYPERLIPIDEMIA WITH TARGET LDL LESS THAN 100: ICD-10-CM

## 2020-05-19 DIAGNOSIS — N18.2 CKD (CHRONIC KIDNEY DISEASE) STAGE 2, GFR 60-89 ML/MIN: ICD-10-CM

## 2020-05-19 PROCEDURE — 99214 OFFICE O/P EST MOD 30 MIN: CPT | Mod: TEL | Performed by: FAMILY MEDICINE

## 2020-05-19 RX ORDER — CHOLECALCIFEROL (VITAMIN D3) 50 MCG
2000 TABLET ORAL DAILY
Qty: 100 TABLET | Refills: 3 | Status: SHIPPED | OUTPATIENT
Start: 2020-05-19

## 2020-05-19 RX ORDER — AMLODIPINE AND BENAZEPRIL HYDROCHLORIDE 10; 20 MG/1; MG/1
CAPSULE ORAL
Qty: 90 CAPSULE | Refills: 1 | Status: SHIPPED | OUTPATIENT
Start: 2020-05-19 | End: 2020-09-16

## 2020-05-19 RX ORDER — ORAL SEMAGLUTIDE 7 MG/1
1 TABLET ORAL DAILY
Qty: 90 TABLET | Refills: 1 | Status: SHIPPED | OUTPATIENT
Start: 2020-05-19 | End: 2020-09-16

## 2020-05-19 RX ORDER — PIOGLITAZONEHYDROCHLORIDE 30 MG/1
30 TABLET ORAL DAILY
Qty: 90 TABLET | Refills: 1 | Status: SHIPPED | OUTPATIENT
Start: 2020-05-19 | End: 2020-09-16

## 2020-05-19 RX ORDER — CHOLECALCIFEROL (VITAMIN D3) 50 MCG
1000 TABLET ORAL DAILY
Qty: 100 TABLET | Refills: 3 | Status: SHIPPED | OUTPATIENT
Start: 2020-05-19 | End: 2020-05-19

## 2020-05-19 RX ORDER — METFORMIN HCL 500 MG
1000 TABLET, EXTENDED RELEASE 24 HR ORAL 2 TIMES DAILY WITH MEALS
Qty: 360 TABLET | Refills: 1 | Status: SHIPPED | OUTPATIENT
Start: 2020-05-19 | End: 2020-09-16

## 2020-05-19 NOTE — PROGRESS NOTES
"Bigg Lilly is a 64 year old male who is being evaluated via a billable telephone visit.      The patient has been notified of following:     \"This telephone visit will be conducted via a call between you and your physician/provider. We have found that certain health care needs can be provided without the need for a physical exam.  This service lets us provide the care you need with a short phone conversation.  If a prescription is necessary we can send it directly to your pharmacy.  If lab work is needed we can place an order for that and you can then stop by our lab to have the test done at a later time.    Telephone visits are billed at different rates depending on your insurance coverage. During this emergency period, for some insurers they may be billed the same as an in-person visit.  Please reach out to your insurance provider with any questions.    If during the course of the call the physician/provider feels a telephone visit is not appropriate, you will not be charged for this service.\"    Patient has given verbal consent for Telephone visit?  Yes    What phone number would you like to be contacted at? [phone]    How would you like to obtain your AVS? Tracey Meehan     Bigg Lilly is a 64 year old male who presents via phone visit today for the following health issues:    HPI  Diabetes Follow-up      How often are you checking your blood sugar? Not at all    What concerns do you have today about your diabetes? None     Do you have any of these symptoms? (Select all that apply)  No numbness or tingling in feet.  No redness, sores or blisters on feet.  No complaints of excessive thirst.  No reports of blurry vision.  No significant changes to weight.  Uncontrolled type 2 diabetes mellitus with stage 2 chronic kidney disease, without long-term current use of insulin (H)  (primary encounter diagnosis) patient has been working on weight loss  CKD (chronic kidney disease) stage 2, GFR 60-89 " "ml/min on ACE  Morbid obesity (H) he reports he is lost \"a few pounds\"  Hypertension goal BP (blood pressure) < 140/90 he reports outside blood pressures 130s over 80s  Hyperlipidemia with target LDL less than 100 high risk.  Declined statins  Encounter for preventive measure vitamin D refills    BP Readings from Last 2 Encounters:   01/30/20 116/70   12/30/19 118/68     Hemoglobin A1C (%)   Date Value   12/30/2019 8.8 (H)   10/30/2019 10.1 (H)     LDL Cholesterol Calculated (mg/dL)   Date Value   10/30/2019 158 (H)   09/25/2018 126 (H)           How many servings of fruits and vegetables do you eat daily?  0-1    On average, how many sweetened beverages do you drink each day (Examples: soda, juice, sweet tea, etc.  Do NOT count diet or artificially sweetened beverages)?   0    How many days per week do you exercise enough to make your heart beat faster? 4    How many minutes a day do you exercise enough to make your heart beat faster? 60 or more    How many days per week do you miss taking your medication? 0          Past Medical History:   Diagnosis Date     Chest pain, unspecified 1994    dr roth     CKD (chronic kidney disease) stage 2, GFR 60-89 ml/min 3/28/2018     DM type 2, goal A1C 7-8 4/23/2014     DVT (deep venous thrombosis) 11/15/2010     Hyperlipidemia LDL goal < 100 4/23/2014     Hypertension goal BP (blood pressure) < 140/90 9/12/2012     Morbid obesity (H) 4/23/2014     OBESITY NOS 6/30/2007     Stasis dermatitis of both legs 4/23/2014     Tinea pedis 4/24/2014     Type 2 diabetes mellitus with stage 2 chronic kidney disease, without long-term current use of insulin (H) 9/27/2018     Type 2 diabetes mellitus without complications (H) 10/12/2015     Uncontrolled diabetes mellitus with stage 2 chronic kidney disease (H) 3/28/2018       Past Surgical History:   Procedure Laterality Date     C NONSPECIFIC PROCEDURE  1971    PILONIDAL CYST     HC COLONOSCOPY THRU STOMA, DIAGNOSTIC  4/03    neg " "      Family History   Problem Relation Age of Onset     Cerebrovascular Disease Mother          age 60     Hypertension Father      Heart Disease Father          age 63 Heartattack     Heart Disease [facility] Grand[facility]          [facility]       Social History     Tobacco Use     Smoking status: Never Smoker     Smokeless tobacco: Never Used   Substance Use Topics     Alcohol use: Yes     Alcohol/week: 3.3 standard drinks     Comment: occasional         Reviewed and updated as needed this visit by Provider         Review of Systems   Patient claims medical compliance.  No visual disturbance neuropathic symptoms systemic symptoms       Objective   Reported vitals:  There were no vitals taken for this visit.   healthy, alert and no distress  PSYCH: Alert and oriented times 3; coherent speech, normal   rate and volume, able to articulate logical thoughts, able   to abstract reason, no tangential thoughts, no hallucinations   or delusions  His affect is normal  RESP: No cough, no audible wheezing, able to talk in full sentences  Remainder of exam unable to be completed due to telephone visits            Assessment/Plan:  Problem List Items Addressed This Visit        Digestive    Morbid obesity (H)     He reports \"a few pounds\" weight loss.  He continues intermittent fasting and rybelsus         Relevant Medications    empagliflozin (JARDIANCE) 25 MG TABS tablet    metFORMIN (GLUCOPHAGE-XR) 500 MG 24 hr tablet    pioglitazone (ACTOS) 30 MG tablet    Semaglutide (RYBELSUS) 7 MG TABS       Endocrine    Hyperlipidemia with target LDL less than 100     He declines statin therapy         Relevant Orders    Lipid panel reflex to direct LDL Non-fasting    Comprehensive metabolic panel    Uncontrolled type 2 diabetes mellitus with stage 2 chronic kidney disease, without long-term current use of insulin (H) - Primary     Hemoglobin A1C   Date Value Ref Range Status   2019 8.8 (H) 0 - 5.6 % Final     " Comment:     Normal <5.7% Prediabetes 5.7-6.4%  Diabetes 6.5% or higher - adopted from ADA   consensus guidelines.  Reviewed: OK with previous       Needs reassessment         Relevant Medications    amLODIPine-benazepril (LOTREL) 10-20 MG capsule    empagliflozin (JARDIANCE) 25 MG TABS tablet    metFORMIN (GLUCOPHAGE-XR) 500 MG 24 hr tablet    pioglitazone (ACTOS) 30 MG tablet    Semaglutide (RYBELSUS) 7 MG TABS    Other Relevant Orders    Lipid panel reflex to direct LDL Non-fasting    Hemoglobin A1c    Comprehensive metabolic panel       Circulatory    Hypertension goal BP (blood pressure) < 140/90     He reports blood pressures by his nurse daughter 130s over 80s         Relevant Medications    amLODIPine-benazepril (LOTREL) 10-20 MG capsule    Other Relevant Orders    Comprehensive metabolic panel       Urinary    CKD (chronic kidney disease) stage 2, GFR 60-89 ml/min    Relevant Orders    Comprehensive metabolic panel      Other Visit Diagnoses     Encounter for preventive measure        Relevant Medications    Cholecalciferol (VITAMIN D3) 50 MCG (2000 UT) TABS            No follow-ups on file.      Phone call duration:  5 minutes    Ramon Owen MD

## 2020-05-19 NOTE — TELEPHONE ENCOUNTER
[facility] call, need to resend vit D, rx is for 2,000 units and directions are for 1,000 daily, please resend for either 1,000 units or 2,000 units and may close when final  Jaz Hernandez RN, BSN  Message handled by Nurse Triage.

## 2020-05-21 ENCOUNTER — TELEPHONE (OUTPATIENT)
Dept: FAMILY MEDICINE | Facility: CLINIC | Age: 65
End: 2020-05-21

## 2020-05-21 NOTE — TELEPHONE ENCOUNTER
MTM referral from: [facility] visit (referral by provider)    MTM referral outreach attempt #2 on May 21, 2020 at 1:09 PM      Outcome: Patient not reachable after several attempts, will route to MTM Pharmacist/Provider as an FYI. Thank you for the referral.    See [facility] Pharmacy Coordinator

## 2020-06-08 ENCOUNTER — MYC MEDICAL ADVICE (OUTPATIENT)
Dept: PHARMACY | Facility: CLINIC | Age: 65
End: 2020-06-08

## 2020-06-11 ENCOUNTER — MYC MEDICAL ADVICE (OUTPATIENT)
Dept: PHARMACY | Facility: CLINIC | Age: 65
End: 2020-06-11

## 2020-06-11 RX ORDER — DAPAGLIFLOZIN 10 MG/1
10 TABLET, FILM COATED ORAL DAILY
Qty: 90 TABLET | Refills: 1 | Status: SHIPPED | OUTPATIENT
Start: 2020-06-11 | End: 2020-06-11

## 2020-06-11 RX ORDER — ERTUGLIFLOZIN 5 MG/1
1 TABLET, FILM COATED ORAL DAILY
Qty: 90 TABLET | Refills: 1 | Status: SHIPPED | OUTPATIENT
Start: 2020-06-11 | End: 2020-06-11

## 2020-06-11 NOTE — TELEPHONE ENCOUNTER
6-    Mally  The Jardiance is not covered by my insurance so i did not get the prescription  Was on the FARXIGA program   If no more program for FARXIGA i can not pay for the Jardiance  Thoughts?  Josr        mtm will check with our phcy.  Nasrin will run claims for 10mg farxiga and 25mg jardiance with rx savings coupons.    Lets see what is on his 2020 drug formualry --it should be jardiance according to [facility] formulary [place].    Nasrin verified neither jardiance or farxiga covered --mtm will try Steglatro 5mg./day -they have rx copay coupon he should be able to get drug for zero$$.    If that doesn't work will try as last resort Invokana 100mg./day . That is too $$ expensive as well.      no sglt-2 is covered will use other dm meds.    a1c lab 2nd week July --see mtm if A1c 8% or higher for new dm game plan.      Mally Oakley [facility].  Medication Therapy Management Provider  [phone]

## 2020-06-11 NOTE — TELEPHONE ENCOUNTER
6-    Mally  Thanks for contacting me   Having a problem getting the FARXIGA refilled  I am setting up [facility] labs as Dr Owen requested for 2nd week in July ( out of town vacation etc... until then )  so far so good and will see where we are  Josr valera will refill his farxiga now. He is actually on jardiance --mtm will refill that today. Actually he had 90 tabs of that refilled 1 month ago --will remind him to call his [facility] to pick it up.    Mally Oakley, [facility].  Medication Therapy Management Provider  [phone]

## 2020-09-09 ENCOUNTER — TELEPHONE (OUTPATIENT)
Dept: FAMILY MEDICINE | Facility: CLINIC | Age: 65
End: 2020-09-09

## 2020-09-09 DIAGNOSIS — N18.2 CKD (CHRONIC KIDNEY DISEASE) STAGE 2, GFR 60-89 ML/MIN: ICD-10-CM

## 2020-09-09 DIAGNOSIS — E78.5 HYPERLIPIDEMIA WITH TARGET LDL LESS THAN 100: ICD-10-CM

## 2020-09-09 DIAGNOSIS — I10 HYPERTENSION GOAL BP (BLOOD PRESSURE) < 140/90: ICD-10-CM

## 2020-09-09 LAB
ALBUMIN SERPL-MCNC: 4 G/DL (ref 3.4–5)
ALP SERPL-CCNC: 64 U/L (ref 40–150)
ALT SERPL W P-5'-P-CCNC: 34 U/L (ref 0–70)
ANION GAP SERPL CALCULATED.3IONS-SCNC: 8 MMOL/L (ref 3–14)
AST SERPL W P-5'-P-CCNC: 20 U/L (ref 0–45)
BILIRUB SERPL-MCNC: 0.9 MG/DL (ref 0.2–1.3)
BUN SERPL-MCNC: 9 MG/DL (ref 7–30)
CALCIUM SERPL-MCNC: 9 MG/DL (ref 8.5–10.1)
CHLORIDE SERPL-SCNC: 105 MMOL/L (ref 94–109)
CHOLEST SERPL-MCNC: 219 MG/DL
CO2 SERPL-SCNC: 23 MMOL/L (ref 20–32)
CREAT SERPL-MCNC: 0.86 MG/DL (ref 0.66–1.25)
GFR SERPL CREATININE-BSD FRML MDRD: >90 ML/MIN/{1.73_M2}
GLUCOSE SERPL-MCNC: 244 MG/DL (ref 70–99)
HBA1C MFR BLD: 9.2 % (ref 0–5.6)
HDLC SERPL-MCNC: 46 MG/DL
LDLC SERPL CALC-MCNC: 144 MG/DL
NONHDLC SERPL-MCNC: 173 MG/DL
POTASSIUM SERPL-SCNC: 4.1 MMOL/L (ref 3.4–5.3)
PROT SERPL-MCNC: 7.6 G/DL (ref 6.8–8.8)
SODIUM SERPL-SCNC: 136 MMOL/L (ref 133–144)
TRIGL SERPL-MCNC: 144 MG/DL

## 2020-09-09 PROCEDURE — 83036 HEMOGLOBIN GLYCOSYLATED A1C: CPT | Performed by: FAMILY MEDICINE

## 2020-09-09 PROCEDURE — 80053 COMPREHEN METABOLIC PANEL: CPT | Performed by: FAMILY MEDICINE

## 2020-09-09 PROCEDURE — 80061 LIPID PANEL: CPT | Performed by: FAMILY MEDICINE

## 2020-09-09 PROCEDURE — 36415 COLL VENOUS BLD VENIPUNCTURE: CPT | Performed by: FAMILY MEDICINE

## 2020-09-09 NOTE — RESULT ENCOUNTER NOTE
Diabetes needs better control. I see in visiting with Mally that medicine expense is an issue. Let's discuss this in person  Ramon Owen MD

## 2020-09-14 NOTE — PROGRESS NOTES
Pre-Visit Planning :     Future Appointments   Date Time Provider Department Center   9/16/2020  3:00 PM Ramon Owen MD [facility]      Arrival Time for this Appointment:  2:35 PM      Appointment Notes for this encounter:    please assist pt in scheduling MTM-Last visit w/NTN 7/20/2020  4 month f/up visit     Questionnaires Reviewed/Assigned:   YES-PHQ-2    Spoke to patient via phone. Are there any additional questions or concerns you'd like to review with your provider during your visit? N/A      Last OV with provider:  5/19/2020; Uncontrolled type 2 diabetes mellitus with stage 2 chronic kidney disease, without long-term current use of insulin (H),CKD (chronic kidney disease) stage 2, GFR 60-89 ml/min, Morbid obesity (H), Hypertension goal BP (blood pressure) < 140/90, Hyperlipidemia with target LDL less than 100, Encounter for preventive measur     Hospital ER Visits:  N/A    Is the visit preventive? No     Specialty Visits:  N/A     Imaging and Lab Review:N/A     Recent Procedures:  N/A    MEDS ;    Current Outpatient Medications   Medication     amLODIPine-benazepril (LOTREL) 10-20 MG capsule     aspirin 81 MG tablet     Cholecalciferol (VITAMIN D3) 50 MCG (2000 UT) TABS     cyanocolbalamin (VITAMIN  B-12) 500 MCG tablet     metFORMIN (GLUCOPHAGE-XR) 500 MG 24 hr tablet     pioglitazone (ACTOS) 30 MG tablet     Semaglutide (RYBELSUS) 7 MG TABS     STATIN NOT PRESCRIBED, INTENTIONAL,     No current facility-administered medications for this visit.         Is there anything on your medication list that needs to be updated?  Please ask pt pt upon check-in     Health Maintenance Due   Topic Date Due     HEPATITIS B IMMUNIZATION (1 of 3 - Risk 3-dose series) 07/16/1975     ZOSTER IMMUNIZATION (1 of 2) 07/16/2006     ADVANCE CARE PLANNING  10/17/2016     PREVENTIVE CARE VISIT  09/27/2019     PHQ-2  01/01/2020     INFLUENZA VACCINE (1) 09/01/2020     EYE EXAM  09/25/2020       Preferred pharmacy: [id number]  "- [place], [place] - [address]    MyChart: YES    Questionnaire Review :  {\"Lastly, it appears there are some questions your care team has for you.     If MyChart ACTIVE \"If you get a chance to do your questions on MyChart, your appointment will be much faster and smoother so feel free to sign in and go through those, otherwise please plan on arriving early so that you have time to complete them.\"      Patient preferred phone number: [phone]    Hortensia Weston, Registered Nurse, [facility]   [phone]    "

## 2020-09-16 ENCOUNTER — OFFICE VISIT (OUTPATIENT)
Dept: FAMILY MEDICINE | Facility: CLINIC | Age: 65
End: 2020-09-16
Payer: COMMERCIAL

## 2020-09-16 VITALS
OXYGEN SATURATION: 95 % | DIASTOLIC BLOOD PRESSURE: 64 MMHG | SYSTOLIC BLOOD PRESSURE: 110 MMHG | WEIGHT: 243.31 LBS | TEMPERATURE: 98.1 F | HEART RATE: 98 BPM | BODY MASS INDEX: 36.19 KG/M2

## 2020-09-16 DIAGNOSIS — Z12.5 SPECIAL SCREENING FOR MALIGNANT NEOPLASM OF PROSTATE: ICD-10-CM

## 2020-09-16 DIAGNOSIS — Z23 ENCOUNTER FOR IMMUNIZATION: ICD-10-CM

## 2020-09-16 DIAGNOSIS — I10 HYPERTENSION GOAL BP (BLOOD PRESSURE) < 140/90: ICD-10-CM

## 2020-09-16 DIAGNOSIS — N18.1 CKD STAGE 1 DUE TO TYPE 2 DIABETES MELLITUS (H): ICD-10-CM

## 2020-09-16 DIAGNOSIS — E66.01 MORBID OBESITY (H): ICD-10-CM

## 2020-09-16 DIAGNOSIS — Z00.00 ROUTINE GENERAL MEDICAL EXAMINATION AT A HEALTH CARE FACILITY: Primary | ICD-10-CM

## 2020-09-16 DIAGNOSIS — E78.5 HYPERLIPIDEMIA WITH TARGET LDL LESS THAN 100: ICD-10-CM

## 2020-09-16 DIAGNOSIS — E11.22 CKD STAGE 1 DUE TO TYPE 2 DIABETES MELLITUS (H): ICD-10-CM

## 2020-09-16 DIAGNOSIS — Z23 NEED FOR SHINGLES VACCINE: ICD-10-CM

## 2020-09-16 PROCEDURE — 90750 HZV VACC RECOMBINANT IM: CPT | Performed by: FAMILY MEDICINE

## 2020-09-16 PROCEDURE — 99213 OFFICE O/P EST LOW 20 MIN: CPT | Mod: 25 | Performed by: FAMILY MEDICINE

## 2020-09-16 PROCEDURE — 99207 C FOOT EXAM  NO CHARGE: CPT | Mod: 25 | Performed by: FAMILY MEDICINE

## 2020-09-16 PROCEDURE — 90682 RIV4 VACC RECOMBINANT DNA IM: CPT | Performed by: FAMILY MEDICINE

## 2020-09-16 PROCEDURE — 90471 IMMUNIZATION ADMIN: CPT | Performed by: FAMILY MEDICINE

## 2020-09-16 PROCEDURE — 90472 IMMUNIZATION ADMIN EACH ADD: CPT | Performed by: FAMILY MEDICINE

## 2020-09-16 PROCEDURE — 99396 PREV VISIT EST AGE 40-64: CPT | Mod: 25 | Performed by: FAMILY MEDICINE

## 2020-09-16 RX ORDER — METFORMIN HCL 500 MG
1000 TABLET, EXTENDED RELEASE 24 HR ORAL 2 TIMES DAILY WITH MEALS
Qty: 360 TABLET | Refills: 1 | Status: SHIPPED | OUTPATIENT
Start: 2020-09-16 | End: 2021-09-21

## 2020-09-16 RX ORDER — ORAL SEMAGLUTIDE 7 MG/1
1 TABLET ORAL DAILY
Qty: 90 TABLET | Refills: 1 | Status: SHIPPED | OUTPATIENT
Start: 2020-09-16 | End: 2020-12-30 | Stop reason: DRUGHIGH

## 2020-09-16 RX ORDER — PIOGLITAZONEHYDROCHLORIDE 30 MG/1
30 TABLET ORAL DAILY
Qty: 90 TABLET | Refills: 1 | Status: SHIPPED | OUTPATIENT
Start: 2020-09-16 | End: 2021-09-21

## 2020-09-16 RX ORDER — GLIPIZIDE 10 MG/1
10 TABLET ORAL
Qty: 180 TABLET | Refills: 1 | Status: SHIPPED | OUTPATIENT
Start: 2020-09-16 | End: 2021-04-29

## 2020-09-16 RX ORDER — AMLODIPINE AND BENAZEPRIL HYDROCHLORIDE 10; 20 MG/1; MG/1
CAPSULE ORAL
Qty: 90 CAPSULE | Refills: 1 | Status: SHIPPED | OUTPATIENT
Start: 2020-09-16 | End: 2020-12-30

## 2020-09-16 NOTE — PROGRESS NOTES
SUBJECTIVE:   CC: Bigg Lilly is an 65 year old male who presents for preventative health visit.       Patient has been advised of split billing requirements and indicates understanding: Yes  Healthy Habits:   PHQ-2 Total Score: 0          )  Uncontrolled type 2 diabetes mellitus with stage 2 chronic kidney disease, without long-term current use of insulin (H)   Hemoglobin A1C   Date Value Ref Range Status   09/09/2020 9.2 (H) 0 - 5.6 % Final     Comment:     Results confirmed by repeat test  Normal <5.7% Prediabetes 5.7-6.4%  Diabetes 6.5% or higher - adopted from ADA   consensus guidelines.         Morbid obesity (H) Body mass index is 36.19 kg/m .    Hypertension goal BP (blood pressure) < 140/90   BP Readings from Last 1 Encounters:   09/16/20 110/64       Need for shingles vaccine offered  Encounter for immunization  Special screening for malignant neoplasm of prostate discussed        Today's PHQ-2 Score:   PHQ-2 ( 1999 Pfizer) 9/16/2020   Q1: Little interest or pleasure in doing things 0   Q2: Feeling down, depressed or hopeless 0   PHQ-2 Score 0   Q1: Little interest or pleasure in doing things Not at all   Q2: Feeling down, depressed or hopeless Not at all   PHQ-2 Score 0       Abuse: Current or Past(Physical, Sexual or Emotional)- No  Do you feel safe in your environment? Yes    Have you ever done Advance Care Planning? (For example, a Health Directive, POLST, or a discussion with a medical provider or your loved ones about your wishes):     Social History     Tobacco Use     Smoking status: Never Smoker     Smokeless tobacco: Never Used   Substance Use Topics     Alcohol use: Yes     Alcohol/week: 3.3 standard drinks     Comment: occasional           Last PSA:   PSA   Date Value Ref Range Status   09/27/2018 0.63 0 - 4 ug/L Final     Comment:     Assay Method:  Chemiluminescence using Siemens Vista analyzer       Reviewed orders with patient. Reviewed health maintenance and updated orders accordingly -  Yes      Reviewed and updated as needed this visit by clinical staff  Tobacco  Allergies         Reviewed and updated as needed this visit by Provider        Past Medical History:   Diagnosis Date     Chest pain, unspecified     dr roth     CKD (chronic kidney disease) stage 2, GFR 60-89 ml/min 3/28/2018     DM type 2, goal A1C 7-8 2014     DVT (deep venous thrombosis) 11/15/2010     Hyperlipidemia LDL goal < 100 2014     Hypertension goal BP (blood pressure) < 140/90 2012     Morbid obesity (H) 2014     OBESITY NOS 2007     Stasis dermatitis of both legs 2014     Tinea pedis 2014     Type 2 diabetes mellitus with stage 2 chronic kidney disease, without long-term current use of insulin (H) 2018     Type 2 diabetes mellitus without complications (H) 10/12/2015     Uncontrolled diabetes mellitus with stage 2 chronic kidney disease (H) 3/28/2018       Past Surgical History:   Procedure Laterality Date     C NONSPECIFIC PROCEDURE      PILONIDAL CYST     HC COLONOSCOPY THRU STOMA, DIAGNOSTIC      neg       Family History   Problem Relation Age of Onset     Cerebrovascular Disease Mother          age 60     Hypertension Father      Heart Disease Father          age 63 Heartattack     Heart Disease Paternal Grandfather          age 55 heartattack       Social History     Tobacco Use     Smoking status: Never Smoker     Smokeless tobacco: Never Used   Substance Use Topics     Alcohol use: Yes     Alcohol/week: 3.3 standard drinks     Comment: occasional         Review of Systems   Constitutional: Negative for appetite change and fever.   HENT: Negative.    Eyes: Negative.    Respiratory: Negative for cough and shortness of breath.    Cardiovascular:        See additional note   Gastrointestinal: Negative for constipation, diarrhea and heartburn.   Genitourinary: Negative for difficulty urinating and urgency.   Musculoskeletal: Negative for back pain and  joint swelling.   Skin: Negative for rash.   Neurological: Negative for weakness and headaches.   Hematological: Negative for adenopathy.   Psychiatric/Behavioral: Negative for dysphoric mood and sleep disturbance.         OBJECTIVE:   /64 (BP Location: Right arm, Patient Position: Chair, Cuff Size: Adult Large)   Pulse 98   Temp 98.1  F (36.7  C) (Oral)   Wt 110.4 kg (243 lb 5 oz)   SpO2 95%   BMI 36.19 kg/m      Physical Exam  Constitutional:       Appearance: Normal appearance.   HENT:      Head: Atraumatic.      Right Ear: Tympanic membrane normal.      Left Ear: Tympanic membrane normal.      Nose: Nose normal.      Mouth/Throat:      Mouth: Mucous membranes are moist.   Eyes:      Pupils: Pupils are equal, round, and reactive to light.   Neck:      Musculoskeletal: Neck supple.   Cardiovascular:      Rate and Rhythm: Normal rate and regular rhythm.      Heart sounds: Normal heart sounds.   Pulmonary:      Effort: Pulmonary effort is normal.      Breath sounds: Normal breath sounds.   Abdominal:      General: Abdomen is flat. Bowel sounds are normal.   Skin:     General: Skin is warm and dry.   Neurological:      General: No focal deficit present.      Mental Status: He is alert.   Psychiatric:         Mood and Affect: Mood normal.               ASSESSMENT/PLAN:     Problem List Items Addressed This Visit        Digestive    Morbid obesity (H)     He considers Rybelsus a weight loss agent. He finds it too expensive.  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     On ARB         Relevant Medications    metFORMIN (GLUCOPHAGE-XR) 500 MG 24 hr tablet    Semaglutide (RYBELSUS) 7 MG TABS    pioglitazone (ACTOS) 30 MG tablet    glipiZIDE (GLUCOTROL) 10 MG tablet    Uncontrolled type 2 diabetes mellitus with stage 2 chronic kidney disease, without long-term current use of insulin (H)     He will be meeting with MTM.  Newer diabetic agents are not affordable add sulfonylurea         Relevant Medications    metFORMIN (GLUCOPHAGE-XR) 500 MG 24 hr tablet    Semaglutide (RYBELSUS) 7 MG TABS    pioglitazone (ACTOS) 30 MG tablet    amLODIPine-benazepril (LOTREL) 10-20 MG capsule    glipiZIDE (GLUCOTROL) 10 MG tablet    Other Relevant Orders    FOOT EXAM (Completed)    MED THERAPY MANAGE REFERRAL       Circulatory    Hypertension goal BP (blood pressure) < 140/90     Controlled.  Continue         Relevant Medications    amLODIPine-benazepril (LOTREL) 10-20 MG capsule      Other Visit Diagnoses     Routine general medical examination at a health care facility    -  Primary    Need for shingles vaccine        Relevant Orders    ZOSTER VACCINE RECOMBINANT ADJUVANTED IM NJX (Completed)    Encounter for immunization        Special screening for malignant neoplasm of prostate        Relevant Orders    PSA, screen          Patient has been advised of split billing requirements and indicates understanding: Yes  COUNSELING:   Reviewed preventive health counseling, as reflected in patient instructions    Estimated body mass index is 36.19 kg/m  as calculated from the following:    Height as of 11/5/19: 1.746 m (5' 8.75\").    Weight as of this encounter: 110.4 kg (243 lb 5 oz).     Weight management plan: He would like to lose weight, but declines interventions    He reports that he has never smoked. He has never used smokeless " tobacco.      Counseling Resources:  ATP IV Guidelines  Pooled Cohorts Equation Calculator  FRAX Risk Assessment  ICSI Preventive Guidelines  Dietary Guidelines for Americans, 2010  USDA's MyPlate  ASA Prophylaxis  Lung CA Screening    Ramon Owen MD  [facility]

## 2020-09-16 NOTE — PROGRESS NOTES
"  Patient's spouse has requested that patient \"get his heart checked out\"  ROS no angina palpitations dyspnea  /64 (BP Location: Right arm, Patient Position: Chair, Cuff Size: Adult Large)   Pulse 98   Temp 98.1  F (36.7  C) (Oral)   Wt 110.4 kg (243 lb 5 oz)   SpO2 95%   BMI 36.19 kg/m    The 10-year ASCVD risk score (Tete BROOKS Jr., et al., 2013) is: 21.8%    Values used to calculate the score:      Age: 65 years      Sex: Male      Is Non- : No      Diabetic: Yes      Tobacco smoker: No      Systolic Blood Pressure: 110 mmHg      Is BP treated: Yes      HDL Cholesterol: 46 mg/dL      Total Cholesterol: 219 mg/dL    ASSESSMENT / PLAN:          (E78.5) Hyperlipidemia with target LDL less than 100  Comment: We discussed his current risk and recommended a statin.  Plan: Patient is opposed to statin.  Discussed the benefits.  Discussed the absence of beneficial effect to lifestyle attempts to reduce cardiovascular risk.  Discussed his risk factors.  Recommended rapid transport to ED for cardiac symptoms, described in detail          Ramon Owen MD    "

## 2020-09-16 NOTE — PATIENT INSTRUCTIONS
Preventive Health Recommendations  Male Ages 50 - 64    Yearly exam:             See your health care provider every year in order to  o   Review health changes.   o   Discuss preventive care.    o   Review your medicines if your doctor has prescribed any.     Have a cholesterol test every 5 years, or more frequently if you are at risk for high cholesterol/heart disease.     Have a diabetes test (fasting glucose) every three years. If you are at risk for diabetes, you should have this test more often.     Have a colonoscopy at age 50, or have a yearly FIT test (stool test). These exams will check for colon cancer.      Talk with your health care provider about whether or not a prostate cancer screening test (PSA) is right for you.    You should be tested each year for STDs (sexually transmitted diseases), if you re at risk.     Shots: Get a flu shot each year. Get a tetanus shot every 10 years.     Nutrition:    Eat at least 5 servings of fruits and vegetables daily.     Eat whole-grain bread, whole-wheat pasta and brown rice instead of white grains and rice.     Get adequate Calcium and Vitamin D.     Lifestyle    Exercise for at least 150 minutes a week (30 minutes a day, 5 days a week). This will help you control your weight and prevent disease.     Limit alcohol to one drink per day.     No smoking.     Wear sunscreen to prevent skin cancer.     See your dentist every six months for an exam and cleaning.     See your eye doctor every 1 to 2 years.    Heart attack risk is 1 in 5

## 2020-09-17 PROBLEM — E11.22 CKD STAGE 1 DUE TO TYPE 2 DIABETES MELLITUS (H): Status: ACTIVE | Noted: 2018-03-28

## 2020-09-17 PROBLEM — N18.1 CKD STAGE 1 DUE TO TYPE 2 DIABETES MELLITUS (H): Status: ACTIVE | Noted: 2018-03-28

## 2020-09-17 ASSESSMENT — ENCOUNTER SYMPTOMS
JOINT SWELLING: 0
SHORTNESS OF BREATH: 0
FEVER: 0
DIFFICULTY URINATING: 0
BACK PAIN: 0
SLEEP DISTURBANCE: 0
HEARTBURN: 0
DIARRHEA: 0
APPETITE CHANGE: 0
COUGH: 0
WEAKNESS: 0
DYSPHORIC MOOD: 0
CONSTIPATION: 0
ADENOPATHY: 0
HEADACHES: 0
EYES NEGATIVE: 1

## 2020-09-17 NOTE — ASSESSMENT & PLAN NOTE
He considers Rybelsus a weight loss agent. He finds it too expensive.  Bariatric referral discussed, declined

## 2020-09-17 NOTE — ASSESSMENT & PLAN NOTE
GFR Estimate   Date Value Ref Range Status   09/09/2020 >90 >60 mL/min/[1.73_m2] Final     Comment:     Non  GFR Calc  Starting 12/18/2018, serum creatinine based estimated GFR (eGFR) will be   calculated using the Chronic Kidney Disease Epidemiology Collaboration   (CKD-EPI) equation.     On ARB

## 2020-09-18 ENCOUNTER — TELEPHONE (OUTPATIENT)
Dept: FAMILY MEDICINE | Facility: CLINIC | Age: 65
End: 2020-09-18

## 2020-09-18 NOTE — TELEPHONE ENCOUNTER
MTM referral from: [facility] visit (referral by provider)    MTM referral outreach attempt #2 on September 18, 2020 at 12:42 PM      Outcome: Patient not reachable after several attempts, will route to MTM Pharmacist/Provider as an FYI. Thank you for the referral.    Hilaria Arellano, MTM Coordinator

## 2020-09-21 NOTE — TELEPHONE ENCOUNTER
Sent [employer] message regarding MTM visit.  Ileana Ibarra, PharmD  Medication Therapy Management Provider, [facility]  Pager: [phone]

## 2020-10-01 ENCOUNTER — TRANSFERRED RECORDS (OUTPATIENT)
Dept: HEALTH INFORMATION MANAGEMENT | Facility: CLINIC | Age: 65
End: 2020-10-01

## 2020-10-01 LAB
RETINOPATHY: NORMAL
RETINOPATHY: NORMAL

## 2020-11-17 ENCOUNTER — ALLIED HEALTH/NURSE VISIT (OUTPATIENT)
Dept: FAMILY MEDICINE | Facility: CLINIC | Age: 65
End: 2020-11-17
Payer: COMMERCIAL

## 2020-11-17 DIAGNOSIS — Z23 NEED FOR SHINGLES VACCINE: Primary | ICD-10-CM

## 2020-11-17 PROCEDURE — 90471 IMMUNIZATION ADMIN: CPT

## 2020-11-17 PROCEDURE — 99207 PR NO CHARGE NURSE ONLY: CPT

## 2020-11-17 PROCEDURE — 90750 HZV VACC RECOMBINANT IM: CPT

## 2020-12-10 ENCOUNTER — MYC REFILL (OUTPATIENT)
Dept: FAMILY MEDICINE | Facility: CLINIC | Age: 65
End: 2020-12-10

## 2020-12-10 ENCOUNTER — MYC MEDICAL ADVICE (OUTPATIENT)
Dept: FAMILY MEDICINE | Facility: CLINIC | Age: 65
End: 2020-12-10

## 2020-12-10 RX ORDER — METFORMIN HCL 500 MG
1000 TABLET, EXTENDED RELEASE 24 HR ORAL 2 TIMES DAILY WITH MEALS
Qty: 360 TABLET | Refills: 1 | Status: CANCELLED | OUTPATIENT
Start: 2020-12-10

## 2020-12-10 RX ORDER — GLIPIZIDE 10 MG/1
10 TABLET ORAL
Qty: 180 TABLET | Refills: 1 | Status: CANCELLED | OUTPATIENT
Start: 2020-12-10

## 2020-12-10 RX ORDER — ORAL SEMAGLUTIDE 7 MG/1
1 TABLET ORAL DAILY
Qty: 90 TABLET | Refills: 1 | Status: CANCELLED | OUTPATIENT
Start: 2020-12-10

## 2020-12-10 RX ORDER — PIOGLITAZONEHYDROCHLORIDE 30 MG/1
30 TABLET ORAL DAILY
Qty: 90 TABLET | Refills: 1 | Status: CANCELLED | OUTPATIENT
Start: 2020-12-10

## 2020-12-11 NOTE — TELEPHONE ENCOUNTER
Has existing refills, sent [employer] message informing  Jaz Hernandez RN, BSN  Message handled by CLINIC NURSE.

## 2020-12-29 DIAGNOSIS — Z12.5 SPECIAL SCREENING FOR MALIGNANT NEOPLASM OF PROSTATE: ICD-10-CM

## 2020-12-29 LAB
CREAT UR-MCNC: 154 MG/DL
HBA1C MFR BLD: 9.7 % (ref 0–5.6)
MICROALBUMIN UR-MCNC: 12 MG/L
MICROALBUMIN/CREAT UR: 8.05 MG/G CR (ref 0–17)
PSA SERPL-ACNC: 0.67 UG/L (ref 0–4)

## 2020-12-29 PROCEDURE — 82043 UR ALBUMIN QUANTITATIVE: CPT | Performed by: FAMILY MEDICINE

## 2020-12-29 PROCEDURE — G0103 PSA SCREENING: HCPCS | Performed by: FAMILY MEDICINE

## 2020-12-29 PROCEDURE — 83036 HEMOGLOBIN GLYCOSYLATED A1C: CPT | Performed by: FAMILY MEDICINE

## 2020-12-29 PROCEDURE — 36415 COLL VENOUS BLD VENIPUNCTURE: CPT | Performed by: FAMILY MEDICINE

## 2020-12-29 NOTE — RESULT ENCOUNTER NOTE
Prostate screen is OK. Ileana is quite smart and well trained: she will help with that darn diabetes  Ramon Owen MD

## 2020-12-30 ENCOUNTER — ALLIED HEALTH/NURSE VISIT (OUTPATIENT)
Dept: PHARMACY | Facility: CLINIC | Age: 65
End: 2020-12-30
Payer: COMMERCIAL

## 2020-12-30 DIAGNOSIS — I10 HYPERTENSION GOAL BP (BLOOD PRESSURE) < 140/90: ICD-10-CM

## 2020-12-30 DIAGNOSIS — E53.8 VITAMIN B12 DEFICIENCY (NON ANEMIC): ICD-10-CM

## 2020-12-30 DIAGNOSIS — E78.5 HYPERLIPIDEMIA WITH TARGET LDL LESS THAN 100: ICD-10-CM

## 2020-12-30 DIAGNOSIS — E55.9 VITAMIN D DEFICIENCY: ICD-10-CM

## 2020-12-30 PROCEDURE — 99607 MTMS BY PHARM ADDL 15 MIN: CPT | Mod: TEL | Performed by: PHARMACIST

## 2020-12-30 PROCEDURE — 99606 MTMS BY PHARM EST 15 MIN: CPT | Mod: TEL | Performed by: PHARMACIST

## 2020-12-30 RX ORDER — ORAL SEMAGLUTIDE 14 MG/1
14 TABLET ORAL DAILY
Qty: 30 TABLET | Refills: 5 | Status: SHIPPED | OUTPATIENT
Start: 2020-12-30 | End: 2021-05-17 | Stop reason: DRUGHIGH

## 2020-12-30 NOTE — PATIENT INSTRUCTIONS
Recommendations from today's MTM visit:                                                       1. Increase Rybelsus to 14mg daily.  2. Start Jardiance 10mg daily and decrease glipizide to 5mg twice daily.   3. Consider intermittent fasting again and increasing exercise - at least 150min/week.     It was great to speak with you today.  I value your experience and would be very thankful for your time with providing feedback on our clinic survey. You may receive a survey via email or text message in the next few days.   To schedule another MTM appointment, please call the clinic directly or you may call the MTM scheduling line at [phone] or toll-free at [phone].     My Clinical Pharmacist's contact information:                                                      It was a pleasure talking with you today!  Please feel free to contact me with any questions or concerns you have.      Ileana Ibarra, PharmD  Medication Therapy Management Provider, [facility]

## 2020-12-30 NOTE — PROGRESS NOTES
MTM ENCOUNTER  SUBJECTIVE/OBJECTIVE:                           Bigg Lilly is a 65 year old male called for a follow-up visit. He was referred to me from Dr. Owen.  Today's visit is a follow-up MTM visit from 1/30 with Mally Oakley Rph.     Reason for visit: Lab and medication review, elevated a1c.    Tobacco: He reports that he has never smoked. He has never used smokeless tobacco.    Personal Healthcare Goals:  Ultimate goal is lose enough weight and eat healthy enough and exercise daily to work his way off all his current RX medications! < 205lbs. is weight goal.     Medication Adherence/Access: no issues reported now, getting his medications but was out of his pills for about a week    Type 2 Diabetes:  Currently taking Rybelsus 7mg daily, glipizide 10mg twice daily, metformin XR 1000mg twice daily, pioglitazone 30mg daily. Patient is not experiencing side effects. Out for past week of medications.   Blood sugar monitoring: never.   Symptoms of low blood sugar? none  Symptoms of high blood sugar? none  Eye exam: up to date  Foot exam: up to date  Diet/Exercise: Started using an exercise  but hasn't used since beginning of Dec. Tries to walk with wife, also just got a treadmill. Tries to avoid white breads and carbs. Stops eating after 6pm. Holidays are difficult. Wife is a believer in chicken, occasionally oatmeal or eggs for breakfast. Lunch is chicken or veggies. Previously tried intermittent fasting. Wife may be interested in it again. He is considering.   Aspirin: Taking 81mg daily and denies side effects  Statin: No   ACEi/ARB: No.   Urine Albumin:   Lab Results   Component Value Date    UMALCR 8.05 12/29/2020      Lab Results   Component Value Date    A1C 9.7 12/29/2020    A1C 9.2 09/09/2020    A1C 8.8 12/30/2019    A1C 10.1 10/30/2019    A1C 8.7 04/18/2019   238 lbs now  Wt Readings from Last 3 Encounters:   09/16/20 243 lb 5 oz (110.4 kg)   01/30/20 239 lb 6.4 oz (108.6 kg)   12/30/19 243  lb (110.2 kg)       Hypertension: Currently taking no medications.  Patient does self-monitor blood pressure. Home BP monitoring in range of 120's systolic over 80's diastolic.  BP Readings from Last 3 Encounters:   09/16/20 110/64   01/30/20 116/70   12/30/19 118/68       Hyperlipidemia: Current therapy includes no current medications. Prefers no statin medications. States his cholesterol has been good.   The 10-year ASCVD risk score (Tete BROOKS Jr., et al., 2013) is: 18.9%    Values used to calculate the score:      Age: 65 years      Sex: Male      Is Non- : No      Diabetic: Yes      Tobacco smoker: No      Systolic Blood Pressure: 110 mmHg      Is BP treated: No      HDL Cholesterol: 46 mg/dL      Total Cholesterol: 219 mg/dL  Recent Labs   Lab Test 09/09/20  0750 10/30/19  1240 11/02/15  0830 11/02/15  0830 06/12/15  0947   CHOL 219* 235*   < > 176 177   HDL 46 41   < > 40* 39*   * 158*   < > 113 116   TRIG 144 178*   < > 117 110   CHOLHDLRATIO  --   --   --  4.4 4.5    < > = values in this interval not displayed.       Supplements: Patient taking vitamin D 2000 units daily and vitamin B12 500mcg daily. Also started vitamin C daily. No issues reported.     Today's Vitals: There were no vitals taken for this visit.      ASSESSMENT:                              Medication Adherence: No issues identified, patient picking up medications now to restart as planned    Type 2 Diabetes: Patient is not meeting A1c goal of < 7.5%. He would benefit from increase Rybelsus dose and adding back in Jardiance. Will decrease glipizide dose to assist with further weight loss. Also further discussed increasing exercise and improving diet.     Hypertension: Stable.    Hyperlipidemia: Patient is not on moderate intensity statin which is indicated based on 2019 ACC/AHA guidelines for lipid management. Discussed risk vs benefits with him and LDL is not ideal - would like <100. He will work on improving  diet and exercise and recheck lipid panel again in 3 months. If not improved, he may consider statin therapy.     Supplements: stable    PLAN:                            1. Increase Rybelsus to 14mg daily.  2. Start Jardiance 10mg daily and decrease glipizide to 5mg twice daily.   3. Discussed improving diet and increasing exercise - at least 150min/week.     I spent 20 minutes with this patient today. All changes were made via collaborative practice agreement with Ramon Owen. A copy of the visit note was provided to the patient's primary care provider.    Will follow up in 2 months, labs in 3 months.    The patient was sent via [facility] a summary of these recommendations.     Ileana Ibarra, PharmD  Medication Therapy Management Provider, [facility]  Pager: [phone]    Patient consented to a telehealth visit: yes  Telemedicine Visit Details  Type of service:  Telephone visit  Start Time: 3:00 PM  End Time: 3:21 PM  Originating Location (patient location): Home  Distant Location (provider location):  [facility]  Mode of Communication:  Telephone        Medication Therapy Recommendations  Uncontrolled type 2 diabetes mellitus with stage 2 chronic kidney disease, without long-term current use of insulin (H)    Current Medication: empagliflozin (JARDIANCE) 10 MG TABS tablet   Rationale: Synergistic therapy - Needs additional medication therapy - Indication   Recommendation: Start Medication - glipiZIDE 10 MG tablet - Start Jardiance 10mg daily and decrease glipizide to 5mg twice daily.   Status: Accepted per CPA          Current Medication: Semaglutide (RYBELSUS) 14 MG TABS   Rationale: Dose too low - Dosage too low - Effectiveness   Recommendation: Increase Dose   Status: Accepted per CPA

## 2021-04-02 ENCOUNTER — DOCUMENTATION ONLY (OUTPATIENT)
Dept: FAMILY MEDICINE | Facility: CLINIC | Age: 66
End: 2021-04-02

## 2021-04-02 NOTE — PROGRESS NOTES
RN chart review.  Per Ramon Owen MD    list, pt should be listed as SB #2  ,  and is currently SB # 3 .  RN changed to SB # 2 .    Hortensia Weston, Registered Nurse, PAL (Patient Advocate Liaison)  [facility]     [phone]

## 2021-04-22 ENCOUNTER — DOCUMENTATION ONLY (OUTPATIENT)
Dept: LAB | Facility: CLINIC | Age: 66
End: 2021-04-22

## 2021-04-22 NOTE — PROGRESS NOTES
Bigg Lilly has an upcoming lab appointment:    Future Appointments   Date Time Provider Department Center   4/28/2021  7:15 AM CR LAB CRLAB CR   4/29/2021 11:00 AM Ileana Ibarra [facility] CRMTM    5/14/2021  7:00 AM Aj Schultz MD [facility]      Please review Health Maintenance and sign order: Review of Health Maintenance Protocol Orders (HMPO) to authorize patient's due Health Maintenance labs to be drawn.    Health Maintenance Due   Topic     ANNUAL REVIEW OF HM ORDERS      CMP      LIPID      A1C      Juan J Chandra

## 2021-04-28 DIAGNOSIS — Z13.220 SCREENING FOR HYPERLIPIDEMIA: Primary | ICD-10-CM

## 2021-04-28 LAB
ALBUMIN SERPL-MCNC: 3.7 G/DL (ref 3.4–5)
ALP SERPL-CCNC: 81 U/L (ref 40–150)
ALT SERPL W P-5'-P-CCNC: 29 U/L (ref 0–70)
ANION GAP SERPL CALCULATED.3IONS-SCNC: 4 MMOL/L (ref 3–14)
AST SERPL W P-5'-P-CCNC: 15 U/L (ref 0–45)
BILIRUB SERPL-MCNC: 0.5 MG/DL (ref 0.2–1.3)
BUN SERPL-MCNC: 12 MG/DL (ref 7–30)
CALCIUM SERPL-MCNC: 8.8 MG/DL (ref 8.5–10.1)
CHLORIDE SERPL-SCNC: 106 MMOL/L (ref 94–109)
CHOLEST SERPL-MCNC: 205 MG/DL
CO2 SERPL-SCNC: 27 MMOL/L (ref 20–32)
CREAT SERPL-MCNC: 0.83 MG/DL (ref 0.66–1.25)
GFR SERPL CREATININE-BSD FRML MDRD: >90 ML/MIN/{1.73_M2}
GLUCOSE SERPL-MCNC: 311 MG/DL (ref 70–99)
HBA1C MFR BLD: 12 % (ref 0–5.6)
HDLC SERPL-MCNC: 46 MG/DL
LDLC SERPL CALC-MCNC: 127 MG/DL
NONHDLC SERPL-MCNC: 159 MG/DL
POTASSIUM SERPL-SCNC: 4.2 MMOL/L (ref 3.4–5.3)
PROT SERPL-MCNC: 7.3 G/DL (ref 6.8–8.8)
SODIUM SERPL-SCNC: 137 MMOL/L (ref 133–144)
TRIGL SERPL-MCNC: 160 MG/DL

## 2021-04-28 PROCEDURE — 80053 COMPREHEN METABOLIC PANEL: CPT | Performed by: FAMILY MEDICINE

## 2021-04-28 PROCEDURE — 36415 COLL VENOUS BLD VENIPUNCTURE: CPT | Performed by: FAMILY MEDICINE

## 2021-04-28 PROCEDURE — 80061 LIPID PANEL: CPT | Performed by: FAMILY MEDICINE

## 2021-04-28 PROCEDURE — 83036 HEMOGLOBIN GLYCOSYLATED A1C: CPT | Performed by: FAMILY MEDICINE

## 2021-04-28 NOTE — PROGRESS NOTES
Medication Therapy Management (MTM) Encounter    ASSESSMENT:                            Medication Adherence/Access: Was not taking any of his medications, will restart everything now    Type 2 Diabetes: Patient is not meeting A1c goal of < 8% although he probably would benefit most from a goal of <7%. He will restart all of his medications.    Hyperlipidemia: Patient is not on moderate intensity statin which is indicated based on 2019 ACC/AHA guidelines for lipid management. Discussed risk vs benefits with him and LDL is not ideal - would like <100. He will work on improving diet and exercise. Declines statin therapy.   Supplements: stable  ED: stable    PLAN:                            1. Restart metformin 500 mg daily for 3-4 days then incresae to 500mg twice daily for 3-4 days and keep increasing every 3-4 days until 1000mg twice daily.  2. Restart Jardiance 10 mg daily and in 2-3 weeks increase Jardiance 25mg daily.  3. Restart pioglitazone 15mg daily for 2-3 weeks then increase to 30mg daily.  4. Restart Rybelsus 7 mg daily and in 1 month increase Rybelsus to 14 daily.   5. Restart glipizide 5mg twice daily before meals.     Follow-up: Return in about 3 months (around 7/29/2021) for Medication Therapy Management Pharmacist.      9/24/21 Addendum updated A1c:  Lab Results   Component Value Date    A1C 7.3 08/31/2021    A1C 12.0 04/28/2021    A1C 9.7 12/29/2020    A1C 9.2 09/09/2020    A1C 8.8 12/30/2019    A1C 10.1 10/30/2019       SUBJECTIVE/OBJECTIVE:                          Bigg Lilly is a 65 year old male coming in for a follow-up visit. He was referred to me from Dr. Owen.  Today's visit is a follow-up MTM visit from 12/30.     Reason for visit: For a month stopped taking his pills b/c tried Keto.    Allergies/ADRs: Reviewed in chart  Tobacco: He reports that he has never smoked. He has never used smokeless tobacco.  Alcohol: 4-6 beverages / week  Personal Healthcare Goals:  Ultimate goal is lose  enough weight and eat healthy enough and exercise daily to work his way off all his current RX medications! < 205lbs. is weight goal.     Medication Adherence/Access: Not taking any medications for past month     Type 2 Diabetes:  Off meds for month of April: Rybelsus 14mg daily, Jardiance 10mg daily, glipizide 5mg twice daily (doesn't think he was taking this for quite some time), metformin XR 1000mg twice daily, pioglitazone 30mg daily, and Jardiance 10mg daily. Patient is not experiencing side effects. Plans to restart now b/c high A1c.   Blood sugar monitoring: never.   Symptoms of low blood sugar? none  Symptoms of high blood sugar? none  Eye exam: up to date  Foot exam: up to date  Diet/Exercise: Scrambled eggs in the 7:30 AM, lunch varies depending on where he is, dinner chicken at 6pm stacey. No late snacking. Eats keto bread. Usually no pasta and rice but did have a lot of pasta and carbs at party recently.   Aspirin: Taking 81mg daily and denies side effects  Statin: No   ACEi/ARB: No.   Urine Albumin:   Lab Results   Component Value Date    UMALCR 8.05 12/29/2020      Lab Results   Component Value Date    A1C 12.0 04/28/2021    A1C 9.7 12/29/2020    A1C 9.2 09/09/2020    A1C 8.8 12/30/2019    A1C 10.1 10/30/2019     Wt Readings from Last 3 Encounters:   04/29/21 239 lb 4.8 oz (108.5 kg)   09/16/20 243 lb 5 oz (110.4 kg)   01/30/20 239 lb 6.4 oz (108.6 kg)       Hyperlipidemia: Current therapy includes no current medications. Prefers no statin medications. States his cholesterol has been good.   The 10-year ASCVD risk score (Westerlo TODD Jr., et al., 2013) is: 20.2%    Values used to calculate the score:      Age: 65 years      Sex: Male      Is Non- : No      Diabetic: Yes      Tobacco smoker: No      Systolic Blood Pressure: 118 mmHg      Is BP treated: No      HDL Cholesterol: 46 mg/dL      Total Cholesterol: 205 mg/dL  Recent Labs   Lab Test 09/09/20  0750 10/30/19  1240 11/02/15  0830  11/02/15  0830 06/12/15  0947   CHOL 219* 235*   < > 176 177   HDL 46 41   < > 40* 39*   * 158*   < > 113 116   TRIG 144 178*   < > 117 110   CHOLHDLRATIO  --   --   --  4.4 4.5    < > = values in this interval not displayed.       Supplements: Patient taking vitamin B12 daily and vitamin D 2000 units daily. No issues.     ED: Patient uses Viagra 50-100mg as needed and reports no issues.       Today's Vitals: /68   Wt 239 lb 4.8 oz (108.5 kg)   BMI 35.60 kg/m    ----------------    I spent 20 minutes with this patient today. All changes were made via collaborative practice agreement with Ramon Owen. A copy of the visit note was provided to the patient's primary care provider.    The patient was given a summary of these recommendations.     Ileana Ibarra, PharmD  Medication Therapy Management Provider, [facility]  Pager: [phone]        Medication Therapy Recommendations  Uncontrolled type 2 diabetes mellitus with stage 2 chronic kidney disease, without long-term current use of insulin (H)    Current Medication: metFORMIN (GLUCOPHAGE-XR) 500 MG 24 hr tablet   Rationale: Patient prefers not to take - Adherence - Adherence   Recommendation: Provide Adherence Intervention - Rybelsus 14 MG Tabs - Restart metformin 500 mg daily for 3-4 days then incresae to 500mg twice daily for 3-4 days and keep increasing every 3-4 days until 1000mg twice daily. Restart all other diabetes medications too.   Status: Accepted per CPA

## 2021-04-29 ENCOUNTER — OFFICE VISIT (OUTPATIENT)
Dept: PHARMACY | Facility: CLINIC | Age: 66
End: 2021-04-29
Payer: COMMERCIAL

## 2021-04-29 VITALS — SYSTOLIC BLOOD PRESSURE: 118 MMHG | BODY MASS INDEX: 35.6 KG/M2 | DIASTOLIC BLOOD PRESSURE: 68 MMHG | WEIGHT: 239.3 LBS

## 2021-04-29 DIAGNOSIS — E55.9 VITAMIN D DEFICIENCY: ICD-10-CM

## 2021-04-29 DIAGNOSIS — E78.5 HYPERLIPIDEMIA WITH TARGET LDL LESS THAN 100: ICD-10-CM

## 2021-04-29 DIAGNOSIS — N52.9 ERECTILE DYSFUNCTION, UNSPECIFIED ERECTILE DYSFUNCTION TYPE: ICD-10-CM

## 2021-04-29 PROCEDURE — 99607 MTMS BY PHARM ADDL 15 MIN: CPT | Performed by: PHARMACIST

## 2021-04-29 PROCEDURE — 99605 MTMS BY PHARM NP 15 MIN: CPT | Performed by: PHARMACIST

## 2021-04-29 RX ORDER — GLIPIZIDE 5 MG/1
5 TABLET ORAL
Qty: 180 TABLET | Refills: 1 | Status: SHIPPED | OUTPATIENT
Start: 2021-04-29 | End: 2021-09-21

## 2021-04-29 RX ORDER — ORAL SEMAGLUTIDE 7 MG/1
7 TABLET ORAL DAILY
Qty: 30 TABLET | Refills: 0 | Status: SHIPPED | OUTPATIENT
Start: 2021-04-29 | End: 2021-07-15

## 2021-04-29 NOTE — PATIENT INSTRUCTIONS
Recommendations from today's MTM visit:                                                       1. Restart metformin 500 mg daily for 3-4 days then incresae to 500mg twice daily for 3-4 days and keep increasing to 1000mg twice daily.  2. Restart Jardiance 10 mg daily and in 2-3 weeks increase Jardiance 25mg daily.  3. Restart pioglitazone 15mg daily for 2-3 weeks then increase to 30mg daily.  4. Restart Rybelsus 7 mg daily and in 1 month increase Rybelsus to 14 daily.   5. Restart glipizide 5mg twice daily before meals.     Follow-up: Return in about 3 months (around 7/29/2021) for Medication Therapy Management Pharmacist.    It was great to speak with you today.  I value your experience and would be very thankful for your time with providing feedback on our clinic survey. You may receive a survey via email or text message in the next few days.     To schedule another MTM appointment, please call the clinic directly or you may call the MTM scheduling line at [phone] or toll-free at [phone].     My Clinical Pharmacist's contact information:                                                      Please feel free to contact me with any questions or concerns you have.      Ileana Ibarra, PharmD  Medication Therapy Management Provider, [facility]

## 2021-04-29 NOTE — Clinical Note
FYI - he has not been taking any of his 5 diabetes medications. Made a plan today to restart everything.

## 2021-05-14 ENCOUNTER — OFFICE VISIT (OUTPATIENT)
Dept: FAMILY MEDICINE | Facility: CLINIC | Age: 66
End: 2021-05-14
Payer: COMMERCIAL

## 2021-05-14 VITALS
WEIGHT: 239 LBS | SYSTOLIC BLOOD PRESSURE: 116 MMHG | DIASTOLIC BLOOD PRESSURE: 64 MMHG | RESPIRATION RATE: 16 BRPM | HEIGHT: 69 IN | TEMPERATURE: 98.7 F | OXYGEN SATURATION: 95 % | HEART RATE: 75 BPM | BODY MASS INDEX: 35.4 KG/M2

## 2021-05-14 DIAGNOSIS — K64.8 INTERNAL HEMORRHOID: Primary | ICD-10-CM

## 2021-05-14 DIAGNOSIS — R23.8 SKIN IRRITATION: ICD-10-CM

## 2021-05-14 PROCEDURE — 99213 OFFICE O/P EST LOW 20 MIN: CPT | Performed by: FAMILY MEDICINE

## 2021-05-14 ASSESSMENT — MIFFLIN-ST. JEOR: SCORE: 1860.51

## 2021-05-14 NOTE — PROGRESS NOTES
"    Assessment & Plan     Internal hemorrhoid  Discussed conservative management for hemorrhoids with increase in fiber and fluids in diet, stool softener with taking MiraLAX daily if constipation. Discussed not straining to stool and need for soft stools every day to avoid rectal pressure.  Consider colorectal for banding if worsening issue.  Follow-up with primary care if not improving.    Skin irritation  Discussed using topical barrier cream.                BMI:   Estimated body mass index is 35.55 kg/m  as calculated from the following:    Height as of this encounter: 1.746 m (5' 8.75\").    Weight as of this encounter: 108.4 kg (239 lb).           No follow-ups on file.    Aj Schultz MD  [facility] FIFI Ovalles is a 65 year old who presents for the following health issues     HPI     Hemorrhoids and possible  lump  Onset/Duration: x 2  months  Description:   Mackenzie-anal lump: YES  Pain: YES  Itching: YES  Accompanying Signs & Symptoms:  Blood in stool: no  Changes in stool pattern: no  History:   Any previous GI studies done:none  Family History of colon cancer: no  Precipitating factors:   None  Alleviating factors:  None  Therapies tried and outcome: preparation H    Review of Systems         Objective    /64 (BP Location: Right arm, Patient Position: Chair, Cuff Size: Adult Large)   Pulse 75   Temp 98.7  F (37.1  C) (Oral)   Resp 16   Ht 1.746 m (5' 8.75\")   Wt 108.4 kg (239 lb)   SpO2 95%   BMI 35.55 kg/m    Body mass index is 35.55 kg/m .  Physical Exam   GENERAL: healthy, alert and no distress  RECTAL (male): Normal appearance, small internal hemorrhoid noted without bleeding, slight irritation of the gluteal cleft of skin                "

## 2021-05-14 NOTE — PATIENT INSTRUCTIONS
Patient Education     Hemorrhoids    Hemorrhoids are swollen and inflamed veins inside the rectum and near the anus. The rectum is the last several inches of the colon. The anus is the passage between the rectum and the outside of the body.   Causes  The veins can become swollen due to increased pressure in them. This is most often caused by:     Chronic constipation or diarrhea    Straining when having a bowel movement    Sitting too long on the toilet    A low-fiber diet    Pregnancy  Symptoms    Bleeding from the rectum. You may notice this after bowel movements.    Lump near the anus    Itching around the anus    Pain around the anus    Mucus leaks from the anus  There are different types of hemorrhoids. Depending on the type you have and the severity, you may be able to treat yourself at home. In some cases, a procedure may be the best treatment option. Your healthcare provider can tell you more about this, if needed.   Home care  General care    To get relief from pain or itching, try:  ? Medicines. Your healthcare provider may recommend stool softeners, suppositories, or laxatives to help manage constipation. Use these exactly as directed.  ? Sitz baths. A sitz bath involves sitting in a few inches of warm bath water. Be careful not to make the water so hot that you burn yourself--test it before sitting in it. Soak for about 10 to 15 minutes a few times a day. This may help relieve pain.  ? Topical products. Your healthcare provider may prescribe or recommend creams, ointments, or pads that can be applied to the hemorrhoid. Use these exactly as directed.  Tips to help prevent hemorrhoids     Eat more fiber. Fiber adds bulk to stool and absorbs water as it moves through your colon. This makes stool softer and easier to pass.  ? Increase the fiber in your diet with more fiber-rich foods. These include fresh fruit, vegetables, and whole grains.  ? Take a fiber supplement or bulking agent, if advised by your  healthcare provider. These include products such as psyllium or methylcellulose.    Drink more water. Your healthcare provider may direct you to drink plenty of water. This can help keep stool soft.    Be more active. Frequent exercise aids digestion and helps prevent constipation. It may also help make bowel movements more regular.    Don t strain during bowel movements. This can make hemorrhoids more likely. Also, don t sit on the toilet for long periods of time.    Follow-up care  Follow up with your healthcare provider as advised. If a culture or imaging tests were done, someone will let you know the results when they are ready. This may take a few days or longer. If your healthcare provider recommends a procedure for your hemorrhoids, these options can be discussed. Options may include surgery and outpatient office treatments.   When to seek medical advice  Call your healthcare provider right away if any of these occur:     Increased bleeding from the rectum    Increased pain around the rectum or anus    Weakness or dizziness  Call 911  Call 911 if any of these occur:     Trouble breathing or swallowing    Fainting or loss of consciousness    Unusually fast heart rate    Vomiting blood    Large amounts of blood in stool or black, tarry stools  Yuli last reviewed this educational content on 8/1/2019 2000-2021 [employer]. All rights reserved. This information is not intended as a substitute for professional medical care. Always follow your healthcare professional's instructions.         Calmoseptine for irritation in crease

## 2021-07-14 ENCOUNTER — OFFICE VISIT (OUTPATIENT)
Dept: FAMILY MEDICINE | Facility: CLINIC | Age: 66
End: 2021-07-14
Payer: COMMERCIAL

## 2021-07-14 VITALS
HEIGHT: 68 IN | TEMPERATURE: 97.8 F | SYSTOLIC BLOOD PRESSURE: 134 MMHG | HEART RATE: 73 BPM | BODY MASS INDEX: 36.48 KG/M2 | OXYGEN SATURATION: 100 % | WEIGHT: 240.7 LBS | DIASTOLIC BLOOD PRESSURE: 79 MMHG

## 2021-07-14 DIAGNOSIS — E66.01 MORBID OBESITY (H): ICD-10-CM

## 2021-07-14 DIAGNOSIS — H69.91 DYSFUNCTION OF RIGHT EUSTACHIAN TUBE: Primary | ICD-10-CM

## 2021-07-14 PROCEDURE — 99214 OFFICE O/P EST MOD 30 MIN: CPT | Performed by: FAMILY MEDICINE

## 2021-07-14 RX ORDER — FLUTICASONE PROPIONATE 50 MCG
1 SPRAY, SUSPENSION (ML) NASAL DAILY
Qty: 9.9 ML | Refills: 0 | Status: SHIPPED | OUTPATIENT
Start: 2021-07-14 | End: 2021-09-21

## 2021-07-14 ASSESSMENT — MIFFLIN-ST. JEOR: SCORE: 1856.31

## 2021-07-14 NOTE — PROGRESS NOTES
"    Assessment & Plan   Problem List Items Addressed This Visit     Dysfunction of right eustachian tube - Primary     Decades of recurrent episodes of \"ear infections\".  current episode left-sided decreased hearing sense of fullness 1 month.  Treated with OTC drops for wax.  Exam shows no fluid erythema tenderness or distortion.  Treat as eustachian tube dysfunction, refer to otolaryngology unless symptoms resolve completely, in which case he may cancel         Relevant Medications    fluticasone (FLONASE) 50 MCG/ACT nasal spray    Other Relevant Orders    Otolaryngology Referral    Morbid obesity (H)     He has maintained his weight through the pandemic         Uncontrolled type 2 diabetes mellitus with stage 2 chronic kidney disease, without long-term current use of insulin (H)     Hemoglobin A1C   Date Value Ref Range Status   04/28/2021 12.0 (H) 0 - 5.6 % Final     Comment:     Results confirmed by repeat test  Normal <5.7% Prediabetes 5.7-6.4%  Diabetes 6.5% or higher - adopted from ADA   consensus guidelines.     This measure reflects attempted diet controlled.  He has resumed medical therapies.  Reassess at next visit                         BMI:   Estimated body mass index is 36.6 kg/m  as calculated from the following:    Height as of this encounter: 1.727 m (5' 8\").    Weight as of this encounter: 109.2 kg (240 lb 11.2 oz).   Weight management plan: Positive strokes for weight maintenance        Return in about 2 months (around 9/23/2021) for Physical Exam.    Ramon Owen MD  [facility]    Shefali Ovalles is a 65 year old who presents for the following health issues     HPI     Acute Illness  Acute illness concerns: Ear ache on right ear and having difficulty hearing.  Onset/Duration: started a month   Symptoms:  Fever: no  Chills/Sweats: no  Headache (location?): no  Sinus Pressure: no  Conjunctivitis:  no  Ear Pain: YES: right  Therapies tried and outcome: ear drops with " "no relief       Review of Systems   No fever no coryza no drainage no cough      Objective    /79 (BP Location: Right arm, Patient Position: Sitting, Cuff Size: Adult Large)   Pulse 73   Temp 97.8  F (36.6  C) (Oral)   Ht 1.727 m (5' 8\")   Wt 109.2 kg (240 lb 11.2 oz)   SpO2 100%   BMI 36.60 kg/m    Body mass index is 36.6 kg/m .  Physical Exam   Ears without wax erythema discomfort distortion middle ear fluid.  Some TM scarring        Ramon Owen MD                "

## 2021-07-15 PROBLEM — H69.91 DYSFUNCTION OF RIGHT EUSTACHIAN TUBE: Status: ACTIVE | Noted: 2021-07-15

## 2021-07-15 NOTE — ASSESSMENT & PLAN NOTE
"Decades of recurrent episodes of \"ear infections\".  current episode left-sided decreased hearing sense of fullness 1 month.  Treated with OTC drops for wax.  Exam shows no fluid erythema tenderness or distortion.  Treat as eustachian tube dysfunction, refer to otolaryngology unless symptoms resolve completely, in which case he may cancel  "

## 2021-07-15 NOTE — ASSESSMENT & PLAN NOTE
Hemoglobin A1C   Date Value Ref Range Status   04/28/2021 12.0 (H) 0 - 5.6 % Final     Comment:     Results confirmed by repeat test  Normal <5.7% Prediabetes 5.7-6.4%  Diabetes 6.5% or higher - adopted from ADA   consensus guidelines.     This measure reflects attempted diet controlled.  He has resumed medical therapies.  Reassess at next visit

## 2021-08-09 ENCOUNTER — TRANSFERRED RECORDS (OUTPATIENT)
Dept: HEALTH INFORMATION MANAGEMENT | Facility: CLINIC | Age: 66
End: 2021-08-09

## 2021-08-31 ENCOUNTER — LAB (OUTPATIENT)
Dept: LAB | Facility: CLINIC | Age: 66
End: 2021-08-31
Payer: COMMERCIAL

## 2021-08-31 DIAGNOSIS — E78.5 HYPERLIPIDEMIA WITH TARGET LDL LESS THAN 100: Primary | ICD-10-CM

## 2021-08-31 DIAGNOSIS — E78.5 HYPERLIPIDEMIA WITH TARGET LDL LESS THAN 100: ICD-10-CM

## 2021-08-31 LAB
ANION GAP SERPL CALCULATED.3IONS-SCNC: 6 MMOL/L (ref 3–14)
BUN SERPL-MCNC: 13 MG/DL (ref 7–30)
CALCIUM SERPL-MCNC: 9.2 MG/DL (ref 8.5–10.1)
CHLORIDE BLD-SCNC: 107 MMOL/L (ref 94–109)
CHOLEST SERPL-MCNC: 246 MG/DL
CO2 SERPL-SCNC: 26 MMOL/L (ref 20–32)
CREAT SERPL-MCNC: 1.06 MG/DL (ref 0.66–1.25)
FASTING STATUS PATIENT QL REPORTED: YES
GFR SERPL CREATININE-BSD FRML MDRD: 73 ML/MIN/1.73M2
GLUCOSE BLD-MCNC: 125 MG/DL (ref 70–99)
HBA1C MFR BLD: 7.3 % (ref 0–5.6)
HDLC SERPL-MCNC: 55 MG/DL
LDLC SERPL CALC-MCNC: 158 MG/DL
NONHDLC SERPL-MCNC: 191 MG/DL
POTASSIUM BLD-SCNC: 4.7 MMOL/L (ref 3.4–5.3)
SODIUM SERPL-SCNC: 139 MMOL/L (ref 133–144)
TRIGL SERPL-MCNC: 163 MG/DL

## 2021-08-31 PROCEDURE — 80061 LIPID PANEL: CPT

## 2021-08-31 PROCEDURE — 36415 COLL VENOUS BLD VENIPUNCTURE: CPT

## 2021-08-31 PROCEDURE — 80048 BASIC METABOLIC PNL TOTAL CA: CPT

## 2021-08-31 PROCEDURE — 83036 HEMOGLOBIN GLYCOSYLATED A1C: CPT

## 2021-08-31 RX ORDER — ROSUVASTATIN CALCIUM 40 MG/1
40 TABLET, COATED ORAL DAILY
Qty: 90 TABLET | Refills: 3 | Status: SHIPPED | OUTPATIENT
Start: 2021-08-31 | End: 2021-09-21

## 2021-08-31 NOTE — RESULT ENCOUNTER NOTE
Your cholesterol is climbing, which it does.  The Rybelsus is good for your heart.  However, I calculate your risk of having a heart attack is 1 in 3.  I would recommend putting you on a medication, statin, to reduce your heart attack and stroke risk.  I have taken the liberty of sending this the pharmacy  Ramon Owen MD

## 2021-09-05 ENCOUNTER — HEALTH MAINTENANCE LETTER (OUTPATIENT)
Age: 66
End: 2021-09-05

## 2021-09-09 ENCOUNTER — TRANSFERRED RECORDS (OUTPATIENT)
Dept: MULTI SPECIALTY CLINIC | Facility: CLINIC | Age: 66
End: 2021-09-09
Payer: COMMERCIAL

## 2021-09-09 LAB — RETINOPATHY: NORMAL

## 2021-09-21 ENCOUNTER — OFFICE VISIT (OUTPATIENT)
Dept: FAMILY MEDICINE | Facility: CLINIC | Age: 66
End: 2021-09-21
Payer: COMMERCIAL

## 2021-09-21 VITALS
HEIGHT: 68 IN | WEIGHT: 238 LBS | HEART RATE: 74 BPM | OXYGEN SATURATION: 97 % | BODY MASS INDEX: 36.07 KG/M2 | TEMPERATURE: 98.3 F | SYSTOLIC BLOOD PRESSURE: 130 MMHG | DIASTOLIC BLOOD PRESSURE: 76 MMHG

## 2021-09-21 DIAGNOSIS — Z00.00 ENCOUNTER FOR MEDICARE ANNUAL WELLNESS EXAM: Primary | ICD-10-CM

## 2021-09-21 DIAGNOSIS — E11.22 TYPE 2 DIABETES MELLITUS WITH STAGE 1 CHRONIC KIDNEY DISEASE, WITHOUT LONG-TERM CURRENT USE OF INSULIN (H): ICD-10-CM

## 2021-09-21 DIAGNOSIS — E11.22 CKD STAGE 1 DUE TO TYPE 2 DIABETES MELLITUS (H): ICD-10-CM

## 2021-09-21 DIAGNOSIS — I10 HYPERTENSION GOAL BP (BLOOD PRESSURE) < 140/90: ICD-10-CM

## 2021-09-21 DIAGNOSIS — E78.5 HYPERLIPIDEMIA WITH TARGET LDL LESS THAN 100: ICD-10-CM

## 2021-09-21 DIAGNOSIS — Z23 FLU VACCINE NEED: ICD-10-CM

## 2021-09-21 DIAGNOSIS — N18.1 TYPE 2 DIABETES MELLITUS WITH STAGE 1 CHRONIC KIDNEY DISEASE, WITHOUT LONG-TERM CURRENT USE OF INSULIN (H): ICD-10-CM

## 2021-09-21 DIAGNOSIS — E66.01 MORBID OBESITY (H): ICD-10-CM

## 2021-09-21 DIAGNOSIS — N18.1 CKD STAGE 1 DUE TO TYPE 2 DIABETES MELLITUS (H): ICD-10-CM

## 2021-09-21 DIAGNOSIS — Z23 PNEUMOCOCCAL VACCINATION ADMINISTERED AT CURRENT VISIT: ICD-10-CM

## 2021-09-21 DIAGNOSIS — Z28.21 COVID-19 VIRUS VACCINATION DECLINED: ICD-10-CM

## 2021-09-21 PROBLEM — H69.91 DYSFUNCTION OF RIGHT EUSTACHIAN TUBE: Status: RESOLVED | Noted: 2021-07-15 | Resolved: 2021-09-21

## 2021-09-21 PROCEDURE — 99213 OFFICE O/P EST LOW 20 MIN: CPT | Mod: 25 | Performed by: FAMILY MEDICINE

## 2021-09-21 PROCEDURE — 90732 PPSV23 VACC 2 YRS+ SUBQ/IM: CPT | Performed by: FAMILY MEDICINE

## 2021-09-21 PROCEDURE — 99207 PR FOOT EXAM NO CHARGE: CPT | Mod: 25 | Performed by: FAMILY MEDICINE

## 2021-09-21 PROCEDURE — 90662 IIV NO PRSV INCREASED AG IM: CPT | Performed by: FAMILY MEDICINE

## 2021-09-21 PROCEDURE — 99397 PER PM REEVAL EST PAT 65+ YR: CPT | Mod: 25 | Performed by: FAMILY MEDICINE

## 2021-09-21 PROCEDURE — G0009 ADMIN PNEUMOCOCCAL VACCINE: HCPCS | Performed by: FAMILY MEDICINE

## 2021-09-21 PROCEDURE — G0008 ADMIN INFLUENZA VIRUS VAC: HCPCS | Performed by: FAMILY MEDICINE

## 2021-09-21 RX ORDER — ORAL SEMAGLUTIDE 14 MG/1
14 TABLET ORAL DAILY
Qty: 90 TABLET | Refills: 1 | Status: SHIPPED | OUTPATIENT
Start: 2021-09-21 | End: 2022-04-01

## 2021-09-21 RX ORDER — GLIPIZIDE 5 MG/1
5 TABLET ORAL
Qty: 180 TABLET | Refills: 1 | Status: SHIPPED | OUTPATIENT
Start: 2021-09-21 | End: 2021-12-23

## 2021-09-21 RX ORDER — METFORMIN HCL 500 MG
1000 TABLET, EXTENDED RELEASE 24 HR ORAL 2 TIMES DAILY WITH MEALS
Qty: 360 TABLET | Refills: 1 | Status: SHIPPED | OUTPATIENT
Start: 2021-09-21 | End: 2021-12-23

## 2021-09-21 RX ORDER — PIOGLITAZONEHYDROCHLORIDE 30 MG/1
30 TABLET ORAL DAILY
Qty: 90 TABLET | Refills: 1 | Status: SHIPPED | OUTPATIENT
Start: 2021-09-21 | End: 2021-12-23

## 2021-09-21 ASSESSMENT — ENCOUNTER SYMPTOMS
HEMATOCHEZIA: 0
FREQUENCY: 0
SHORTNESS OF BREATH: 0
PARESTHESIAS: 0
DIZZINESS: 0
HEMATURIA: 0
HEADACHES: 0
HEARTBURN: 0
ARTHRALGIAS: 0
DIARRHEA: 0
DYSURIA: 0
JOINT SWELLING: 0
FEVER: 0
ABDOMINAL PAIN: 0
SORE THROAT: 0
MYALGIAS: 0
CHILLS: 0
NERVOUS/ANXIOUS: 0
NAUSEA: 0
CONSTIPATION: 0
COUGH: 0
PALPITATIONS: 0
EYE PAIN: 0
WEAKNESS: 0

## 2021-09-21 ASSESSMENT — ACTIVITIES OF DAILY LIVING (ADL): CURRENT_FUNCTION: NO ASSISTANCE NEEDED

## 2021-09-21 ASSESSMENT — MIFFLIN-ST. JEOR: SCORE: 1839.06

## 2021-09-21 NOTE — PROGRESS NOTES
"The 10-year ASCVD risk score (Tete BROOKS Jr., et al., 2013) is: 25.7%    Values used to calculate the score:      Age: 66 years      Sex: Male      Is Non- : No      Diabetic: Yes      Tobacco smoker: No      Systolic Blood Pressure: 130 mmHg      Is BP treated: No      HDL Cholesterol: 55 mg/dL      Total Cholesterol: 246 mg/dL  Ramon Owen MD    Answers for HPI/ROS submitted by the patient on 9/21/2021  In general, how would you rate your overall physical health?: good  Frequency of exercise:: 1 day/week  Do you usually eat at least 4 servings of fruit and vegetables a day, include whole grains & fiber, and avoid regularly eating high fat or \"junk\" foods? : No  Taking medications regularly:: No  Medication side effects:: None  Activities of Daily Living: no assistance needed  Home safety: no safety concerns identified  Hearing Impairment:: no hearing concerns  In the past 6 months, have you been bothered by leaking of urine?: No  abdominal pain: No  Blood in stool: No  Blood in urine: No  chest pain: No  chills: No  congestion: No  constipation: No  cough: No  diarrhea: No  dizziness: No  ear pain: No  eye pain: No  nervous/anxious: No  fever: No  frequency: No  genital sores: No  headaches: No  hearing loss: No  heartburn: No  arthralgias: No  joint swelling: No  peripheral edema: No  mood changes: No  myalgias: No  nausea: No  dysuria: No  palpitations: No  Skin sensation changes: No  sore throat: No  urgency: No  rash: No  shortness of breath: No  visual disturbance: No  weakness: No  impotence: No  penile discharge: No  In general, how would you rate your overall mental or emotional health?: excellent  Additional concerns today:: No  Duration of exercise:: 30-45 minutes      "

## 2021-09-21 NOTE — PATIENT INSTRUCTIONS
Patient Education   Personalized Prevention Plan  You are due for the preventive services outlined below.  Your care team is available to assist you in scheduling these services.  If you have already completed any of these items, please share that information with your care team to update in your medical record.  Health Maintenance Due   Topic Date Due     COVID-19 Vaccine (1) Never done     Discuss Advance Care Planning  10/17/2016     FALL RISK ASSESSMENT  07/16/2021     AORTIC ANEURYSM SCREENING (SYSTEM ASSIGNED)  Never done     Flu Vaccine (1) 09/01/2021     Diabetic Foot Exam  09/16/2021     Annual Wellness Visit  09/16/2021     Eye Exam  10/01/2021

## 2021-09-22 NOTE — ASSESSMENT & PLAN NOTE
GFR Estimate   Date Value Ref Range Status   08/31/2021 73 >60 mL/min/1.73m2 Final     Comment:     As of July 11, 2021, eGFR is calculated by the CKD-EPI creatinine equation, without race adjustment. eGFR can be influenced by muscle mass, exercise, and diet. The reported eGFR is an estimation only and is only applicable if the renal function is stable.   04/28/2021 >90 >60 mL/min/[1.73_m2] Final     Comment:     Non  GFR Calc  Starting 12/18/2018, serum creatinine based estimated GFR (eGFR) will be   calculated using the Chronic Kidney Disease Epidemiology Collaboration   (CKD-EPI) equation.       Microalbuminuria now 4 years ago.  Not on ACE/ARB.  No change

## 2021-09-22 NOTE — ASSESSMENT & PLAN NOTE
He has never taken his statin.  He considers his cholesterol low.  Discussed ED visit for acute MI should occur, as well as his calculated risk.

## 2021-09-22 NOTE — ASSESSMENT & PLAN NOTE
Hemoglobin A1C   Date Value Ref Range Status   08/31/2021 7.3 (H) 0.0 - 5.6 % Final     Comment:     Normal <5.7%   Prediabetes 5.7-6.4%    Diabetes 6.5% or higher     Note: Adopted from ADA consensus guidelines.   04/28/2021 12.0 (H) 0 - 5.6 % Final     Comment:     Results confirmed by repeat test  Normal <5.7% Prediabetes 5.7-6.4%  Diabetes 6.5% or higher - adopted from ADA   consensus guidelines.     Now adequately controlled.  Discussed, continue.

## 2021-12-26 ENCOUNTER — HEALTH MAINTENANCE LETTER (OUTPATIENT)
Age: 66
End: 2021-12-26

## 2022-02-17 PROBLEM — Z28.21 SEVERE ACUTE RESPIRATORY SYNDROME CORONAVIRUS 2 (SARS-COV-2) VACCINATION DECLINED: Status: ACTIVE | Noted: 2021-09-21

## 2022-03-23 ENCOUNTER — TELEPHONE (OUTPATIENT)
Dept: FAMILY MEDICINE | Facility: CLINIC | Age: 67
End: 2022-03-23
Payer: COMMERCIAL

## 2022-03-23 NOTE — TELEPHONE ENCOUNTER
Patient Quality Outreach    Patient is due for the following:   Diabetes -  Eye Exam    NEXT STEPS:   Chart routed to abstraction.    9/9/21 [facility] normal results     Type of outreach:    Chart review performed, no outreach needed.      Questions for provider review:    None     Tara Infante MA

## 2022-03-30 ENCOUNTER — LAB (OUTPATIENT)
Dept: LAB | Facility: CLINIC | Age: 67
End: 2022-03-30
Payer: COMMERCIAL

## 2022-03-30 DIAGNOSIS — I10 HYPERTENSION GOAL BP (BLOOD PRESSURE) < 140/90: Primary | ICD-10-CM

## 2022-03-30 LAB — HBA1C MFR BLD: 9.2 % (ref 0–5.6)

## 2022-03-30 PROCEDURE — 83036 HEMOGLOBIN GLYCOSYLATED A1C: CPT

## 2022-03-30 PROCEDURE — 82043 UR ALBUMIN QUANTITATIVE: CPT

## 2022-03-30 PROCEDURE — 80061 LIPID PANEL: CPT

## 2022-03-30 PROCEDURE — 80053 COMPREHEN METABOLIC PANEL: CPT

## 2022-03-30 PROCEDURE — 36415 COLL VENOUS BLD VENIPUNCTURE: CPT

## 2022-03-31 LAB
ALBUMIN SERPL-MCNC: 3.9 G/DL (ref 3.4–5)
ALP SERPL-CCNC: 56 U/L (ref 40–150)
ALT SERPL W P-5'-P-CCNC: 24 U/L (ref 0–70)
ANION GAP SERPL CALCULATED.3IONS-SCNC: 6 MMOL/L (ref 3–14)
AST SERPL W P-5'-P-CCNC: 17 U/L (ref 0–45)
BILIRUB SERPL-MCNC: 0.7 MG/DL (ref 0.2–1.3)
BUN SERPL-MCNC: 14 MG/DL (ref 7–30)
CALCIUM SERPL-MCNC: 9.4 MG/DL (ref 8.5–10.1)
CHLORIDE BLD-SCNC: 110 MMOL/L (ref 94–109)
CHOLEST SERPL-MCNC: 201 MG/DL
CO2 SERPL-SCNC: 27 MMOL/L (ref 20–32)
CREAT SERPL-MCNC: 0.98 MG/DL (ref 0.66–1.25)
CREAT UR-MCNC: 171 MG/DL
FASTING STATUS PATIENT QL REPORTED: YES
GFR SERPL CREATININE-BSD FRML MDRD: 86 ML/MIN/1.73M2
GLUCOSE BLD-MCNC: 125 MG/DL (ref 70–99)
HDLC SERPL-MCNC: 44 MG/DL
LDLC SERPL CALC-MCNC: 140 MG/DL
MICROALBUMIN UR-MCNC: 31 MG/L
MICROALBUMIN/CREAT UR: 18.13 MG/G CR (ref 0–17)
NONHDLC SERPL-MCNC: 157 MG/DL
POTASSIUM BLD-SCNC: 4.8 MMOL/L (ref 3.4–5.3)
PROT SERPL-MCNC: 7.3 G/DL (ref 6.8–8.8)
SODIUM SERPL-SCNC: 143 MMOL/L (ref 133–144)
TRIGL SERPL-MCNC: 86 MG/DL

## 2022-04-01 ENCOUNTER — VIRTUAL VISIT (OUTPATIENT)
Dept: PHARMACY | Facility: CLINIC | Age: 67
End: 2022-04-01
Payer: COMMERCIAL

## 2022-04-01 DIAGNOSIS — N52.9 ERECTILE DYSFUNCTION, UNSPECIFIED ERECTILE DYSFUNCTION TYPE: ICD-10-CM

## 2022-04-01 DIAGNOSIS — E55.9 VITAMIN D DEFICIENCY: ICD-10-CM

## 2022-04-01 DIAGNOSIS — E78.5 HYPERLIPIDEMIA WITH TARGET LDL LESS THAN 100: ICD-10-CM

## 2022-04-01 DIAGNOSIS — E53.8 VITAMIN B12 DEFICIENCY (NON ANEMIC): ICD-10-CM

## 2022-04-01 DIAGNOSIS — N18.1 TYPE 2 DIABETES MELLITUS WITH STAGE 1 CHRONIC KIDNEY DISEASE, WITHOUT LONG-TERM CURRENT USE OF INSULIN (H): ICD-10-CM

## 2022-04-01 DIAGNOSIS — E11.22 TYPE 2 DIABETES MELLITUS WITH STAGE 1 CHRONIC KIDNEY DISEASE, WITHOUT LONG-TERM CURRENT USE OF INSULIN (H): ICD-10-CM

## 2022-04-01 PROCEDURE — 99605 MTMS BY PHARM NP 15 MIN: CPT | Performed by: PHARMACIST

## 2022-04-01 PROCEDURE — 99607 MTMS BY PHARM ADDL 15 MIN: CPT | Performed by: PHARMACIST

## 2022-04-01 RX ORDER — METFORMIN HCL 500 MG
1000 TABLET, EXTENDED RELEASE 24 HR ORAL 2 TIMES DAILY WITH MEALS
Qty: 360 TABLET | Refills: 2 | Status: SHIPPED | OUTPATIENT
Start: 2022-04-01 | End: 2023-01-25

## 2022-04-01 RX ORDER — GLIPIZIDE 5 MG/1
5 TABLET ORAL
Qty: 180 TABLET | Refills: 2 | Status: SHIPPED | OUTPATIENT
Start: 2022-04-01 | End: 2023-01-25

## 2022-04-01 RX ORDER — PIOGLITAZONEHYDROCHLORIDE 30 MG/1
30 TABLET ORAL DAILY
Qty: 90 TABLET | Refills: 2 | Status: SHIPPED | OUTPATIENT
Start: 2022-04-01 | End: 2023-01-25

## 2022-04-01 NOTE — LETTER
_  Medication List        Prepared on: 4/1/2022     Bring your Medication List when you go to the doctor, hospital, or   emergency room. And, share it with your family or caregivers.     Note any changes to how you take your medications.  Cross out medications when you no longer use them.    Medication How I take it Why I use it Prescriber   aspirin 81 MG tablet Take 81 mg by mouth daily Uncontrolled type 2 diabetes mellitus with stage 2 chronic kidney disease, without long-term current use of insulin (H) Ramon Owen MD   Cholecalciferol (VITAMIN D3) 50 MCG (2000 UT) TABS Take 2,000 Units by mouth daily [facility] Ramon Owen MD   cyanocolbalamin (VITAMIN  B-12) 500 MCG tablet Take 500 mcg by mouth daily [facility] Patient Reported   glipiZIDE (GLUCOTROL) 5 MG tablet Take 1 tablet (5 mg) by mouth 2 times daily (before meals) Type 2 diabetes mellitus with stage 1 chronic kidney disease, without long-term current use of insulin (H) Ramon Owen MD   metFORMIN (GLUCOPHAGE-XR) 500 MG 24 hr tablet Take 2 tablets (1,000 mg) by mouth 2 times daily (with meals) Type 2 diabetes mellitus with stage 1 chronic kidney disease, without long-term current use of insulin (H) Ramon Owen MD   pioglitazone (ACTOS) 30 MG tablet Take 1 tablet (30 mg) by mouth daily Type 2 diabetes mellitus with stage 1 chronic kidney disease, without long-term current use of insulin (H) Ramon Owen MD   sildenafil (VIAGRA) 50 MG tablet Take 1-2 tablets ( mg) by mouth daily as needed (1 hour prior to sexual activity) Erectile dysfunction, unspecified erectile dysfunction type Ramon Owen MD         Add new medications, over-the-counter drugs, herbals, vitamins, or  minerals in the blank rows below.    Medication How I take it Why I use it Prescriber                          Allergies:      no known allergies        Side effects I have had:              Other Information:             My notes and questions:

## 2022-04-01 NOTE — LETTER
"Recommended To-Do List      Prepared on: 4/1/2022     You can get the best results from your medications by completing the items on this \"To-Do List.\"      Bring your To-Do List when you go to your doctor. And, share it with your family or caregivers.    My To-Do List:  What we talked about: What I should do:   What my medicines are for, how to know if my medicines are working, made sure my medicines are safe for me and reviewed how to take my medicines.   Take my medicines every day                "

## 2022-04-01 NOTE — PATIENT INSTRUCTIONS
Recommendations from today's MTM visit:                                                       1. No medication changes. Continue improved diet and increase exercise.   2. Blood glucose goals: fasting in the morning should be  mg/dL and 2-3 hours after a meal should be less than 180 mg/dL    Follow-up: Return in about 3 months (around 7/1/2022) for Lab Work.    It was great to speak with you today.  I value your experience and would be very thankful for your time with providing feedback on our clinic survey. You may receive a survey via email or text message in the next few days.     To schedule another MTM appointment, please call the clinic directly or you may call the MTM scheduling line at [phone] or toll-free at [phone].     My Clinical Pharmacist's contact information:                                                      Please feel free to contact me with any questions or concerns you have.      Ileana Ibarra, PharmD  Medication Therapy Management Provider, [facility]

## 2022-04-01 NOTE — LETTER
April 1, 2022  Bigg Lilly  [address] [facility] [address]    Dear DAVIN Zambrano [facility]        Thank you for talking with me on Apr 1, 2022 about your health and medications. As a follow-up to our conversation, I have included two documents:      1. Your Recommended To-Do List has steps you should take to get the best results from your medications.  2. Your Medication List will help you keep track of your medications and how to take them.    If you want to talk about these documents, please call Ileana Ibarra RPH at phone: [phone], Monday-Friday 8-4:30pm.    I look forward to working with you and your doctors to make sure your medications work well for you.    Sincerely,    Ileana Ibarra RPH  [facility] Pharmacist, [facility]

## 2022-04-01 NOTE — PROGRESS NOTES
Medication Therapy Management (MTM) Encounter    ASSESSMENT:                            Medication Adherence/Access: No issues identified now    Type 2 Diabetes: Patient is not meeting A1c goal of < 8% but recent self monitoring of blood glucose is at goal of fasting  mg/dL. Will make no changes today, encouraged continued lifestyle changes and adherence to medications every day. Recheck A1c in 3 months.     Hyperlipidemia: Patient is not on moderate intensity statin which is indicated based on 2019 ACC/AHA guidelines for lipid management.  He declines statin therapy at this time.     Supplements: stable  ED: stable    PLAN:                            1. No medication changes. Continue improved diet and increase exercise.     Follow-up: Return in about 3 months (around 7/1/2022) for Lab Work.    SUBJECTIVE/OBJECTIVE:                          Bigg Lilly is a 66 year old male called for a follow-up visit.  Today's visit is a follow-up MTM visit from 4/29/21.     Reason for visit: Elevated A1c.    Tobacco: He reports that he has never smoked. He has never used smokeless tobacco.  Alcohol: 4-6 beverages / week  Personal Healthcare Goals:  Ultimate goal is lose enough weight and eat healthy enough and exercise daily to work his way off all his current RX medications! < 205lbs. is weight goal.     Medication Adherence/Access: Didn't have any medication in Jan and Feb due to traveling, back on medications starting in March    Type 2 Diabetes:  Patient taking glipizide 5mg twice daily, metformin XR 1000mg twice daily, pioglitazone 30mg daily. Jardiance and Rybelsus too expensive. Patient is not experiencing side effects.  Blood sugar monitoring: occasional some days normally but 1x daily now  Ranges (Patient reported): fasting 100-120's now that he's back on his medications  Symptoms of low blood sugar? none  Symptoms of high blood sugar? none  Eye exam: up to date  Foot exam: up to date  Diet/Exercise: Diet  improved this past month now. Was on vacation Jan/Feb. Scrambled eggs in the 7:30 AM, lunch varies depending on where he is, dinner chicken at 6pm stacey. No late snacking. Eats keto bread. Usually no pasta and rice.  Aspirin: Taking 81mg daily and denies side effects  Statin: No   ACEi/ARB: No.   Urine Albumin:   Lab Results   Component Value Date    UMALCR 18.13 (H) 03/30/2022     Lab Results   Component Value Date    A1C 9.2 03/30/2022    A1C 7.3 08/31/2021    A1C 12.0 04/28/2021    A1C 9.7 12/29/2020    A1C 9.2 09/09/2020    A1C 8.8 12/30/2019    A1C 10.1 10/30/2019     Wt Readings from Last 3 Encounters:   09/21/21 238 lb (108 kg)   07/14/21 240 lb 11.2 oz (109.2 kg)   05/14/21 239 lb (108.4 kg)       Hyperlipidemia: Current therapy includes no current medications. Prefers no statin medications. States his cholesterol improved.  The 10-year ASCVD risk score ([place] TODD Jr., et al., 2013) is: 25.4%    Values used to calculate the score:      Age: 66 years      Sex: Male      Is Non- : No      Diabetic: Yes      Tobacco smoker: No      Systolic Blood Pressure: 130 mmHg      Is BP treated: No      HDL Cholesterol: 44 mg/dL      Total Cholesterol: 201 mg/dL  Recent Labs   Lab Test 03/30/22  0825 08/31/21  0806 04/21/16  1112 11/02/15  0830 06/12/15  0947   CHOL 201* 246*   < > 176 177   HDL 44 55   < > 40* 39*   * 158*   < > 113 116   TRIG 86 163*   < > 117 110   CHOLHDLRATIO  --   --   --  4.4 4.5    < > = values in this interval not displayed.       Supplements: Patient taking vitamin B12 daily and vitamin D 2000 units daily. No issues.     ED: Patient uses Viagra 50-100mg as needed and reports no issues.     Today's Vitals: There were no vitals taken for this visit.  ----------------      I spent 8 minutes with this patient today. All changes were made via collaborative practice agreement with Ramon wOen MD. A copy of the visit note was provided to the patient's provider(s).    The  patient was sent via [employer] a summary of these recommendations.     Ileana Ibarra, PharmD  Medication Therapy Management Provider, [facility]  Pager: [phone]    Telemedicine Visit Details  Type of service:  Telephone visit  Start Time: 10:04 AM  End Time: 10:12 AM  Originating Location (patient location): Home  Distant Location (provider location):  [facility]     Medication Therapy Recommendations  No medication therapy recommendations to display

## 2022-08-07 ENCOUNTER — HEALTH MAINTENANCE LETTER (OUTPATIENT)
Age: 67
End: 2022-08-07

## 2022-08-11 ENCOUNTER — TRANSFERRED RECORDS (OUTPATIENT)
Dept: MULTI SPECIALTY CLINIC | Facility: CLINIC | Age: 67
End: 2022-08-11

## 2022-08-11 LAB — RETINOPATHY: NEGATIVE

## 2022-08-19 ENCOUNTER — LAB (OUTPATIENT)
Dept: LAB | Facility: CLINIC | Age: 67
End: 2022-08-19
Payer: COMMERCIAL

## 2022-08-19 LAB — HBA1C MFR BLD: 7.8 % (ref 0–5.6)

## 2022-08-19 PROCEDURE — 83036 HEMOGLOBIN GLYCOSYLATED A1C: CPT

## 2022-08-19 PROCEDURE — 36415 COLL VENOUS BLD VENIPUNCTURE: CPT

## 2022-08-22 NOTE — PROGRESS NOTES
Medication Therapy Management (MTM) Encounter    ASSESSMENT:                            Medication Adherence/Access: No issues identified    Type 2 Diabetes: improved. Patient is meeting A1c goal of < 8% although he would benefit from goal <7%. Self monitoring of blood glucose is at goal of fasting  mg/dL. No changes today, recheck A1c in 3-4 months.     Hyperlipidemia:  Patient is not on moderate intensity statin which is indicated based on 2019 ACC/AHA guidelines for lipid management.  He declines statin therapy at this time. Recheck lipid panel in 3-4 months with next labs.     Supplements: stable    PLAN:                            1. No medication changes. Blood glucose goals: fasting in the morning should be  mg/dL and 2-3 hours after a meal should be less than 180 mg/dL    Follow-up: Return in about 1 month (around 9/23/2022) for primary care doctor visit - annual wellness.    SUBJECTIVE/OBJECTIVE:                          Bigg Lilly is a 67 year old male contacted via secure video for a follow-up visit.  Today's visit is a follow-up MTM visit from 4/1.     Reason for visit: medication refills.    Tobacco: He reports that he has never smoked. He has never used smokeless tobacco.  Alcohol: 4-6 beverages / week  Personal Healthcare Goals:  Ultimate goal is lose enough weight and eat healthy enough and exercise daily to work his way off all his current RX medications! < 205lbs. is weight goal.     Medication Adherence/Access: No issues reported now    Type 2 Diabetes:  Patient taking glipizide 5mg twice daily, metformin XR 1000mg twice daily, pioglitazone 30mg daily. Jardiance and Rybelsus too expensive. Patient is not experiencing side effects.  Blood sugar monitoring: occasional some days Ranges (Patient reported): fasting 100-130's  Symptoms of low blood sugar? none  Symptoms of high blood sugar? none  Eye exam: up to date  Foot exam: up to date  Diet/Exercise: Diet improved and walking more. No  late snacking. Eats keto bread. Usually no pasta and rice. Wife helping to improve his diet.   Aspirin: Taking 81mg daily and denies side effects  Statin: No   ACEi/ARB: No.   Urine Albumin:   Lab Results   Component Value Date    UMALCR 18.13 (H) 03/30/2022     Lab Results   Component Value Date    A1C 7.8 08/19/2022    A1C 9.2 03/30/2022    A1C 7.3 08/31/2021    A1C 12.0 04/28/2021    A1C 9.7 12/29/2020    A1C 9.2 09/09/2020    A1C 8.8 12/30/2019    A1C 10.1 10/30/2019     Wt Readings from Last 3 Encounters:   09/21/21 238 lb (108 kg)   07/14/21 240 lb 11.2 oz (109.2 kg)   05/14/21 239 lb (108.4 kg)       Hyperlipidemia: Current therapy includes no current medications. Prefers no statin medications. Declined in the past.   The 10-year ASCVD risk score ([place] TODD Jr., et al., 2013) is: 27.1%    Values used to calculate the score:      Age: 67 years      Sex: Male      Is Non- : No      Diabetic: Yes      Tobacco smoker: No      Systolic Blood Pressure: 130 mmHg      Is BP treated: No      HDL Cholesterol: 44 mg/dL      Total Cholesterol: 201 mg/dL  Recent Labs   Lab Test 03/30/22  0825 08/31/21  0806 04/21/16  1112 11/02/15  0830 06/12/15  0947   CHOL 201* 246*   < > 176 177   HDL 44 55   < > 40* 39*   * 158*   < > 113 116   TRIG 86 163*   < > 117 110   CHOLHDLRATIO  --   --   --  4.4 4.5    < > = values in this interval not displayed.       Supplements: Patient taking vitamin B12 daily and vitamin D 2000 units daily. No issues.     Today's Vitals: There were no vitals taken for this visit.  ----------------      I spent 7 minutes with this patient today. All changes were made via collaborative practice agreement with Ramon Owen MD. A copy of the visit note was provided to the patient's provider(s).    The patient was sent via [employer] a summary of these recommendations.     Ileana Ibarra, PharmD, BCACP  Medication Therapy Management Provider, [facility]  Pager: [phone]    Telemedicine Visit Details  Type of service:  Video Conference via [employer]  Start Time: 9:02 AM  End Time: 9:09 AM  Originating Location (patient location): Home  Distant Location (provider location):  [facility]     Medication Therapy Recommendations  No medication therapy recommendations to display

## 2022-08-23 ENCOUNTER — VIRTUAL VISIT (OUTPATIENT)
Dept: PHARMACY | Facility: CLINIC | Age: 67
End: 2022-08-23
Payer: COMMERCIAL

## 2022-08-23 DIAGNOSIS — E78.5 HYPERLIPIDEMIA WITH TARGET LDL LESS THAN 100: ICD-10-CM

## 2022-08-23 DIAGNOSIS — E53.8 VITAMIN B12 DEFICIENCY (NON ANEMIC): ICD-10-CM

## 2022-08-23 DIAGNOSIS — E55.9 VITAMIN D DEFICIENCY: ICD-10-CM

## 2022-08-23 PROCEDURE — 99606 MTMS BY PHARM EST 15 MIN: CPT | Performed by: PHARMACIST

## 2022-08-23 PROCEDURE — 99607 MTMS BY PHARM ADDL 15 MIN: CPT | Performed by: PHARMACIST

## 2022-08-23 NOTE — PATIENT INSTRUCTIONS
"Recommendations from today's MTM visit:                                                         1. No medication changes. Blood glucose goals: fasting in the morning should be  mg/dL and 2-3 hours after a meal should be less than 180 mg/dL      It was great speaking with you today.  I value your experience and would be very thankful for your time in providing feedback in our clinic survey. In the next few days, you may receive an email or text message from [facility] [employer] with a link to a survey related to your  clinical pharmacist.\"     To schedule another MTM appointment, please call the clinic directly or you may call the MTM scheduling line at [phone] or toll-free at [phone].     My Clinical Pharmacist's contact information:                                                      Please feel free to contact me with any questions or concerns you have.      Ileana Ibarra, PharmD, BCACP  Medication Therapy Management Provider, [facility]  "

## 2022-10-04 PROBLEM — E11.22 TYPE 2 DIABETES MELLITUS WITH DIABETIC CHRONIC KIDNEY DISEASE (H): Status: RESOLVED | Noted: 2018-03-28 | Resolved: 2019-11-05

## 2022-10-11 DIAGNOSIS — E11.22 TYPE 2 DIABETES MELLITUS WITH STAGE 1 CHRONIC KIDNEY DISEASE, WITHOUT LONG-TERM CURRENT USE OF INSULIN (H): Primary | ICD-10-CM

## 2022-10-11 DIAGNOSIS — N18.1 TYPE 2 DIABETES MELLITUS WITH STAGE 1 CHRONIC KIDNEY DISEASE, WITHOUT LONG-TERM CURRENT USE OF INSULIN (H): Primary | ICD-10-CM

## 2022-10-13 NOTE — TELEPHONE ENCOUNTER
Medication is being filled for 1 time refill only due to:  Patient needs to be seen because Return in about 53 weeks (around 9/27/2022) for Annual Wellness Visit..     Krupa refill sent. Routing to /[facility] to assist with getting pt scheduled.    Prescription approved per [facility] Refill Protocol.  Maira Thomas RN

## 2022-10-23 ENCOUNTER — HEALTH MAINTENANCE LETTER (OUTPATIENT)
Age: 67
End: 2022-10-23

## 2022-12-10 ENCOUNTER — HEALTH MAINTENANCE LETTER (OUTPATIENT)
Age: 67
End: 2022-12-10

## 2022-12-29 ENCOUNTER — LAB (OUTPATIENT)
Dept: LAB | Facility: CLINIC | Age: 67
End: 2022-12-29
Payer: COMMERCIAL

## 2022-12-29 ENCOUNTER — ALLIED HEALTH/NURSE VISIT (OUTPATIENT)
Dept: FAMILY MEDICINE | Facility: CLINIC | Age: 67
End: 2022-12-29
Payer: COMMERCIAL

## 2022-12-29 DIAGNOSIS — E11.22 TYPE 2 DIABETES MELLITUS WITH DIABETIC CHRONIC KIDNEY DISEASE (H): ICD-10-CM

## 2022-12-29 DIAGNOSIS — E11.22 TYPE 2 DIABETES MELLITUS WITH STAGE 1 CHRONIC KIDNEY DISEASE, WITHOUT LONG-TERM CURRENT USE OF INSULIN (H): ICD-10-CM

## 2022-12-29 DIAGNOSIS — N18.1 TYPE 2 DIABETES MELLITUS WITH STAGE 1 CHRONIC KIDNEY DISEASE, WITHOUT LONG-TERM CURRENT USE OF INSULIN (H): ICD-10-CM

## 2022-12-29 DIAGNOSIS — Z23 NEED FOR PROPHYLACTIC VACCINATION AND INOCULATION AGAINST INFLUENZA: Primary | ICD-10-CM

## 2022-12-29 DIAGNOSIS — E78.5 HYPERLIPIDEMIA WITH TARGET LDL LESS THAN 100: ICD-10-CM

## 2022-12-29 LAB
ALBUMIN SERPL BCG-MCNC: 4.3 G/DL (ref 3.5–5.2)
ALP SERPL-CCNC: 65 U/L (ref 40–129)
ALT SERPL W P-5'-P-CCNC: 19 U/L (ref 10–50)
ANION GAP SERPL CALCULATED.3IONS-SCNC: 13 MMOL/L (ref 7–15)
AST SERPL W P-5'-P-CCNC: 23 U/L (ref 10–50)
BILIRUB SERPL-MCNC: 0.5 MG/DL
BUN SERPL-MCNC: 14.4 MG/DL (ref 8–23)
CALCIUM SERPL-MCNC: 9.5 MG/DL (ref 8.8–10.2)
CHLORIDE SERPL-SCNC: 103 MMOL/L (ref 98–107)
CHOLEST SERPL-MCNC: 237 MG/DL
CREAT SERPL-MCNC: 1.04 MG/DL (ref 0.67–1.17)
DEPRECATED HCO3 PLAS-SCNC: 23 MMOL/L (ref 22–29)
GFR SERPL CREATININE-BSD FRML MDRD: 79 ML/MIN/1.73M2
GLUCOSE SERPL-MCNC: 109 MG/DL (ref 70–99)
HBA1C MFR BLD: 7.8 % (ref 0–5.6)
HDLC SERPL-MCNC: 48 MG/DL
LDLC SERPL CALC-MCNC: 159 MG/DL
NONHDLC SERPL-MCNC: 189 MG/DL
POTASSIUM SERPL-SCNC: 4.7 MMOL/L (ref 3.4–5.3)
PROT SERPL-MCNC: 7.3 G/DL (ref 6.4–8.3)
SODIUM SERPL-SCNC: 139 MMOL/L (ref 136–145)
TRIGL SERPL-MCNC: 151 MG/DL

## 2022-12-29 PROCEDURE — 80053 COMPREHEN METABOLIC PANEL: CPT

## 2022-12-29 PROCEDURE — G0008 ADMIN INFLUENZA VIRUS VAC: HCPCS

## 2022-12-29 PROCEDURE — 99207 PR NO CHARGE NURSE ONLY: CPT

## 2022-12-29 PROCEDURE — 90662 IIV NO PRSV INCREASED AG IM: CPT

## 2022-12-29 PROCEDURE — 36415 COLL VENOUS BLD VENIPUNCTURE: CPT

## 2022-12-29 PROCEDURE — 83036 HEMOGLOBIN GLYCOSYLATED A1C: CPT

## 2022-12-29 PROCEDURE — 80061 LIPID PANEL: CPT

## 2023-01-10 ENCOUNTER — TRANSFERRED RECORDS (OUTPATIENT)
Dept: HEALTH INFORMATION MANAGEMENT | Facility: CLINIC | Age: 68
End: 2023-01-10
Payer: COMMERCIAL

## 2023-01-24 ASSESSMENT — ENCOUNTER SYMPTOMS
DIARRHEA: 0
SORE THROAT: 0
NERVOUS/ANXIOUS: 0
DIZZINESS: 0
HEMATOCHEZIA: 0
FEVER: 0
WEAKNESS: 0
PARESTHESIAS: 0
JOINT SWELLING: 0
HEMATURIA: 0
COUGH: 0
MYALGIAS: 0
PALPITATIONS: 0
FREQUENCY: 1
EYE PAIN: 0
HEADACHES: 0
NAUSEA: 0
DYSURIA: 0
CONSTIPATION: 0
CHILLS: 0
HEARTBURN: 0
ABDOMINAL PAIN: 0
SHORTNESS OF BREATH: 0
ARTHRALGIAS: 0

## 2023-01-24 ASSESSMENT — ACTIVITIES OF DAILY LIVING (ADL): CURRENT_FUNCTION: NO ASSISTANCE NEEDED

## 2023-01-25 ENCOUNTER — OFFICE VISIT (OUTPATIENT)
Dept: FAMILY MEDICINE | Facility: CLINIC | Age: 68
End: 2023-01-25
Payer: COMMERCIAL

## 2023-01-25 ENCOUNTER — PATIENT OUTREACH (OUTPATIENT)
Dept: FAMILY MEDICINE | Facility: CLINIC | Age: 68
End: 2023-01-25

## 2023-01-25 VITALS
DIASTOLIC BLOOD PRESSURE: 84 MMHG | OXYGEN SATURATION: 98 % | TEMPERATURE: 98.1 F | WEIGHT: 264 LBS | HEART RATE: 76 BPM | SYSTOLIC BLOOD PRESSURE: 160 MMHG | RESPIRATION RATE: 18 BRPM | HEIGHT: 69 IN | BODY MASS INDEX: 39.1 KG/M2

## 2023-01-25 DIAGNOSIS — Z00.00 ENCOUNTER FOR MEDICARE ANNUAL WELLNESS EXAM: Primary | ICD-10-CM

## 2023-01-25 DIAGNOSIS — E66.01 MORBID OBESITY (H): ICD-10-CM

## 2023-01-25 DIAGNOSIS — N18.1 TYPE 2 DIABETES MELLITUS WITH STAGE 1 CHRONIC KIDNEY DISEASE, WITHOUT LONG-TERM CURRENT USE OF INSULIN (H): ICD-10-CM

## 2023-01-25 DIAGNOSIS — E11.22 TYPE 2 DIABETES MELLITUS WITH STAGE 1 CHRONIC KIDNEY DISEASE, WITHOUT LONG-TERM CURRENT USE OF INSULIN (H): ICD-10-CM

## 2023-01-25 DIAGNOSIS — I87.2 STASIS DERMATITIS OF BOTH LEGS: ICD-10-CM

## 2023-01-25 DIAGNOSIS — I10 ESSENTIAL HYPERTENSION: ICD-10-CM

## 2023-01-25 DIAGNOSIS — Z71.89 ADVANCED DIRECTIVES, COUNSELING/DISCUSSION: ICD-10-CM

## 2023-01-25 DIAGNOSIS — E78.5 HYPERLIPIDEMIA WITH TARGET LDL LESS THAN 100: ICD-10-CM

## 2023-01-25 DIAGNOSIS — Z12.11 SCREEN FOR COLON CANCER: ICD-10-CM

## 2023-01-25 DIAGNOSIS — E11.22 CKD STAGE 1 DUE TO TYPE 2 DIABETES MELLITUS (H): ICD-10-CM

## 2023-01-25 DIAGNOSIS — N18.1 CKD STAGE 1 DUE TO TYPE 2 DIABETES MELLITUS (H): ICD-10-CM

## 2023-01-25 DIAGNOSIS — Z12.5 SPECIAL SCREENING FOR MALIGNANT NEOPLASM OF PROSTATE: ICD-10-CM

## 2023-01-25 DIAGNOSIS — Z28.39 IMMUNIZATION DEFICIENCY: ICD-10-CM

## 2023-01-25 PROBLEM — Z28.21 SEVERE ACUTE RESPIRATORY SYNDROME CORONAVIRUS 2 (SARS-COV-2) VACCINATION DECLINED: Status: RESOLVED | Noted: 2021-09-21 | Resolved: 2023-01-25

## 2023-01-25 PROBLEM — Z23 PNEUMOCOCCAL VACCINATION ADMINISTERED AT CURRENT VISIT: Status: RESOLVED | Noted: 2021-09-21 | Resolved: 2023-01-25

## 2023-01-25 PROBLEM — Z23 FLU VACCINE NEED: Status: RESOLVED | Noted: 2021-09-21 | Resolved: 2023-01-25

## 2023-01-25 LAB — PSA SERPL-MCNC: 0.48 NG/ML (ref 0–4.5)

## 2023-01-25 PROCEDURE — 36415 COLL VENOUS BLD VENIPUNCTURE: CPT | Performed by: FAMILY MEDICINE

## 2023-01-25 PROCEDURE — 99207 PR FOOT EXAM NO CHARGE: CPT | Mod: 25 | Performed by: FAMILY MEDICINE

## 2023-01-25 PROCEDURE — G0439 PPPS, SUBSEQ VISIT: HCPCS | Performed by: FAMILY MEDICINE

## 2023-01-25 PROCEDURE — G0103 PSA SCREENING: HCPCS | Performed by: FAMILY MEDICINE

## 2023-01-25 PROCEDURE — 99214 OFFICE O/P EST MOD 30 MIN: CPT | Mod: 25 | Performed by: FAMILY MEDICINE

## 2023-01-25 RX ORDER — GLIPIZIDE 5 MG/1
5 TABLET ORAL
Qty: 180 TABLET | Refills: 2 | Status: SHIPPED | OUTPATIENT
Start: 2023-01-25 | End: 2023-10-30

## 2023-01-25 RX ORDER — LISINOPRIL 10 MG/1
10 TABLET ORAL DAILY
Qty: 90 TABLET | Refills: 1 | Status: SHIPPED | OUTPATIENT
Start: 2023-01-25 | End: 2023-08-11

## 2023-01-25 RX ORDER — METFORMIN HCL 500 MG
1000 TABLET, EXTENDED RELEASE 24 HR ORAL 2 TIMES DAILY WITH MEALS
Qty: 360 TABLET | Refills: 2 | Status: SHIPPED | OUTPATIENT
Start: 2023-01-25 | End: 2023-10-30

## 2023-01-25 RX ORDER — PIOGLITAZONEHYDROCHLORIDE 30 MG/1
30 TABLET ORAL DAILY
Qty: 90 TABLET | Refills: 2 | Status: SHIPPED | OUTPATIENT
Start: 2023-01-25 | End: 2023-10-30

## 2023-01-25 SDOH — ECONOMIC STABILITY: TRANSPORTATION INSECURITY
IN THE PAST 12 MONTHS, HAS THE LACK OF TRANSPORTATION KEPT YOU FROM MEDICAL APPOINTMENTS OR FROM GETTING MEDICATIONS?: NO

## 2023-01-25 SDOH — ECONOMIC STABILITY: TRANSPORTATION INSECURITY
IN THE PAST 12 MONTHS, HAS LACK OF TRANSPORTATION KEPT YOU FROM MEETINGS, WORK, OR FROM GETTING THINGS NEEDED FOR DAILY LIVING?: NO

## 2023-01-25 SDOH — HEALTH STABILITY: PHYSICAL HEALTH: ON AVERAGE, HOW MANY DAYS PER WEEK DO YOU ENGAGE IN MODERATE TO STRENUOUS EXERCISE (LIKE A BRISK WALK)?: 1 DAY

## 2023-01-25 SDOH — ECONOMIC STABILITY: INCOME INSECURITY: IN THE LAST 12 MONTHS, WAS THERE A TIME WHEN YOU WERE NOT ABLE TO PAY THE MORTGAGE OR RENT ON TIME?: NO

## 2023-01-25 SDOH — ECONOMIC STABILITY: FOOD INSECURITY: WITHIN THE PAST 12 MONTHS, YOU WORRIED THAT YOUR FOOD WOULD RUN OUT BEFORE YOU GOT MONEY TO BUY MORE.: NEVER TRUE

## 2023-01-25 SDOH — HEALTH STABILITY: PHYSICAL HEALTH: ON AVERAGE, HOW MANY MINUTES DO YOU ENGAGE IN EXERCISE AT THIS LEVEL?: 10 MIN

## 2023-01-25 SDOH — ECONOMIC STABILITY: FOOD INSECURITY: WITHIN THE PAST 12 MONTHS, THE FOOD YOU BOUGHT JUST DIDN'T LAST AND YOU DIDN'T HAVE MONEY TO GET MORE.: NEVER TRUE

## 2023-01-25 SDOH — ECONOMIC STABILITY: INCOME INSECURITY: HOW HARD IS IT FOR YOU TO PAY FOR THE VERY BASICS LIKE FOOD, HOUSING, MEDICAL CARE, AND HEATING?: NOT HARD AT ALL

## 2023-01-25 ASSESSMENT — ENCOUNTER SYMPTOMS
HEARTBURN: 0
NAUSEA: 0
WEAKNESS: 0
MYALGIAS: 0
SHORTNESS OF BREATH: 0
FEVER: 0
JOINT SWELLING: 0
HEMATURIA: 0
ABDOMINAL PAIN: 0
HEADACHES: 0
NERVOUS/ANXIOUS: 0
COUGH: 0
PALPITATIONS: 0
PARESTHESIAS: 0
SORE THROAT: 0
DIZZINESS: 0
CONSTIPATION: 0
HEMATOCHEZIA: 0
FREQUENCY: 1
EYE PAIN: 0
CHILLS: 0
DIARRHEA: 0
ARTHRALGIAS: 0
DYSURIA: 0

## 2023-01-25 ASSESSMENT — LIFESTYLE VARIABLES
HOW MANY STANDARD DRINKS CONTAINING ALCOHOL DO YOU HAVE ON A TYPICAL DAY: 1 OR 2
SKIP TO QUESTIONS 9-10: 1
HOW OFTEN DO YOU HAVE SIX OR MORE DRINKS ON ONE OCCASION: NEVER
HOW OFTEN DO YOU HAVE A DRINK CONTAINING ALCOHOL: MONTHLY OR LESS
AUDIT-C TOTAL SCORE: 1

## 2023-01-25 ASSESSMENT — SOCIAL DETERMINANTS OF HEALTH (SDOH)
DO YOU BELONG TO ANY CLUBS OR ORGANIZATIONS SUCH AS CHURCH GROUPS UNIONS, FRATERNAL OR ATHLETIC GROUPS, OR SCHOOL GROUPS?: YES
HOW OFTEN DO YOU ATTEND CHURCH OR RELIGIOUS SERVICES?: MORE THAN 4 TIMES PER YEAR
HOW OFTEN DO YOU GET TOGETHER WITH FRIENDS OR RELATIVES?: TWICE A WEEK
IN A TYPICAL WEEK, HOW MANY TIMES DO YOU TALK ON THE PHONE WITH FAMILY, FRIENDS, OR NEIGHBORS?: MORE THAN THREE TIMES A WEEK

## 2023-01-25 ASSESSMENT — ACTIVITIES OF DAILY LIVING (ADL): CURRENT_FUNCTION: NO ASSISTANCE NEEDED

## 2023-01-25 NOTE — ASSESSMENT & PLAN NOTE
Controlled, at the high end.  Discussed adding semaglutide to his regimen.  He would like to consider

## 2023-01-25 NOTE — PATIENT INSTRUCTIONS
Patient Education   Personalized Prevention Plan  You are due for the preventive services outlined below.  Your care team is available to assist you in scheduling these services.  If you have already completed any of these items, please share that information with your care team to update in your medical record.  Health Maintenance Due   Topic Date Due    COVID-19 Vaccine (2 - Booster for [place] series) 01/06/2022    ANNUAL REVIEW OF HM ORDERS  04/22/2022    Annual Wellness Visit  09/21/2022    Diabetic Foot Exam  09/21/2022    Colorectal Cancer Screening  11/02/2022    Pneumococcal Vaccine (2 - PCV) 09/21/2022        Semaglutide: new diabetes medicine

## 2023-01-25 NOTE — TELEPHONE ENCOUNTER
SB 3 PAL Welcome Letter Sent    Shira JIN RN   Patient Advocate Kei MIKE RN  [facility]  [phone]

## 2023-01-25 NOTE — ASSESSMENT & PLAN NOTE
Blood pressure similar on repeat.  Start therapy.  Lisinopril.  Pathophysiology, natural history, benefits of  Treatment discussed

## 2023-01-25 NOTE — ASSESSMENT & PLAN NOTE
"His weight is increased.  Discussed benefits of weight loss.  He proposes to \"get it down\".  Discussed semaglutide therapy, bariatric referral.  He will consider  "

## 2023-01-25 NOTE — ASSESSMENT & PLAN NOTE
His CV risk is quite high.  Discussed natural history of hyperlipidemia.  He proposes to consider a statin in the spring upon lipid recheck.  Discussed early presentation to ED in the event of cardiac symptoms

## 2023-01-25 NOTE — PROGRESS NOTES
"The 10-year ASCVD risk score ([place] KNIGHT, et al., 2019) is: 38.5%    Values used to calculate the score:      Age: 67 years      Sex: Male      Is Non- : No      Diabetic: Yes      Tobacco smoker: No      Systolic Blood Pressure: 160 mmHg      Is BP treated: No      HDL Cholesterol: 48 mg/dL      Total Cholesterol: 237 mg/dL    Answers for HPI/ROS submitted by the patient on 1/24/2023  In general, how would you rate your overall physical health?: good  Frequency of exercise:: 2-3 days/week  Do you usually eat at least 4 servings of fruit and vegetables a day, include whole grains & fiber, and avoid regularly eating high fat or \"junk\" foods? : No  Taking medications regularly:: Yes  Medication side effects:: Not applicable  Activities of Daily Living: no assistance needed  Home safety: no safety concerns identified  Hearing Impairment:: no hearing concerns  In the past 6 months, have you been bothered by leaking of urine?: No  abdominal pain: No  Blood in stool: No  Blood in urine: No  chest pain: No  chills: No  congestion: No  constipation: No  cough: No  diarrhea: No  dizziness: No  ear pain: No  eye pain: No  nervous/anxious: No  fever: No  frequency: Yes  genital sores: No  headaches: No  hearing loss: Yes  heartburn: No  arthralgias: No  joint swelling: No  peripheral edema: No  mood changes: No  myalgias: No  nausea: No  dysuria: No  palpitations: No  Skin sensation changes: No  sore throat: No  urgency: Yes  rash: No  shortness of breath: No  visual disturbance: Yes  weakness: No  impotence: Yes  penile discharge: No  In general, how would you rate your overall mental or emotional health?: excellent  Duration of exercise:: 15-30 minutes    Ramon Owen MD    "

## 2023-01-25 NOTE — LETTER
Bigg Lilly  [address] [facility] [address]      Cici Josr,    Thank you for choosing [facility] today for your health care needs.     [facility] is transforming primary care  At [facility], we re dedicated to constantly improve how we serve the health care needs of our patients and communities. We re currently making changes to the way we deliver care.     Changes you ll notice include:    An emphasis on building a relationship with a primary care provider    Access to a PAL (patient advocate and liaison) to help guide you with your care needs    Appointment lengths tailored to your specific needs and greater access to a care team to help you and your provider improve and maintain your health and well-being    Improved online access to your care team    Benefits of a primary care provider  If you don t have a designated primary care provider, we encourage you to get to know our care team online and find a provider you d like to see. Most of our providers have a short video on their online provider page. Visit [email or web address] to explore our providers and locations.    Benefits of having a primary care provider include:      They get to know you - your health history, family history and goals, making it easier to make a health plan together.     You get to know them - making health-related conversations and decisions easier      Primary care doctors help you when you re sick or hurt - but also focus on keeping you healthy with preventive care and screenings.      A doctor who sees you regularly is more likely to notice changes in your health.     You ll be connected to a broad care team who partners with your provider to support you.    Patient Advocate Liaison (PAL)   To help make sure you get the right care, at the right time, we include PALs, or Patient Advocate Liaisons, as part of your care team. Your PAL will be your first line of contact. They ll advocate for your needs and  help you navigate our services, connecting you with care team members and community resources to ensure your care is well coordinated. You ll be introduced to a PAL in an upcoming visit.     Expanded care team access with tailored appointment lengths  Depending on your health care needs, you may have longer or shorter appointments and see additional care team providers - including Medication Therapy Management (MTM) pharmacists, diabetes educators, behavioral health clinicians, or social workers. At times, they may be included in your visit with your provider, or you may see them individually.     Online access to your health care records and care team  [employer] is our online tool that makes it easy to see your health care information and communicate with your care team.     [employer] allows you to:     View your health maintenance plan so you know when you re due for a preventive screening    Send secure messages to your care team    View your health history and visit summaries     Schedule appointments     Complete questionnaires and eCheck-in before appointments      Get care from your provider with an e-visit      View and pay your bill     Sign up at [email or web address]. Once you have an account, you also can download the mobile [employer]ica.     Connecting to fast and convenient care  When you need fast, convenient care - consider one of the following options:       Video Visit: A convenient care option for visiting with your provider out of the comfort of your own home. Most of the things you come to the clinic to address with your provider can now be done virtually through a video. This includes your chronic medication follow up, questions or concerns you may have, and even your annual Medicare Wellness Visit.       Phone Visit: Another convenient option for follow up of common problems that may require a more in-depth discussion with your provider.       E-visit: When you need acute care quickly, or have a quick  question about your medication, an E-visit is completed through [employer] and your provider will respond within one business day.                Shira JIN RN  Patient Advocate Liaison - PAL RN  [facility]  [phone]

## 2023-01-25 NOTE — PROGRESS NOTES
"SUBJECTIVE:   Josr is a 67 year old who presents for Preventive Visit.  Patient has been advised of split billing requirements and indicates understanding: Yes  Are you in the first 12 months of your Medicare coverage?  No    Healthy Habits:     In general, how would you rate your overall health?  Good    Frequency of exercise:  2-3 days/week    Duration of exercise:  15-30 minutes    Do you usually eat at least 4 servings of fruit and vegetables a day, include whole grains    & fiber and avoid regularly eating high fat or \"junk\" foods?  No    Taking medications regularly:  Yes    Medication side effects:  Not applicable    Ability to successfully perform activities of daily living:  No assistance needed    Home Safety:  No safety concerns identified    Hearing Impairment:  No hearing concerns    In the past 6 months, have you been bothered by leaking of urine?  No    In general, how would you rate your overall mental or emotional health?  Excellent      PHQ-2 Total Score: 0      Have you ever done Advance Care Planning? (For example, a Health Directive, POLST, or a discussion with a medical provider or your loved ones about your wishes): No, advance care planning information given to patient to review.  Patient declined advance care planning discussion at this time.       Fall risk  Fallen 2 or more times in the past year?: No  Any fall with injury in the past year?: No    Cognitive Screening   1) Repeat 3 items (Leader, Season, Table)  2) Clock draw: NORMAL  3) 3 item recall: Recalls 3 objects  Results: 3 items recalled: COGNITIVE IMPAIRMENT LESS LIKELY    Mini-CogTM Copyright LATANYA Ahmadi. Licensed by the author for use in [facility]; reprinted with permission (cindy@.[facility]). All rights reserved.      Do you have sleep apnea, excessive snoring or daytime drowsiness?: no    Reviewed and updated as needed this visit by clinical staff   Tobacco  Allergies  Meds              Reviewed and updated as needed " this visit by Provider                 Social History     Tobacco Use     Smoking status: Never     Smokeless tobacco: Never   Substance Use Topics     Alcohol use: Yes     Alcohol/week: 3.3 standard drinks     Comment: occasional       Alcohol Use 1/24/2023   Prescreen: >3 drinks/day or >7 drinks/week? No   Prescreen: >3 drinks/day or >7 drinks/week? -       Current providers sharing in care for this patient include: Patient Care Team:  Ramon Owen MD as PCP - General (Family Practice)  Ramon Owen MD as Assigned PCP  Ileana Ibarra RPH as Pharmacist (Pharmacist)  Ileana Ibarra RPH as Assigned [facility] Pharmacist  Enoc, Shira JIN, RN as Personal Advocate & Liaison (PAL)    The following health maintenance items are reviewed in Epic and correct as of today:  Health Maintenance   Topic Date Due     COVID-19 Vaccine (2 - Booster for Nato series) 01/06/2022     Pneumococcal Vaccine: 65+ Years (2 - PCV) 09/21/2022     A1C  03/29/2023     MICROALBUMIN  03/30/2023     CMP  06/29/2023     LIPID  06/29/2023     EYE EXAM  08/11/2023     BMP  12/29/2023     MEDICARE ANNUAL WELLNESS VISIT  01/25/2024     DIABETIC FOOT EXAM  01/25/2024     ANNUAL REVIEW OF HM ORDERS  01/25/2024     FALL RISK ASSESSMENT  01/25/2024     DTAP/TDAP/TD IMMUNIZATION (3 - Td or Tdap) 04/23/2024     ADVANCE CARE PLANNING  01/25/2028     COLORECTAL CANCER SCREENING  01/10/2033     HEPATITIS C SCREENING  Completed     PHQ-2 (once per calendar year)  Completed     INFLUENZA VACCINE  Completed     URINALYSIS  Completed     ZOSTER IMMUNIZATION  Completed     IPV IMMUNIZATION  Aged Out     MENINGITIS IMMUNIZATION  Aged Out     AORTIC ANEURYSM SCREENING (SYSTEM ASSIGNED)  Discontinued               Review of Systems   Constitutional: Negative for chills and fever.   HENT: Positive for hearing loss. Negative for congestion, ear pain and sore throat.    Eyes: Negative for pain and visual disturbance.   Respiratory: Negative for cough and  "shortness of breath.    Cardiovascular: Negative for chest pain, palpitations and peripheral edema.   Gastrointestinal: Negative for abdominal pain, constipation, diarrhea, heartburn, hematochezia and nausea.   Genitourinary: Positive for frequency, impotence and urgency. Negative for dysuria, genital sores, hematuria and penile discharge.   Musculoskeletal: Negative for arthralgias, joint swelling and myalgias.   Skin: Negative for rash.   Neurological: Negative for dizziness, weakness, headaches and paresthesias.   Psychiatric/Behavioral: Negative for mood changes. The patient is not nervous/anxious.          OBJECTIVE:   BP (!) 160/84 (BP Location: Right arm, Patient Position: Chair, Cuff Size: Adult Large)   Pulse 76   Temp 98.1  F (36.7  C) (Oral)   Resp 18   Ht 1.74 m (5' 8.5\")   Wt 119.7 kg (264 lb)   SpO2 98%   BMI 39.56 kg/m   Estimated body mass index is 39.56 kg/m  as calculated from the following:    Height as of this encounter: 1.74 m (5' 8.5\").    Weight as of this encounter: 119.7 kg (264 lb).  Physical Exam  Constitutional:       Appearance: Normal appearance.   HENT:      Head: Atraumatic.      Right Ear: Tympanic membrane normal.      Left Ear: Tympanic membrane normal.      Nose: Nose normal.      Mouth/Throat:      Mouth: Mucous membranes are moist.   Eyes:      Pupils: Pupils are equal, round, and reactive to light.   Cardiovascular:      Rate and Rhythm: Normal rate and regular rhythm.      Heart sounds: Normal heart sounds.   Pulmonary:      Effort: Pulmonary effort is normal.      Breath sounds: Normal breath sounds.   Abdominal:      General: Bowel sounds are normal.      Palpations: Abdomen is soft.   Musculoskeletal:      Cervical back: Neck supple.      Right lower leg: No edema.      Left lower leg: No edema.   Skin:     General: Skin is warm and dry.      Comments: Brawny changes described.  Feet with intact skin warm and dry   Neurological:      General: No focal deficit " "present.      Mental Status: He is alert.   Psychiatric:         Mood and Affect: Mood normal.               ASSESSMENT / PLAN:     Problem List Items Addressed This Visit     Advanced directives, counseling/discussion     Discussed.  Honoring choices dispensed.         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  Add ACE         Relevant Medications    glipiZIDE (GLUCOTROL) 5 MG tablet    metFORMIN (GLUCOPHAGE XR) 500 MG 24 hr tablet    pioglitazone (ACTOS) 30 MG tablet    Essential hypertension     Blood pressure similar on repeat.  Start therapy.  Lisinopril.  Pathophysiology, natural history, benefits of  Treatment discussed         Relevant Medications    lisinopril (ZESTRIL) 10 MG tablet    Hyperlipidemia with target LDL less than 100     His CV risk is quite high.  Discussed natural history of hyperlipidemia.  He proposes to consider a statin in the spring upon lipid recheck.  Discussed early presentation to ED in the event of cardiac symptoms         Immunization deficiency     Offered.  He declines all         Morbid obesity (H)     His weight is increased.  Discussed benefits of weight loss.  He proposes to \"get it down\".  Discussed semaglutide therapy, bariatric referral.  He will consider         Relevant Medications    glipiZIDE (GLUCOTROL) 5 MG tablet    metFORMIN (GLUCOPHAGE XR) 500 MG 24 hr tablet    pioglitazone (ACTOS) 30 MG tablet    Screen for colon cancer     He reports he just had a colonoscopy.  Request records         Special screening for malignant neoplasm of prostate     His wife wants him checked.  He has been participating, and is due         Relevant Orders    PSA, screen    Stasis dermatitis of both legs     No current edema, skin intact.  Brawny changes.  Discussed knee-high socks, natural history         Type 2 diabetes mellitus with stage 1 chronic kidney disease, without long-term current use of insulin (H)       Controlled, at the high end.  Discussed adding " semaglutide to his regimen.  He would like to consider         Relevant Medications    glipiZIDE (GLUCOTROL) 5 MG tablet    metFORMIN (GLUCOPHAGE XR) 500 MG 24 hr tablet    pioglitazone (ACTOS) 30 MG tablet    Other Relevant Orders    FOOT EXAM (Completed)   Other Visit Diagnoses     Encounter for Medicare annual wellness exam    -  Primary          Patient has been advised of split billing requirements and indicates understanding: Yes      COUNSELING:  Reviewed preventive health counseling, as reflected in patient instructions        He reports that he has never smoked. He has never used smokeless tobacco.      Appropriate preventive services were discussed with this patient, including applicable screening as appropriate for cardiovascular disease, diabetes, osteopenia/osteoporosis, and glaucoma.  As appropriate for age/gender, discussed screening for colorectal cancer, prostate cancer, breast cancer, and cervical cancer. Checklist reviewing preventive services available has been given to the patient.    Reviewed patients plan of care and provided an AVS. The Basic Care Plan (routine screening as documented in Health Maintenance) for Bigg meets the Care Plan requirement. This Care Plan has been established and reviewed with the Patient.      Ramon Owen MD  [facility]    Identified Health Risks:

## 2023-02-01 ENCOUNTER — PATIENT OUTREACH (OUTPATIENT)
Dept: FAMILY MEDICINE | Facility: CLINIC | Age: 68
End: 2023-02-01
Payer: COMMERCIAL

## 2023-02-01 NOTE — TELEPHONE ENCOUNTER
Erroneous encounter- please disregard    Shira JIN RN  Patient Advocate Liaison - PAL RN  [facility]  [phone]

## 2023-04-02 ENCOUNTER — HEALTH MAINTENANCE LETTER (OUTPATIENT)
Age: 68
End: 2023-04-02

## 2023-07-25 NOTE — PROGRESS NOTES
"Future Appointments   Date Time Provider Department Center   9/21/2021  8:30 AM Ramon Owen MD [facility]     Appointment Notes for this encounter:   physical    Health Maintenance Due   Topic Date Due     COVID-19 Vaccine (1) Never done     FALL RISK ASSESSMENT  07/16/2021     AORTIC ANEURYSM SCREENING (SYSTEM ASSIGNED)  Never done     EYE EXAM  10/01/2021     Health Maintenance addressed:  Eye Exam and Flu Vaccine    Eye Exam Previously completed - pt reported / Care Everywhere / Completed PUMA - sent to abstract and Immunizations Pt will update today    MyChart Status:  Active and Using    SUBJECTIVE:   Bigg Lilly is a 66 year old male who presents for Preventive Visit.      Patient has been advised of split billing requirements and indicates understanding: Yes   Are you in the first 12 months of your Medicare coverage?  No    Healthy Habits:     In general, how would you rate your overall health?  Good    Frequency of exercise:  1 day/week    Duration of exercise:  30-45 minutes    Do you usually eat at least 4 servings of fruit and vegetables a day, include whole grains    & fiber and avoid regularly eating high fat or \"junk\" foods?  No    Taking medications regularly:  No    Medication side effects:  None    Ability to successfully perform activities of daily living:  No assistance needed    Home Safety:  No safety concerns identified    Hearing Impairment:  No hearing concerns    In the past 6 months, have you been bothered by leaking of urine?  No    In general, how would you rate your overall mental or emotional health?  Excellent      PHQ-2 Total Score: 0    Additional concerns today:  No    Do you feel safe in your environment? Yes    Have you ever done Advance Care Planning? (For example, a Health Directive, POLST, or a discussion with a medical provider or your loved ones about your wishes): No, advance care planning information given to patient to review.  Patient declined advance care planning " discussion at this time.       Fall risk  Fallen 2 or more times in the past year?: (P) No  Any fall with injury in the past year?: (P) No    Cognitive Screening   1) Repeat 3 items (Leader, Season, Table)    2) Clock draw: NORMAL  3) 3 item recall: Recalls 2 objects   Results: NORMAL clock, 1-2 items recalled: COGNITIVE IMPAIRMENT LESS LIKELY    Mini-CogTM Copyright LATANYA Ahmadi. Licensed by the author for use in [facility]; reprinted with permission (cindy@[facility]). All rights reserved.      Do you have sleep apnea, excessive snoring or daytime drowsiness?: no    Reviewed and updated as needed this visit by clinical staff  Tobacco  Allergies  Meds     Fam Hx  Soc Hx        Reviewed and updated as needed this visit by Provider    Meds             Social History     Tobacco Use     Smoking status: Never Smoker     Smokeless tobacco: Never Used   Substance Use Topics     Alcohol use: Yes     Alcohol/week: 3.3 standard drinks     Comment: occasional         Alcohol Use 9/21/2021   Prescreen: >3 drinks/day or >7 drinks/week? No   Prescreen: >3 drinks/day or >7 drinks/week? -               Current providers sharing in care for this patient include:   Patient Care Team:  Ramon Owen MD as PCP - General (Family Practice)  Ramon Owen MD as Assigned PCP  Ileana Iabrra RPH as Pharmacist (Pharmacist)    The following health maintenance items are reviewed in Epic and correct as of today:  Health Maintenance Due   Topic Date Due     COVID-19 Vaccine (1) Never done     FALL RISK ASSESSMENT  07/16/2021     AORTIC ANEURYSM SCREENING (SYSTEM ASSIGNED)  Never done     EYE EXAM  10/01/2021               Review of Systems   Constitutional: Negative for chills and fever.   HENT: Negative for congestion, ear pain, hearing loss and sore throat.    Eyes: Negative for pain and visual disturbance.   Respiratory: Negative for cough and shortness of breath.    Cardiovascular: Negative for chest pain, palpitations and  "peripheral edema.   Gastrointestinal: Negative for abdominal pain, constipation, diarrhea, heartburn, hematochezia and nausea.   Genitourinary: Negative for discharge, dysuria, frequency, genital sores, hematuria, impotence and urgency.   Musculoskeletal: Negative for arthralgias, joint swelling and myalgias.   Skin: Negative for rash.   Neurological: Negative for dizziness, weakness, headaches and paresthesias.   Psychiatric/Behavioral: Negative for mood changes. The patient is not nervous/anxious.          OBJECTIVE:   /76 (BP Location: Right arm, Patient Position: Sitting, Cuff Size: Adult Large)   Pulse 74   Temp 98.3  F (36.8  C) (Oral)   Ht 1.727 m (5' 8\")   Wt 108 kg (238 lb)   SpO2 97%   BMI 36.19 kg/m   Estimated body mass index is 36.19 kg/m  as calculated from the following:    Height as of this encounter: 1.727 m (5' 8\").    Weight as of this encounter: 108 kg (238 lb).  Physical Exam  Constitutional:       Appearance: Normal appearance.   HENT:      Head: Atraumatic.      Right Ear: Tympanic membrane normal.      Left Ear: Tympanic membrane normal.      Nose: Nose normal.      Mouth/Throat:      Mouth: Mucous membranes are moist.   Eyes:      Pupils: Pupils are equal, round, and reactive to light.   Cardiovascular:      Rate and Rhythm: Normal rate and regular rhythm.      Heart sounds: Normal heart sounds.   Pulmonary:      Effort: Pulmonary effort is normal.      Breath sounds: Normal breath sounds.   Abdominal:      General: Abdomen is flat. Bowel sounds are normal.   Musculoskeletal:      Cervical back: Neck supple.   Skin:     General: Skin is warm and dry.      Comments: Feet with intact skin, pulses, monofilament exam     Neurological:      General: No focal deficit present.      Mental Status: He is alert.   Psychiatric:         Mood and Affect: Mood normal.               ASSESSMENT / PLAN:     Problem List Items Addressed This Visit     CKD stage 1 due to type 2 diabetes mellitus " (H)     GFR Estimate   Date Value Ref Range Status   08/31/2021 73 >60 mL/min/1.73m2 Final     Comment:     As of July 11, 2021, eGFR is calculated by the CKD-EPI creatinine equation, without race adjustment. eGFR can be influenced by muscle mass, exercise, and diet. The reported eGFR is an estimation only and is only applicable if the renal function is stable.   04/28/2021 >90 >60 mL/min/[1.73_m2] Final     Comment:     Non  GFR Calc  Starting 12/18/2018, serum creatinine based estimated GFR (eGFR) will be   calculated using the Chronic Kidney Disease Epidemiology Collaboration   (CKD-EPI) equation.       Microalbuminuria now 4 years ago.  Not on ACE/ARB.  No change         Relevant Medications    Semaglutide (RYBELSUS) 14 MG TABS    pioglitazone (ACTOS) 30 MG tablet    metFORMIN (GLUCOPHAGE-XR) 500 MG 24 hr tablet    glipiZIDE (GLUCOTROL) 5 MG tablet    COVID-19 virus vaccination declined     Offered, declined         Flu vaccine need    Relevant Orders    INFLUENZA, QUAD, HIGH DOSE, PF, 65YR + (FLUZONE HD) (Completed)    Hyperlipidemia with target LDL less than 100     He has never taken his statin.  He considers his cholesterol low.  Discussed ED visit for acute MI should occur, as well as his calculated risk.         Relevant Medications    STATIN NOT PRESCRIBED (INTENTIONAL)    Hypertension goal BP (blood pressure) < 140/90     Controlled.  Continue         Morbid obesity (H)     Essentially stable weight through pandemic         Relevant Medications    Semaglutide (RYBELSUS) 14 MG TABS    pioglitazone (ACTOS) 30 MG tablet    metFORMIN (GLUCOPHAGE-XR) 500 MG 24 hr tablet    glipiZIDE (GLUCOTROL) 5 MG tablet    Pneumococcal vaccination administered at current visit    Relevant Orders    PPSV23, IM/SUBQ (2+ YRS) - [id number] (Completed)    Type 2 diabetes mellitus with stage 1 chronic kidney disease, without long-term current use of insulin (H)     Hemoglobin A1C   Date Value Ref Range Status  "  08/31/2021 7.3 (H) 0.0 - 5.6 % Final     Comment:     Normal <5.7%   Prediabetes 5.7-6.4%    Diabetes 6.5% or higher     Note: Adopted from ADA consensus guidelines.   04/28/2021 12.0 (H) 0 - 5.6 % Final     Comment:     Results confirmed by repeat test  Normal <5.7% Prediabetes 5.7-6.4%  Diabetes 6.5% or higher - adopted from ADA   consensus guidelines.     Now adequately controlled.  Discussed, continue.         Relevant Medications    Semaglutide (RYBELSUS) 14 MG TABS    pioglitazone (ACTOS) 30 MG tablet    metFORMIN (GLUCOPHAGE-XR) 500 MG 24 hr tablet    glipiZIDE (GLUCOTROL) 5 MG tablet    STATIN NOT PRESCRIBED (INTENTIONAL)    Other Relevant Orders    FOOT EXAM (Completed)      Other Visit Diagnoses     Encounter for Medicare annual wellness exam    -  Primary          Patient has been advised of split billing requirements and indicates understanding: Yes  COUNSELING:  Reviewed preventive health counseling, as reflected in patient instructions    Estimated body mass index is 36.19 kg/m  as calculated from the following:    Height as of this encounter: 1.727 m (5' 8\").    Weight as of this encounter: 108 kg (238 lb).    Weight management plan: Self-directed    He reports that he has never smoked. He has never used smokeless tobacco.      Appropriate preventive services were discussed with this patient, including applicable screening as appropriate for cardiovascular disease, diabetes, osteopenia/osteoporosis, and glaucoma.  As appropriate for age/gender, discussed screening for colorectal cancer, prostate cancer, breast cancer, and cervical cancer. Checklist reviewing preventive services available has been given to the patient.    Reviewed patients plan of care and provided an AVS. The Basic Care Plan (routine screening as documented in Health Maintenance) for Bigg meets the Care Plan requirement. This Care Plan has been established and reviewed with the Patient.    Counseling Resources:  ATP IV " Guidelines  Pooled Cohorts Equation Calculator  Breast Cancer Risk Calculator  Breast Cancer: Medication to Reduce Risk  FRAX Risk Assessment  ICSI Preventive Guidelines  Dietary Guidelines for Americans, 2010  USDA's MyPlate  ASA Prophylaxis  Lung CA Screening    Ramon Owen MD  [facility]    Identified Health Risks:   Itraconazole Counseling:  I discussed with the patient the risks of itraconazole including but not limited to liver damage, nausea/vomiting, neuropathy, and severe allergy.  The patient understands that this medication is best absorbed when taken with acidic beverages such as non-diet cola or ginger ale.  The patient understands that monitoring is required including baseline LFTs and repeat LFTs at intervals.  The patient understands that they are to contact us or the primary physician if concerning signs are noted.

## 2023-08-10 DIAGNOSIS — I10 ESSENTIAL HYPERTENSION: ICD-10-CM

## 2023-08-11 RX ORDER — LISINOPRIL 10 MG/1
10 TABLET ORAL DAILY
Qty: 90 TABLET | Refills: 0 | Status: SHIPPED | OUTPATIENT
Start: 2023-08-11 | End: 2024-03-28

## 2023-08-11 NOTE — TELEPHONE ENCOUNTER
Please call patient. He is overdue for a visit with MTM and primary care provider. Please help schedule both. 90 days sent.    Shaq Haynes PA-C on 8/11/2023 at 1:47 PM (covering for Dr. Owen)

## 2023-08-11 NOTE — TELEPHONE ENCOUNTER
Routing refill request to provider for review/approval because:  Labs out of range:  BP    BP Readings from Last 3 Encounters:   01/25/23 (!) 160/84   09/21/21 130/76   07/14/21 134/79       Maira JOHNSON RN

## 2023-08-27 ENCOUNTER — HEALTH MAINTENANCE LETTER (OUTPATIENT)
Age: 68
End: 2023-08-27

## 2023-09-29 ENCOUNTER — LAB (OUTPATIENT)
Dept: LAB | Facility: CLINIC | Age: 68
End: 2023-09-29
Payer: COMMERCIAL

## 2023-09-29 DIAGNOSIS — N18.1 TYPE 2 DIABETES MELLITUS WITH STAGE 1 CHRONIC KIDNEY DISEASE, WITHOUT LONG-TERM CURRENT USE OF INSULIN (H): Primary | ICD-10-CM

## 2023-09-29 DIAGNOSIS — E11.22 TYPE 2 DIABETES MELLITUS WITH STAGE 1 CHRONIC KIDNEY DISEASE, WITHOUT LONG-TERM CURRENT USE OF INSULIN (H): Primary | ICD-10-CM

## 2023-09-29 LAB
ALBUMIN SERPL BCG-MCNC: 4.3 G/DL (ref 3.5–5.2)
ALP SERPL-CCNC: 76 U/L (ref 40–129)
ALT SERPL W P-5'-P-CCNC: 21 U/L (ref 0–70)
ANION GAP SERPL CALCULATED.3IONS-SCNC: 12 MMOL/L (ref 7–15)
AST SERPL W P-5'-P-CCNC: 20 U/L (ref 0–45)
BILIRUB SERPL-MCNC: 0.3 MG/DL
BUN SERPL-MCNC: 12.2 MG/DL (ref 8–23)
CALCIUM SERPL-MCNC: 9.4 MG/DL (ref 8.8–10.2)
CHLORIDE SERPL-SCNC: 107 MMOL/L (ref 98–107)
CHOLEST SERPL-MCNC: 184 MG/DL
CREAT SERPL-MCNC: 0.91 MG/DL (ref 0.67–1.17)
CREAT UR-MCNC: 105 MG/DL
DEPRECATED HCO3 PLAS-SCNC: 24 MMOL/L (ref 22–29)
EGFRCR SERPLBLD CKD-EPI 2021: >90 ML/MIN/1.73M2
GLUCOSE SERPL-MCNC: 101 MG/DL (ref 70–99)
HBA1C MFR BLD: 8.7 % (ref 0–5.6)
HDLC SERPL-MCNC: 46 MG/DL
LDLC SERPL CALC-MCNC: 116 MG/DL
MICROALBUMIN UR-MCNC: 26.5 MG/L
MICROALBUMIN/CREAT UR: 25.24 MG/G CR (ref 0–17)
NONHDLC SERPL-MCNC: 138 MG/DL
POTASSIUM SERPL-SCNC: 4.4 MMOL/L (ref 3.4–5.3)
PROT SERPL-MCNC: 7.3 G/DL (ref 6.4–8.3)
SODIUM SERPL-SCNC: 143 MMOL/L (ref 135–145)
TRIGL SERPL-MCNC: 112 MG/DL

## 2023-09-29 PROCEDURE — 80053 COMPREHEN METABOLIC PANEL: CPT

## 2023-09-29 PROCEDURE — 82570 ASSAY OF URINE CREATININE: CPT

## 2023-09-29 PROCEDURE — 80061 LIPID PANEL: CPT

## 2023-09-29 PROCEDURE — 82043 UR ALBUMIN QUANTITATIVE: CPT

## 2023-09-29 PROCEDURE — 36415 COLL VENOUS BLD VENIPUNCTURE: CPT

## 2023-09-29 PROCEDURE — 83036 HEMOGLOBIN GLYCOSYLATED A1C: CPT

## 2023-10-05 NOTE — RESULT ENCOUNTER NOTE
Diabetes needs better control.. I see that your appointment with Ileana was cancelled. She is an excellent resource. We will help you reschedule   Ramon Owen MD

## 2023-10-30 ENCOUNTER — ALLIED HEALTH/NURSE VISIT (OUTPATIENT)
Dept: PHARMACY | Facility: CLINIC | Age: 68
End: 2023-10-30
Payer: COMMERCIAL

## 2023-10-30 VITALS — BODY MASS INDEX: 36.71 KG/M2 | DIASTOLIC BLOOD PRESSURE: 78 MMHG | WEIGHT: 245 LBS | SYSTOLIC BLOOD PRESSURE: 144 MMHG

## 2023-10-30 DIAGNOSIS — E78.5 HYPERLIPIDEMIA WITH TARGET LDL LESS THAN 100: ICD-10-CM

## 2023-10-30 DIAGNOSIS — Z78.9 TAKES DIETARY SUPPLEMENTS: ICD-10-CM

## 2023-10-30 DIAGNOSIS — N18.1 TYPE 2 DIABETES MELLITUS WITH STAGE 1 CHRONIC KIDNEY DISEASE, WITHOUT LONG-TERM CURRENT USE OF INSULIN (H): Primary | ICD-10-CM

## 2023-10-30 DIAGNOSIS — I10 ESSENTIAL HYPERTENSION: ICD-10-CM

## 2023-10-30 DIAGNOSIS — E11.22 TYPE 2 DIABETES MELLITUS WITH STAGE 1 CHRONIC KIDNEY DISEASE, WITHOUT LONG-TERM CURRENT USE OF INSULIN (H): Primary | ICD-10-CM

## 2023-10-30 PROCEDURE — 99605 MTMS BY PHARM NP 15 MIN: CPT | Performed by: PHARMACIST

## 2023-10-30 PROCEDURE — 99607 MTMS BY PHARM ADDL 15 MIN: CPT | Performed by: PHARMACIST

## 2023-10-30 RX ORDER — METFORMIN HCL 500 MG
1000 TABLET, EXTENDED RELEASE 24 HR ORAL 2 TIMES DAILY WITH MEALS
Qty: 360 TABLET | Refills: 0 | Status: SHIPPED | OUTPATIENT
Start: 2023-10-30 | End: 2024-03-28

## 2023-10-30 RX ORDER — GLIPIZIDE 5 MG/1
5 TABLET ORAL
Qty: 180 TABLET | Refills: 0 | Status: SHIPPED | OUTPATIENT
Start: 2023-10-30 | End: 2024-03-28

## 2023-10-30 RX ORDER — PIOGLITAZONEHYDROCHLORIDE 30 MG/1
30 TABLET ORAL DAILY
Qty: 90 TABLET | Refills: 0 | Status: SHIPPED | OUTPATIENT
Start: 2023-10-30 | End: 2024-03-28

## 2023-10-30 NOTE — PATIENT INSTRUCTIONS
"Recommendations from today's MTM visit:                                                         Increase Jardiance to 25 mg daily.  Blood glucose goals: fasting in the morning should be  mg/dL and 2-3 hours after a meal should be less than 180 mg/dL  Check Ozempic on your insurance formulary       It was great speaking with you today.  I value your experience and would be very thankful for your time in providing feedback in our clinic survey. In the next few days, you may receive an email or text message from [facility] [employer] with a link to a survey related to your  clinical pharmacist.\"     To schedule another MTM appointment, please call the clinic directly or you may call the MTM scheduling line at [phone] or toll-free at [phone].     My Clinical Pharmacist's contact information:                                                      Please feel free to contact me with any questions or concerns you have.      Ileana Ibarra, PharmD, BCACP  Medication Therapy Management Provider, [facility]  "

## 2023-10-30 NOTE — LETTER
_  Medication List        Prepared on: 10/30/2023     Bring your Medication List when you go to the doctor, hospital, or   emergency room. And, share it with your family or caregivers.     Note any changes to how you take your medications.  Cross out medications when you no longer use them.    Medication How I take it Why I use it Prescriber   aspirin 81 MG tablet Take 81 mg by mouth daily  General Health    Patient Reported   Cholecalciferol (VITAMIN D3) 50 MCG (2000 UT) TABS Take 2,000 Units by mouth daily Encounter for preventive measure Ramon Owen MD   cyanocolbalamin (VITAMIN  B-12) 500 MCG tablet Take 500 mcg by mouth daily  General Health Patient Reported   empagliflozin (JARDIANCE) 25 MG TABS tablet Take 1 tablet (25 mg) by mouth daily Type 2 diabetes mellitus with stage 1 chronic kidney disease, without long-term current use of insulin (H) Ramon Owen MD   glipiZIDE (GLUCOTROL) 5 MG tablet Take 1 tablet (5 mg) by mouth 2 times daily (before meals) Type 2 diabetes mellitus with stage 1 chronic kidney disease, without long-term current use of insulin (H) Ramon Owen MD   lisinopril (ZESTRIL) 10 MG tablet Take 1 tablet (10 mg) by mouth daily Essential Hypertension Shaq Haynes PA-C   metFORMIN (GLUCOPHAGE XR) 500 MG 24 hr tablet Take 2 tablets (1,000 mg) by mouth 2 times daily (with meals) Type 2 diabetes mellitus with stage 1 chronic kidney disease, without long-term current use of insulin (H) Ramon Owen MD   pioglitazone (ACTOS) 30 MG tablet Take 1 tablet (30 mg) by mouth daily Type 2 diabetes mellitus with stage 1 chronic kidney disease, without long-term current use of insulin (H) Ramon Owen MD         Add new medications, over-the-counter drugs, herbals, vitamins, or  minerals in the blank rows below.    Medication How I take it Why I use it Prescriber                                      Allergies:      no known allergies        Side effects I have had:               Other  Information:              My notes and questions:

## 2023-10-30 NOTE — PROGRESS NOTES
Medication Therapy Management (MTM) Encounter    ASSESSMENT:                            Medication Adherence/Access: No issues identified    Diabetes:   Patient is not meeting A1c goal of < 8%. Self monitoring of blood glucose is not at goal of fasting  mg/dL. Recommend increase Jardiance dose, BMP in 1 month. Also recommend addition of GLP-1 agonist especially to be able to decrease glipizide and/or pioglitazone doses.  He would like to consider this addition on f/up next visit.     Hypertension:   Patient is not meeting blood pressure goal of < 140/90mmHg. Increasing Jardiance dose as above should aid in lower blood pressure further. If still not at goal in 1 month, recommend increase lisinopril dose.    Hyperlipidemia:   Patient is not on moderate intensity statin which is indicated based on 2019 ACC/AHA guidelines for lipid management.  He still declines statin therapy at this time.     Supplements:   stable    PLAN:                            Increase Jardiance to 25 mg daily. BMP in 1 month.  Consider starting Ozempic 0.25 mg weekly for 4 weeks then increase to 0.5 mg weekly.     Follow-up: Return in about 1 month (around 11/30/2023) for Lab Work, Medication Therapy Management Pharmacist.    SUBJECTIVE/OBJECTIVE:                          Josr Lilly is a 68 year old male coming in for a follow-up visit from 8/23/22.       Reason for visit: blood glucose and blood pressure review.    Allergies/ADRs: None  Past Medical History: Reviewed in chart  Tobacco: He reports that he has never smoked. He has never used smokeless tobacco.  Alcohol: 4-6 beverages / week    Medication Adherence/Access: no issues reported now, he states he was non-adherent to lisinopril this summer but has restarted taking every day now for past couple weeks    Diabetes   Jardiance 10 mg daily (restarted this summer, covered now with Medicare)  glipizide 5mg twice daily  metformin XR 1000mg twice daily  pioglitazone 30mg daily  Aspirin  81mg daily    Rybelsus too expensive. Patient is not experiencing side effects. He would consider a once weekly injectable if able to get rid of his other diabetes medications. He does like Jardiance, feels this works best.    Blood sugar monitorin time(s) daily; Ranges: (patient reported) Fasting- 140-150's   Current diabetes symptoms: none  Diet/Exercise: Best results for him is if he can stop eating around 5:30-6pm. Less exercise now with winter.      Eye exam in the last 12 months? No    Foot exam is up to date  Urine Albumin:   Lab Results   Component Value Date    UMALCR 25.24 (H) 2023      Lab Results   Component Value Date    A1C 8.7 (H) 2023     Lab Results   Component Value Date    A1C 8.7 2023    A1C 7.8 2022    A1C 7.8 2022    A1C 9.2 2022    A1C 7.3 2021    A1C 12.0 2021    A1C 9.7 2020    A1C 9.2 2020    A1C 8.8 2019    A1C 10.1 10/30/2019       Hypertension     Lisinopril 10 mg daily (consistent now, wasn't adherent in the summer per his report but is now)    Patient reports no current medication side effects  Patient does not self-monitor blood pressure.       BP Readings from Last 3 Encounters:   10/30/23 (!) 144/78   23 (!) 160/84   21 130/76     Pulse Readings from Last 3 Encounters:   23 76   21 74   21 73     Hyperlipidemia     no current medications  Declines statin therapy.   The 10-year ASCVD risk score (Flor KNIGHT, et al., 2019) is: 35.7%    Values used to calculate the score:      Age: 68 years      Sex: Male      Is Non- : No      Diabetic: Yes      Tobacco smoker: No      Systolic Blood Pressure: 144 mmHg      Is BP treated: Yes      HDL Cholesterol: 46 mg/dL      Total Cholesterol: 184 mg/dL       Recent Labs   Lab Test 23  0827 22  0830 16  1112 11/02/15  0830   CHOL 184 237*   < > 176   HDL 46 48   < > 40*   * 159*   < > 113   TRIG 112 151*    < > 117   CHOLHDLRATIO  --   --   --  4.4    < > = values in this interval not displayed.       Supplements:   Vitamin B12 500 mcg daily  Vitamin D 50 mcg daily    Today's Vitals: BP (!) 144/78   Wt 245 lb (111.1 kg)   BMI 36.71 kg/m    ----------------      I spent 30 minutes with this patient today. All changes were made via collaborative practice agreement with Ramon Owen MD. A copy of the visit note was provided to the patient's provider(s).    A summary of these recommendations was given to the patient.    Ileana Ibarra, PharmD, [facility]  Medication Therapy Management Provider, [facility]  Pager: [phone]       Medication Therapy Recommendations  Type 2 diabetes mellitus with stage 1 chronic kidney disease, without long-term current use of insulin (H)    Current Medication: empagliflozin (JARDIANCE) 10 MG TABS tablet (Discontinued)   Rationale: Dose too low - Dosage too low - Effectiveness   Recommendation: Increase Dose - Jardiance 25 MG Tabs   Status: Accepted per CPA          Rationale: Synergistic therapy - Needs additional medication therapy - Indication   Recommendation: Start Medication - Ozempic (0.25 or 0.5 MG/DOSE) 2 MG/1.5ML Sopn   Status: Declined per Patient

## 2023-10-30 NOTE — LETTER
"Recommended To-Do List      Prepared on: 10/30/2023       You can get the best results from your medications by completing the items on this \"To-Do List.\"      Bring your To-Do List when you go to your doctor. And, share it with your family or caregivers.    My To-Do List:  What we talked about: What I should do:   Your medication dosage being too low    Increase your dosage of empagliflozin (Jardiance)          What we talked about: What I should do:                     "

## 2023-10-30 NOTE — LETTER
October 30, 2023  Bigg MARIO Lilly  [address] [address]    Dear DAVIN Zambrano [facility]     Thank you for talking with me on Oct 30, 2023 about your health and medications. As a follow-up to our conversation, I have included two documents:      Your Recommended To-Do List has steps you should take to get the best results from your medications.  Your Medication List will help you keep track of your medications and how to take them.    If you want to talk about these documents, please call Ileana Ibarra RPH at phone: [phone], Monday-Friday 8-4:30pm.    I look forward to working with you and your doctors to make sure your medications work well for you.    Sincerely,  Ileana Ibarra RPH  [facility] Pharmacist, [facility]

## 2023-11-13 ENCOUNTER — TELEPHONE (OUTPATIENT)
Dept: FAMILY MEDICINE | Facility: CLINIC | Age: 68
End: 2023-11-13
Payer: COMMERCIAL

## 2023-11-13 NOTE — TELEPHONE ENCOUNTER
Patient Quality Outreach    Patient is due for the following:   Diabetes -  Eye Exam    Next Steps:   No follow up needed at this time.    Type of outreach:    Chart review performed, no outreach needed. and patient has been seen with [facility] on 11- No Diabetic retinopathy or macular edema      Questions for provider review:    None           Johana Parnell MA

## 2024-01-04 ENCOUNTER — PATIENT OUTREACH (OUTPATIENT)
Dept: CARE COORDINATION | Facility: CLINIC | Age: 69
End: 2024-01-04
Payer: COMMERCIAL

## 2024-01-14 ENCOUNTER — HEALTH MAINTENANCE LETTER (OUTPATIENT)
Age: 69
End: 2024-01-14

## 2024-01-18 ENCOUNTER — PATIENT OUTREACH (OUTPATIENT)
Dept: CARE COORDINATION | Facility: CLINIC | Age: 69
End: 2024-01-18
Payer: COMMERCIAL

## 2024-03-18 ENCOUNTER — LAB (OUTPATIENT)
Dept: LAB | Facility: CLINIC | Age: 69
End: 2024-03-18
Payer: COMMERCIAL

## 2024-03-18 DIAGNOSIS — E78.5 HYPERLIPIDEMIA WITH TARGET LDL LESS THAN 100: ICD-10-CM

## 2024-03-18 DIAGNOSIS — N18.1 TYPE 2 DIABETES MELLITUS WITH STAGE 1 CHRONIC KIDNEY DISEASE, WITHOUT LONG-TERM CURRENT USE OF INSULIN (H): ICD-10-CM

## 2024-03-18 DIAGNOSIS — E11.22 TYPE 2 DIABETES MELLITUS WITH STAGE 1 CHRONIC KIDNEY DISEASE, WITHOUT LONG-TERM CURRENT USE OF INSULIN (H): ICD-10-CM

## 2024-03-18 LAB
ALBUMIN SERPL BCG-MCNC: 4.5 G/DL (ref 3.5–5.2)
ALP SERPL-CCNC: 79 U/L (ref 40–150)
ALT SERPL W P-5'-P-CCNC: 19 U/L (ref 0–70)
ANION GAP SERPL CALCULATED.3IONS-SCNC: 12 MMOL/L (ref 7–15)
AST SERPL W P-5'-P-CCNC: 18 U/L (ref 0–45)
BILIRUB SERPL-MCNC: 0.6 MG/DL
BUN SERPL-MCNC: 17 MG/DL (ref 8–23)
CALCIUM SERPL-MCNC: 9.5 MG/DL (ref 8.8–10.2)
CHLORIDE SERPL-SCNC: 104 MMOL/L (ref 98–107)
CHOLEST SERPL-MCNC: 231 MG/DL
CREAT SERPL-MCNC: 1.01 MG/DL (ref 0.67–1.17)
DEPRECATED HCO3 PLAS-SCNC: 26 MMOL/L (ref 22–29)
EGFRCR SERPLBLD CKD-EPI 2021: 82 ML/MIN/1.73M2
FASTING STATUS PATIENT QL REPORTED: YES
GLUCOSE SERPL-MCNC: 137 MG/DL (ref 70–99)
HBA1C MFR BLD: 8.5 % (ref 0–5.6)
HDLC SERPL-MCNC: 44 MG/DL
LDLC SERPL CALC-MCNC: 144 MG/DL
NONHDLC SERPL-MCNC: 187 MG/DL
POTASSIUM SERPL-SCNC: 4.6 MMOL/L (ref 3.4–5.3)
PROT SERPL-MCNC: 7.7 G/DL (ref 6.4–8.3)
SODIUM SERPL-SCNC: 142 MMOL/L (ref 135–145)
TRIGL SERPL-MCNC: 213 MG/DL

## 2024-03-18 PROCEDURE — 36415 COLL VENOUS BLD VENIPUNCTURE: CPT

## 2024-03-18 PROCEDURE — 80061 LIPID PANEL: CPT

## 2024-03-18 PROCEDURE — 80053 COMPREHEN METABOLIC PANEL: CPT

## 2024-03-18 PROCEDURE — 83036 HEMOGLOBIN GLYCOSYLATED A1C: CPT

## 2024-03-24 ENCOUNTER — HEALTH MAINTENANCE LETTER (OUTPATIENT)
Age: 69
End: 2024-03-24

## 2024-03-28 ENCOUNTER — OFFICE VISIT (OUTPATIENT)
Dept: PHARMACY | Facility: CLINIC | Age: 69
End: 2024-03-28
Payer: COMMERCIAL

## 2024-03-28 ENCOUNTER — TELEPHONE (OUTPATIENT)
Dept: FAMILY MEDICINE | Facility: CLINIC | Age: 69
End: 2024-03-28
Payer: COMMERCIAL

## 2024-03-28 VITALS — BODY MASS INDEX: 37.62 KG/M2 | WEIGHT: 251.1 LBS | DIASTOLIC BLOOD PRESSURE: 92 MMHG | SYSTOLIC BLOOD PRESSURE: 154 MMHG

## 2024-03-28 DIAGNOSIS — E11.22 TYPE 2 DIABETES MELLITUS WITH STAGE 1 CHRONIC KIDNEY DISEASE, WITHOUT LONG-TERM CURRENT USE OF INSULIN (H): Primary | ICD-10-CM

## 2024-03-28 DIAGNOSIS — N18.1 TYPE 2 DIABETES MELLITUS WITH STAGE 1 CHRONIC KIDNEY DISEASE, WITHOUT LONG-TERM CURRENT USE OF INSULIN (H): Primary | ICD-10-CM

## 2024-03-28 DIAGNOSIS — I10 ESSENTIAL HYPERTENSION: ICD-10-CM

## 2024-03-28 DIAGNOSIS — E66.01 MORBID OBESITY (H): ICD-10-CM

## 2024-03-28 DIAGNOSIS — Z78.9 TAKES DIETARY SUPPLEMENTS: ICD-10-CM

## 2024-03-28 DIAGNOSIS — E78.5 HYPERLIPIDEMIA WITH TARGET LDL LESS THAN 100: ICD-10-CM

## 2024-03-28 PROCEDURE — 99605 MTMS BY PHARM NP 15 MIN: CPT | Performed by: PHARMACIST

## 2024-03-28 PROCEDURE — 99607 MTMS BY PHARM ADDL 15 MIN: CPT | Performed by: PHARMACIST

## 2024-03-28 RX ORDER — PIOGLITAZONEHYDROCHLORIDE 30 MG/1
30 TABLET ORAL DAILY
Qty: 90 TABLET | Refills: 0 | Status: SHIPPED | OUTPATIENT
Start: 2024-03-28

## 2024-03-28 RX ORDER — LISINOPRIL 10 MG/1
10 TABLET ORAL DAILY
Qty: 90 TABLET | Refills: 0 | Status: SHIPPED | OUTPATIENT
Start: 2024-03-28

## 2024-03-28 RX ORDER — METFORMIN HCL 500 MG
1000 TABLET, EXTENDED RELEASE 24 HR ORAL 2 TIMES DAILY WITH MEALS
Qty: 360 TABLET | Refills: 0 | Status: SHIPPED | OUTPATIENT
Start: 2024-03-28

## 2024-03-28 RX ORDER — METFORMIN HCL 500 MG
1000 TABLET, EXTENDED RELEASE 24 HR ORAL 2 TIMES DAILY WITH MEALS
Qty: 360 TABLET | Refills: 0 | Status: SHIPPED | OUTPATIENT
Start: 2024-03-28 | End: 2024-03-28

## 2024-03-28 RX ORDER — GLIPIZIDE 5 MG/1
5 TABLET ORAL
Qty: 180 TABLET | Refills: 0 | Status: SHIPPED | OUTPATIENT
Start: 2024-03-28 | End: 2024-03-28

## 2024-03-28 RX ORDER — GLIPIZIDE 5 MG/1
5 TABLET ORAL
Qty: 180 TABLET | Refills: 0 | Status: SHIPPED | OUTPATIENT
Start: 2024-03-28 | End: 2024-07-19

## 2024-03-28 RX ORDER — LISINOPRIL 10 MG/1
10 TABLET ORAL DAILY
Qty: 90 TABLET | Refills: 0 | Status: SHIPPED | OUTPATIENT
Start: 2024-03-28 | End: 2024-03-28

## 2024-03-28 RX ORDER — PIOGLITAZONEHYDROCHLORIDE 30 MG/1
30 TABLET ORAL DAILY
Qty: 90 TABLET | Refills: 0 | Status: SHIPPED | OUTPATIENT
Start: 2024-03-28 | End: 2024-03-28

## 2024-03-28 NOTE — PATIENT INSTRUCTIONS
"Recommendations from today's MTM visit:                                                       Take all your medications every day. Take lisinopril every day.    If Ozempic is covered, will start 0.25 mg weekly for 4 weeks then increase to 0.5 mg weekly.     It was great speaking with you today.  I value your experience and would be very thankful for your time in providing feedback in our clinic survey. In the next few days, you may receive an email or text message from [facility] with a link to a survey related to your  clinical pharmacist.\"     To schedule another MTM appointment, please call the clinic directly or you may call the MTM scheduling line at [phone] or toll-free at [phone].     My Clinical Pharmacist's contact information:                                                      Please feel free to contact me with any questions or concerns you have.      Ileana Ibarra, PharmD, [facility]  Medication Therapy Management Provider, [facility]    "

## 2024-03-28 NOTE — LETTER
"Recommended To-Do List      Prepared on: 03/28/2024       You can get the best results from your medications by completing the items on this \"To-Do List.\"      Bring your To-Do List when you go to your doctor. And, share it with your family or caregivers.    My To-Do List:  What we talked about: What I should do:   The importance of taking your medication as intended    Reminder to take your medication as prescribed for lisinopril (ZESTRIL)          What we talked about: What I should do:   A medication that is not working    Change the medication you are taking from pioglitazone (Actos) to Ozempic (0.25 or 0.5 MG/DOSE) if covered          What we talked about: What I should do:                     "

## 2024-03-28 NOTE — PROGRESS NOTES
Medication Therapy Management (MTM) Encounter    ASSESSMENT:                            Medication Adherence/Access: See below for considerations    Diabetes/Obesity:   Patient is not meeting A1c goal of < 8%.  Self monitoring of blood glucose is not at goal of fasting  mg/dL. Recommend GLP1 agonist therapy if covered instead of pioglitazone which would also aid in weight loss. Discussed lifestyle changes and will recheck A1c in 3 months per his preference to minimize additional medications. Discussed with him if A1c not improved, will need to increase glipizide or pioglitazone doses and/or consider CGM.    Hypertension:   Patient is not meeting blood pressure goal of < 130/80mmHg. Discussed improving adherence to lisinopril and putting in his pillbox with all other pills.     Hyperlipidemia:   Patient is not willing to start medication at this time and wants to work on lifestyle changes. Recheck in 3 months.     Supplements:   stable    Immunizations:   He declines all vaccinations.   PLAN:                              If Ozempic is covered, will start 0.25 mg weekly for 4 weeks then increase to 0.5 mg weekly. Reviewed appropriate use, benefits, risks and monitoring at length.    Discussed improving adherence and taking lisinopril 10 mg every day.     Follow-up: Return in about 3 months (around 6/28/2024) for Lab Work, primary care doctor visit.    SUBJECTIVE/OBJECTIVE:                          Josr Lilly is a 68 year old male coming in for a follow-up visit.       Reason for visit: elevated blood pressure and blood glucose.    Allergies/ADRs: None  Past Medical History: Reviewed in chart  Tobacco: He reports that he has never smoked. He has never used smokeless tobacco.  Alcohol: 4-6 beverages / week but more past month due to vacation    Medication Adherence/Access: ran out of medications for last few weeks but otherwise reports his adherence has improved compared to previous and is taking medications every  day, has not been adherent to lisinopril however. Uses pillbox for other medications except lisinopril.    Diabetes /Obesity  Jardiance 25 mg daily  glipizide 5mg twice daily  metformin XR 1000mg twice daily  pioglitazone 30mg daily - most recent addition  Aspirin 81mg daily    He would like to work on diet first before more medications or CGM. Rybelsus too expensive he thinks but did change Medicare coverage this year for better plan. Patient is not experiencing side effects. He would consider a once weekly injectable if able to get rid of his other diabetes medications. He does like Jardiance, feels this works best and affordable.    Blood sugar monitorin time(s) daily; Ranges: (patient reported) Fasting- 130-170's  Current diabetes symptoms: none  Diet/Exercise: Stops eating after 6pm dinner. Breakfast later in the mornings. Has not been exercising. Wife retiring so forcing walks now. He hasn't been good with diet and now trying to improve and less carbs/sweets.        Eye exam is up to date  Foot exam: due  Urine Albumin:   Lab Results   Component Value Date    UMALCR 25.24 (H) 2023      Lab Results   Component Value Date    A1C 8.5 (H) 2024       Hypertension   Lisinopril 10 mg daily (was off and on b/c forgot to take but now will put together in same pill box as other pills)    Patient reports no current medication side effects  Patient does self-monitor blood pressure with daughter who is an RN once a in while - last was 138/86 about a month ago.  Had chinese food recently.        BP Readings from Last 3 Encounters:   24 (!) 154/92   10/30/23 (!) 144/78   23 (!) 160/84     Pulse Readings from Last 3 Encounters:   23 76   21 74   21 73       Hyperlipidemia   no current medications  Declines statin therapy.   The 10-year ASCVD risk score (Flor KNIGHT, et al., 2019) is: 44.7%    Values used to calculate the score:      Age: 68 years      Sex: Male      Is Non-  : No      Diabetic: Yes      Tobacco smoker: No      Systolic Blood Pressure: 154 mmHg      Is BP treated: Yes      HDL Cholesterol: 44 mg/dL      Total Cholesterol: 231 mg/dL       Recent Labs   Lab Test 03/18/24  0956 09/29/23  0827   CHOL 231* 184   HDL 44 46   * 116*   TRIG 213* 112         Supplements:   Vitamin B12 500 mcg daily  Vitamin D 50 mcg daily    No side effects or concerns.     Most Recent Immunizations   Administered Date(s) Administered    COVID-19 Vaccine (Nato) 11/11/2021    Influenza Vaccine 18-64 (Flublok) 09/16/2020    Influenza Vaccine 65+ (Fluzone HD) 12/29/2022    Pneumococcal 23 valent 09/21/2021    TD,PF 7+ (Tenivac) 04/14/2000    TDAP Vaccine (Boostrix) 04/23/2014    Zoster recombinant adjuvanted (SHINGRIX) 11/17/2020       Today's Vitals: BP (!) 154/92   Wt 251 lb 1.6 oz (113.9 kg)   BMI 37.62 kg/m    ----------------      I spent 30 minutes with this patient today. All changes were made via collaborative practice agreement with Ramon Owen MD. A copy of the visit note was provided to the patient's provider(s).    A summary of these recommendations was given to the patient.    Ileana Ibarra, PharmD, [facility]  Medication Therapy Management Provider, [facility]  [phone]         Medication Therapy Recommendations  Essential hypertension    Current Medication: lisinopril (ZESTRIL) 10 MG tablet   Rationale: Patient forgets to take - Adherence - Adherence   Recommendation: Provide Adherence Intervention - lisinopril 10 MG tablet   Status: Patient Agreed - Adherence/Education         Type 2 diabetes mellitus with stage 1 chronic kidney disease, without long-term current use of insulin (H)    Current Medication: pioglitazone (ACTOS) 30 MG tablet   Rationale: More effective medication available - Ineffective medication - Effectiveness   Recommendation: Change Medication - Ozempic (0.25 or 0.5 MG/DOSE) 2 MG/1.5ML Sopn   Status: Accepted  per CPA

## 2024-03-28 NOTE — LETTER
_  Medication List        Prepared on: 03/28/2024     Bring your Medication List when you go to the doctor, hospital, or   emergency room. And, share it with your family or caregivers.     Note any changes to how you take your medications.  Cross out medications when you no longer use them.    Medication How I take it Why I use it Prescriber   aspirin 81 MG tablet Take 81 mg by mouth daily  Type 2 diabetes mellitus with stage 1 chronic kidney disease, without long-term current use of insulin (H) Patient Reported   Cholecalciferol (VITAMIN D3) 50 MCG (2000 UT) TABS Take 2,000 Units by mouth daily Encounter for preventive measure Ramon Owen MD   cyanocolbalamin (VITAMIN  B-12) 500 MCG tablet Take 500 mcg by mouth daily  General Health Patient Reported   empagliflozin (JARDIANCE) 25 MG TABS tablet Take 1 tablet (25 mg) by mouth daily Type 2 diabetes mellitus with stage 1 chronic kidney disease, without long-term current use of insulin (H) Ramon Owen MD   glipiZIDE (GLUCOTROL) 5 MG tablet Take 1 tablet (5 mg) by mouth 2 times daily (before meals) Type 2 diabetes mellitus with stage 1 chronic kidney disease, without long-term current use of insulin (H) Ramon Owen MD   lisinopril (ZESTRIL) 10 MG tablet Take 1 tablet (10 mg) by mouth daily Essential Hypertension Ramon Owen MD   metFORMIN (GLUCOPHAGE XR) 500 MG 24 hr tablet Take 2 tablets (1,000 mg) by mouth 2 times daily (with meals) Type 2 diabetes mellitus with stage 1 chronic kidney disease, without long-term current use of insulin (H) Ramon Owen MD   pioglitazone (ACTOS) 30 MG tablet Take 1 tablet (30 mg) by mouth daily Type 2 diabetes mellitus with stage 1 chronic kidney disease, without long-term current use of insulin (H) Ramon Owen MD   semaglutide (OZEMPIC) 2 MG/3ML pen Inject 0.25 mg Subcutaneous every 7 days for 28 days, THEN 0.5 mg every 7 days. Type 2 diabetes mellitus with stage 1 chronic kidney disease, without long-term current use  of insulin (H) Ramon Owen MD         Add new medications, over-the-counter drugs, herbals, vitamins, or  minerals in the blank rows below.    Medication How I take it Why I use it Prescriber                                      Allergies:      no known allergies        Side effects I have had:               Other Information:              My notes and questions:

## 2024-03-28 NOTE — TELEPHONE ENCOUNTER
PA Initiation    Medication: OZEMPIC (0.25 OR 0.5 MG/DOSE) [facility]  Insurance Company: [employer] - Phone [phone] Fax [phone]  Pharmacy Filling the Rx:    Filling Pharmacy Phone:    Filling Pharmacy Fax:    Start Date: 3/28/2024    Key:[id number]

## 2024-03-28 NOTE — LETTER
March 28, 2024  Bigg MARIO Lilly  [address] [address]    Dear Mr. Lilly, [facility]     Thank you for talking with me on Mar 28, 2024 about your health and medications. As a follow-up to our conversation, I have included two documents:      Your Recommended To-Do List has steps you should take to get the best results from your medications.  Your Medication List will help you keep track of your medications and how to take them.    If you want to talk about these documents, please call Ileana Ibarra RPH at phone: [phone], Monday-Friday 8-4:30pm.    I look forward to working with you and your doctors to make sure your medications work well for you.    Sincerely,  Ileana Ibarra RPH  [facility] Pharmacist, [facility]

## 2024-03-29 NOTE — TELEPHONE ENCOUNTER
Prior Authorization Approval    Medication: OZEMPIC (0.25 OR 0.5 MG/DOSE) 2 MG/3ML SC SOPN  Authorization Effective Date: 1/1/2024  Authorization Expiration Date: 3/28/2025  Approved Dose/Quantity: 3ml/28 days   Reference #: [id number]   Insurance Company: [employer] - Phone [phone] Fax [phone]  Expected CoPay: $    CoPay Card Available:      Financial Assistance Needed: no  Which Pharmacy is filling the prescription:    Pharmacy Notified: no  Patient Notified: pharmacy notified patient

## 2024-06-17 PROBLEM — Z71.89 ADVANCED DIRECTIVES, COUNSELING/DISCUSSION: Status: RESOLVED | Noted: 2023-01-25 | Resolved: 2024-06-17

## 2024-07-19 DIAGNOSIS — E11.22 TYPE 2 DIABETES MELLITUS WITH STAGE 1 CHRONIC KIDNEY DISEASE, WITHOUT LONG-TERM CURRENT USE OF INSULIN (H): ICD-10-CM

## 2024-07-19 DIAGNOSIS — N18.1 TYPE 2 DIABETES MELLITUS WITH STAGE 1 CHRONIC KIDNEY DISEASE, WITHOUT LONG-TERM CURRENT USE OF INSULIN (H): ICD-10-CM

## 2024-07-19 RX ORDER — EMPAGLIFLOZIN 25 MG/1
25 TABLET, FILM COATED ORAL DAILY
Qty: 90 TABLET | Refills: 0 | Status: SHIPPED | OUTPATIENT
Start: 2024-07-19

## 2024-07-19 RX ORDER — GLIPIZIDE 5 MG/1
TABLET ORAL
Qty: 180 TABLET | Refills: 0 | Status: SHIPPED | OUTPATIENT
Start: 2024-07-19

## 2024-07-19 NOTE — LETTER
July 22, 2024      Bigg Lilly  [address] [place] [address]      Dear Bigg,    We recently received a call from your pharmacy requesting a refill of your medication.    A review of your chart indicates that an appointment is required with to establish care with a new provider.  Please call the clinic to schedule your appointment.    We have authorized one refill of your medication to allow time for you to schedule.   If you have a history of diabetes or high cholesterol, please come in fasting for the appointment. Fasting entails nothing to eat or drink 8 hours prior to your appointment; with the exception on water. You may take your medication the day of the appointment.    Thank you,      [facility]

## 2024-07-19 NOTE — TELEPHONE ENCOUNTER
Please call patient. Sending 90 days but he is due for a visit with a new primary care provider and/or Ileana Ibarra. Please help schedule.    Shaq Haynes PA-C on 7/19/2024 at 10:53 AM (covering for Dr. Owen)

## 2024-07-23 ENCOUNTER — PATIENT OUTREACH (OUTPATIENT)
Dept: CARE COORDINATION | Facility: CLINIC | Age: 69
End: 2024-07-23
Payer: COMMERCIAL

## 2024-08-11 ENCOUNTER — HEALTH MAINTENANCE LETTER (OUTPATIENT)
Age: 69
End: 2024-08-11

## 2024-10-09 ENCOUNTER — TELEPHONE (OUTPATIENT)
Dept: FAMILY MEDICINE | Facility: CLINIC | Age: 69
End: 2024-10-09

## 2024-10-09 NOTE — TELEPHONE ENCOUNTER
Left VM for Pt to call back.     If Pt calls back, please help Est Care with a provider and schedule AWV for at least 30 days out and close this encounter.     Abdiel Espinoza

## 2024-11-11 NOTE — TELEPHONE ENCOUNTER
Call attempt 2/3.    LVM for patient to schedule annual wellness exam due Now. On callback, please assist patient in scheduling Medicare AWV.    Please close encounter when scheduled.      Abdiel Espinoza

## 2024-12-22 ENCOUNTER — HEALTH MAINTENANCE LETTER (OUTPATIENT)
Age: 69
End: 2024-12-22

## 2025-01-02 SDOH — HEALTH STABILITY: PHYSICAL HEALTH: ON AVERAGE, HOW MANY DAYS PER WEEK DO YOU ENGAGE IN MODERATE TO STRENUOUS EXERCISE (LIKE A BRISK WALK)?: 1 DAY

## 2025-01-02 SDOH — HEALTH STABILITY: PHYSICAL HEALTH: ON AVERAGE, HOW MANY MINUTES DO YOU ENGAGE IN EXERCISE AT THIS LEVEL?: 30 MIN

## 2025-01-02 ASSESSMENT — SOCIAL DETERMINANTS OF HEALTH (SDOH): HOW OFTEN DO YOU GET TOGETHER WITH FRIENDS OR RELATIVES?: TWICE A WEEK

## 2025-01-07 ENCOUNTER — OFFICE VISIT (OUTPATIENT)
Dept: INTERNAL MEDICINE | Facility: CLINIC | Age: 70
End: 2025-01-07
Payer: COMMERCIAL

## 2025-01-07 VITALS
DIASTOLIC BLOOD PRESSURE: 74 MMHG | HEIGHT: 68 IN | OXYGEN SATURATION: 99 % | BODY MASS INDEX: 34.8 KG/M2 | RESPIRATION RATE: 16 BRPM | SYSTOLIC BLOOD PRESSURE: 132 MMHG | TEMPERATURE: 97.5 F | WEIGHT: 229.6 LBS | HEART RATE: 83 BPM

## 2025-01-07 DIAGNOSIS — E66.01 MORBID OBESITY (H): ICD-10-CM

## 2025-01-07 DIAGNOSIS — E11.22 CKD STAGE 1 DUE TO TYPE 2 DIABETES MELLITUS (H): ICD-10-CM

## 2025-01-07 DIAGNOSIS — Z00.00 ENCOUNTER FOR PREVENTIVE HEALTH EXAMINATION: Primary | ICD-10-CM

## 2025-01-07 DIAGNOSIS — Z82.49 FAMILY HISTORY OF ISCHEMIC HEART DISEASE: ICD-10-CM

## 2025-01-07 DIAGNOSIS — Z82.49 FAMILY HISTORY OF CEREBRAL ANEURYSM: ICD-10-CM

## 2025-01-07 DIAGNOSIS — E78.5 HYPERLIPIDEMIA WITH TARGET LDL LESS THAN 100: ICD-10-CM

## 2025-01-07 DIAGNOSIS — N18.1 CKD STAGE 1 DUE TO TYPE 2 DIABETES MELLITUS (H): ICD-10-CM

## 2025-01-07 DIAGNOSIS — N18.1 TYPE 2 DIABETES MELLITUS WITH STAGE 1 CHRONIC KIDNEY DISEASE, WITHOUT LONG-TERM CURRENT USE OF INSULIN (H): ICD-10-CM

## 2025-01-07 DIAGNOSIS — I10 ESSENTIAL HYPERTENSION: ICD-10-CM

## 2025-01-07 DIAGNOSIS — E11.22 TYPE 2 DIABETES MELLITUS WITH STAGE 1 CHRONIC KIDNEY DISEASE, WITHOUT LONG-TERM CURRENT USE OF INSULIN (H): ICD-10-CM

## 2025-01-07 PROCEDURE — G0439 PPPS, SUBSEQ VISIT: HCPCS | Performed by: NURSE PRACTITIONER

## 2025-01-07 PROCEDURE — 99214 OFFICE O/P EST MOD 30 MIN: CPT | Mod: 25 | Performed by: NURSE PRACTITIONER

## 2025-01-07 PROCEDURE — G0008 ADMIN INFLUENZA VIRUS VAC: HCPCS | Performed by: NURSE PRACTITIONER

## 2025-01-07 PROCEDURE — 90662 IIV NO PRSV INCREASED AG IM: CPT | Performed by: NURSE PRACTITIONER

## 2025-01-07 RX ORDER — METFORMIN HYDROCHLORIDE 500 MG/1
1000 TABLET, EXTENDED RELEASE ORAL 2 TIMES DAILY WITH MEALS
Qty: 360 TABLET | Refills: 2 | Status: SHIPPED | OUTPATIENT
Start: 2025-01-07

## 2025-01-07 RX ORDER — HYDROCHLOROTHIAZIDE 12.5 MG/1
CAPSULE ORAL
Qty: 6 EACH | Refills: 3 | Status: SHIPPED | OUTPATIENT
Start: 2025-01-07

## 2025-01-07 RX ORDER — KETOROLAC TROMETHAMINE 30 MG/ML
1 INJECTION, SOLUTION INTRAMUSCULAR; INTRAVENOUS ONCE
Qty: 1 EACH | Refills: 0 | Status: SHIPPED | OUTPATIENT
Start: 2025-01-07 | End: 2025-01-07

## 2025-01-07 RX ORDER — GLIPIZIDE 5 MG/1
5 TABLET ORAL
Qty: 180 TABLET | Refills: 1 | Status: SHIPPED | OUTPATIENT
Start: 2025-01-07

## 2025-01-07 SDOH — HEALTH STABILITY: PHYSICAL HEALTH: ON AVERAGE, HOW MANY DAYS PER WEEK DO YOU ENGAGE IN MODERATE TO STRENUOUS EXERCISE (LIKE A BRISK WALK)?: 3 DAYS

## 2025-01-07 SDOH — HEALTH STABILITY: PHYSICAL HEALTH: ON AVERAGE, HOW MANY MINUTES DO YOU ENGAGE IN EXERCISE AT THIS LEVEL?: 30 MIN

## 2025-01-07 ASSESSMENT — SOCIAL DETERMINANTS OF HEALTH (SDOH): HOW OFTEN DO YOU GET TOGETHER WITH FRIENDS OR RELATIVES?: TWICE A WEEK

## 2025-01-07 NOTE — PROGRESS NOTES
Preventive Care Visit  [facility]  Bernarda Reyez CNP, Internal Medicine  Jan 7, 2025      Assessment & Plan     Encounter for preventive health examination  Counseling: counseling provided regarding nutrition, regular exercise, general safety and periodic health exams   Colonoscopy up to date  Lab work today    Type 2 diabetes mellitus with stage 1 chronic kidney disease, without long-term current use of insulin (H)  -restart Metformin, Glipizide, and Jardiance  -Freestyle Geovanna sent to pharmacy  -patient would like to wait on checking A1c until back on medication   - metFORMIN (GLUCOPHAGE XR) 500 MG 24 hr tablet; Take 2 tablets (1,000 mg) by mouth 2 times daily (with meals).  - glipiZIDE (GLUCOTROL) 5 MG tablet; Take 1 tablet (5 mg) by mouth 2 times daily (before meals).  - empagliflozin (JARDIANCE) 25 MG TABS tablet; Take 1 tablet (25 mg) by mouth daily.  - Continuous Glucose  (FREESTYLE GEOVANNA 3 READER) THOMAS; 1 each once for 1 dose. Use to read blood sugars per 's instructions.  - Continuous Glucose Sensor (FREESTYLE GEOVANNA 3 PLUS SENSOR) MISC; Use 1 sensor every 15 days. Use to read blood sugars per 's instructions.    Hyperlipidemia with target LDL less than 100  -will check lipid with next lab work     Family history of ischemic heart disease  -patient request cardiology referral, strong family history heart disease     - Adult Cardiology Eval  Referral; Future    Family history of cerebral aneurysm    - Adult Cardiology Eval  Referral; Future    Morbid obesity (H)  -working on weight loss with diet/exercise     Essential hypertension  -controlled, not on medication, was previously on Lisinopril    CKD stage 1 due to type 2 diabetes mellitus (H)  -check lab work with next labs    Follow up in 2-3 months for diabetic recheck or sooner as needed.         BMI  Estimated body mass index is 34.66 kg/m  as calculated from the following:     "Height as of this encounter: 1.734 m (5' 8.25\").    Weight as of this encounter: 104.1 kg (229 lb 9.6 oz).   Weight management plan: Discussed healthy diet and exercise guidelines    Counseling  Appropriate preventive services were addressed with this patient via screening, questionnaire, or discussion as appropriate for fall prevention, nutrition, physical activity, Tobacco-use cessation, social engagement, weight loss and cognition.  Checklist reviewing preventive services available has been given to the patient.  Reviewed patient's diet, addressing concerns and/or questions.   He is at risk for lack of exercise and has been provided with information to increase physical activity for the benefit of his well-being.   The patient was provided with written information regarding signs of hearing loss.   Information on urinary incontinence and treatment options given to patient.           Shefali Ovalles is a 69 year old, presenting for the following:  Physical (Diabetic, patient states he is out of diabetic meds )        1/7/2025    10:31 AM   Additional Questions   Roomed by FELIPE Griffin   Accompanied by Self         1/7/2025    10:31 AM   Patient Reported Additional Medications   Patient reports taking the following new medications None         Via the Health Maintenance questionnaire, the patient has reported the following services have been completed , this information has not been sent to the abstraction team.    HPI  Patient presents to the clinic today for annual physical and follow-up.  Patient is diabetic but has been off all of his medications for the past 4 weeks and his they have not refilled them unless he was seen.  Patient reports occasionally he checks  his blood sugar but has not in quite a while.  He is interested in getting a continuous monitor.  Reports history of high blood pressure, was previously on lisinopril, not currently on anything.  Does have strong family history of heart disease, his " dad  at age 63 due to this, mom passed away due to brain aneurysm.            Health Care Directive  Patient does not have a Health Care Directive: Discussed advance care planning with patient; however, patient declined at this time.      2025   General Health   How would you rate your overall physical health? Good   Feel stress (tense, anxious, or unable to sleep) Only a little   (!) STRESS CONCERN      2025   Nutrition   Diet: Regular (no restrictions)    Diabetic       Multiple values from one day are sorted in reverse-chronological order         2025   Exercise   Days per week of moderate/strenous exercise 3 days   Average minutes spent exercising at this level 30 min         2025   Social Factors   Frequency of gathering with friends or relatives Twice a week   Worry food won't last until get money to buy more No   Food not last or not have enough money for food? No   Do you have housing? (Housing is defined as stable permanent housing and does not include staying ouside in a car, in a tent, in an abandoned building, in an overnight shelter, or couch-surfing.) No   Are you worried about losing your housing? No   Lack of transportation? No   Unable to get utilities (heat,electricity)? No   Want help with housing or utility concern? No   (!) HOUSING CONCERN PRESENT      2025   Fall Risk   Fallen 2 or more times in the past year? No    Trouble with walking or balance? No        Proxy-reported          2025   Activities of Daily Living- Home Safety   Needs help with the following daily activites None of the above   Safety concerns in the home None of the above         2025   Dental   Dentist two times every year? Yes         2025   Hearing Screening   Hearing concerns? (!) IT'S HARD TO FOLLOW A CONVERSATION IN A NOISY RESTAURANT OR CROWDED ROOM.         2025   Driving Risk Screening   Patient/family members have concerns about driving No         2025   General  Alertness/Fatigue Screening   Have you been more tired than usual lately? No         1/7/2025   Urinary Incontinence Screening   Bothered by leaking urine in past 6 months Yes            Today's PHQ-2 Score:       1/7/2025    10:25 AM   PHQ-2 ( 1999 Pfizer)   Q1: Little interest or pleasure in doing things 0   Q2: Feeling down, depressed or hopeless 0   PHQ-2 Score 0    Q1: Little interest or pleasure in doing things Not at all   Q2: Feeling down, depressed or hopeless Not at all   PHQ-2 Score 0       Patient-reported           1/7/2025   Substance Use   Alcohol more than 3/day or more than 7/wk No   Do you have a current opioid prescription? No   How severe/bad is pain from 1 to 10? 2/10   Do you use any other substances recreationally? No     Social History     Tobacco Use    Smoking status: Never    Smokeless tobacco: Never   Vaping Use    Vaping status: Never Used   Substance Use Topics    Alcohol use: Yes     Alcohol/week: 3.3 standard drinks of alcohol     Comment: occasional    Drug use: No           1/7/2025   AAA Screening   Family history of Abdominal Aortic Aneurysm (AAA)? Unsure   Last PSA:   PSA   Date Value Ref Range Status   12/29/2020 0.67 0 - 4 ug/L Final     Comment:     Assay Method:  Chemiluminescence using Siemens Vista analyzer     Prostate Specific Antigen Screen   Date Value Ref Range Status   01/25/2023 0.48 0.00 - 4.50 ng/mL Final     ASCVD Risk   The 10-year ASCVD risk score (Flor KNIGHT, et al., 2019) is: 33.6%    Values used to calculate the score:      Age: 69 years      Sex: Male      Is Non- : No      Diabetic: Yes      Tobacco smoker: No      Systolic Blood Pressure: 132 mmHg      Is BP treated: No      HDL Cholesterol: 44 mg/dL      Total Cholesterol: 231 mg/dL            Reviewed and updated as needed this visit by Provider                      Current providers sharing in care for this patient include:  Patient Care Team:  Bernarda Reyez CNP as PCP  "- General (Internal Medicine)  Ramon Owen MD as Assigned PCP  Ileana Ibarra RPH as Pharmacist (Pharmacist)  Ileana Ibarra RPH as Assigned [place] Pharmacist    The following health maintenance items are reviewed in Epic and correct as of today:  Health Maintenance   Topic Date Due    RSV VACCINE (1 - Risk 60-74 years 1-dose series) Never done    Pneumococcal Vaccine: 50+ Years (2 of 2 - PCV) 09/21/2022    MEDICARE ANNUAL WELLNESS VISIT  01/25/2024    DIABETIC FOOT EXAM  01/25/2024    ANNUAL REVIEW OF HM ORDERS  01/25/2024    DTAP/TDAP/TD IMMUNIZATION (2 - Td or Tdap) 04/23/2024    A1C  06/18/2024    CMP  09/18/2024    LIPID  09/18/2024    MICROALBUMIN  09/29/2024    EYE EXAM  11/06/2024    BMP  03/18/2025    FALL RISK ASSESSMENT  01/07/2026    ADVANCE CARE PLANNING  01/07/2030    COLORECTAL CANCER SCREENING  01/10/2030    HEPATITIS C SCREENING  Completed    PHQ-2 (once per calendar year)  Completed    INFLUENZA VACCINE  Completed    URINALYSIS  Completed    ZOSTER IMMUNIZATION  Completed    HPV IMMUNIZATION  Aged Out    MENINGITIS IMMUNIZATION  Aged Out    RSV MONOCLONAL ANTIBODY  Aged Out    COVID-19 Vaccine  Discontinued            Objective    Exam  /74   Pulse 83   Temp 97.5  F (36.4  C) (Oral)   Resp 16   Ht 1.734 m (5' 8.25\")   Wt 104.1 kg (229 lb 9.6 oz)   SpO2 99%   BMI 34.66 kg/m     Estimated body mass index is 34.66 kg/m  as calculated from the following:    Height as of this encounter: 1.734 m (5' 8.25\").    Weight as of this encounter: 104.1 kg (229 lb 9.6 oz).    Physical Exam  Vitals and nursing note reviewed.   Constitutional:       General: He is not in acute distress.     Appearance: Normal appearance. He is not ill-appearing.   HENT:      Head: Normocephalic and atraumatic.      Right Ear: Tympanic membrane, ear canal and external ear normal.      Left Ear: Tympanic membrane, ear canal and external ear normal.      Nose: Nose normal.      Mouth/Throat:      Mouth: Mucous " membranes are moist.      Pharynx: Oropharynx is clear.   Eyes:      Extraocular Movements: Extraocular movements intact.      Conjunctiva/sclera: Conjunctivae normal.      Pupils: Pupils are equal, round, and reactive to light.   Cardiovascular:      Rate and Rhythm: Normal rate and regular rhythm.      Pulses: Normal pulses.      Heart sounds: Normal heart sounds.   Pulmonary:      Effort: Pulmonary effort is normal.      Breath sounds: Normal breath sounds.   Abdominal:      General: Bowel sounds are normal. There is no distension.      Palpations: Abdomen is soft. There is no mass.      Tenderness: There is no abdominal tenderness. There is no guarding.   Musculoskeletal:         General: Normal range of motion.      Cervical back: Normal range of motion.   Skin:     General: Skin is warm and dry.   Neurological:      General: No focal deficit present.      Mental Status: He is alert and oriented to person, place, and time. Mental status is at baseline.   Psychiatric:         Mood and Affect: Mood normal.         Behavior: Behavior normal.               1/7/2025   Mini Cog   Mini-Cog Not Completed (choose reason) Patient declines       Patient declines, there are NO concerns for cognitive deficits.           Signed Electronically by: Bernarda Reyez, CNP

## 2025-03-15 ENCOUNTER — TRANSFERRED RECORDS (OUTPATIENT)
Dept: MULTI SPECIALTY CLINIC | Facility: CLINIC | Age: 70
End: 2025-03-15

## 2025-03-15 LAB — RETINOPATHY: NORMAL

## 2025-03-23 ENCOUNTER — HEALTH MAINTENANCE LETTER (OUTPATIENT)
Age: 70
End: 2025-03-23

## 2025-03-24 ENCOUNTER — MYC REFILL (OUTPATIENT)
Dept: INTERNAL MEDICINE | Facility: CLINIC | Age: 70
End: 2025-03-24
Payer: COMMERCIAL

## 2025-03-24 DIAGNOSIS — N18.1 TYPE 2 DIABETES MELLITUS WITH STAGE 1 CHRONIC KIDNEY DISEASE, WITHOUT LONG-TERM CURRENT USE OF INSULIN (H): ICD-10-CM

## 2025-03-24 DIAGNOSIS — E11.22 TYPE 2 DIABETES MELLITUS WITH STAGE 1 CHRONIC KIDNEY DISEASE, WITHOUT LONG-TERM CURRENT USE OF INSULIN (H): ICD-10-CM

## 2025-03-24 RX ORDER — HYDROCHLOROTHIAZIDE 12.5 MG/1
CAPSULE ORAL
Qty: 6 EACH | Refills: 3 | OUTPATIENT
Start: 2025-03-24

## 2025-03-29 ENCOUNTER — ANCILLARY ORDERS (OUTPATIENT)
Dept: CARDIOLOGY | Facility: CLINIC | Age: 70
End: 2025-03-29
Payer: COMMERCIAL

## 2025-03-29 DIAGNOSIS — Z82.49 FAMILY HISTORY OF ISCHEMIC HEART DISEASE: Primary | ICD-10-CM

## 2025-04-01 ENCOUNTER — TELEPHONE (OUTPATIENT)
Dept: CARDIOLOGY | Facility: CLINIC | Age: 70
End: 2025-04-01
Payer: COMMERCIAL

## 2025-04-01 NOTE — TELEPHONE ENCOUNTER
Received VM from patient returning call asking to leave detailed message as he cannot answer his phone right now. Left detailed VM requesting patient calls radiology at [phone] to reschedule MRAs for at least 10 business days out. Informed pt he can keep CT Ca scan tomorrow as scheduled at 4 pm. Left team 4 direct number [phone] to call with any further questions or concerns.

## 2025-04-01 NOTE — TELEPHONE ENCOUNTER
Received notification that patients MRAs that are scheduled for tomorrow need to be rescheduled at least 10 days out for insurance purposes. Tried to call patient to update him and have him call radiology scheduling at [phone]. No answer. Left VM to call team 4 back at [phone].

## 2025-04-02 ENCOUNTER — HOSPITAL ENCOUNTER (OUTPATIENT)
Dept: CT IMAGING | Facility: CLINIC | Age: 70
Discharge: HOME | End: 2025-04-02
Attending: INTERNAL MEDICINE
Payer: COMMERCIAL

## 2025-04-02 DIAGNOSIS — Z82.49 FAMILY HISTORY OF ISCHEMIC HEART DISEASE: ICD-10-CM

## 2025-04-02 PROCEDURE — 75571 CT HRT W/O DYE W/CA TEST: CPT

## 2025-04-11 ENCOUNTER — HOSPITAL ENCOUNTER (OUTPATIENT)
Dept: MRI IMAGING | Facility: CLINIC | Age: 70
Discharge: HOME | End: 2025-04-11
Attending: INTERNAL MEDICINE | Admitting: INTERNAL MEDICINE
Payer: COMMERCIAL

## 2025-04-11 DIAGNOSIS — Q28.3 OTHER MALFORMATIONS OF CEREBRAL VESSELS: ICD-10-CM

## 2025-04-11 DIAGNOSIS — Z82.49 FAMILY HISTORY OF CEREBRAL ANEURYSM: ICD-10-CM

## 2025-04-11 PROCEDURE — 70544 MR ANGIOGRAPHY HEAD W/O DYE: CPT

## 2025-04-11 PROCEDURE — 255N000002 HC RX 255 OP 636: Performed by: INTERNAL MEDICINE

## 2025-04-11 PROCEDURE — A9585 GADOBUTROL INJECTION: HCPCS | Performed by: INTERNAL MEDICINE

## 2025-04-11 PROCEDURE — 70549 MR ANGIOGRAPH NECK W/O&W/DYE: CPT

## 2025-04-11 RX ORDER — GADOBUTROL 604.72 MG/ML
10 INJECTION INTRAVENOUS ONCE
Status: COMPLETED | OUTPATIENT
Start: 2025-04-11 | End: 2025-04-11

## 2025-04-11 RX ADMIN — GADOBUTROL 10 ML: 604.72 INJECTION INTRAVENOUS at 14:56

## 2025-04-15 ENCOUNTER — OFFICE VISIT (OUTPATIENT)
Dept: INTERNAL MEDICINE | Facility: CLINIC | Age: 70
End: 2025-04-15
Payer: COMMERCIAL

## 2025-04-15 VITALS
SYSTOLIC BLOOD PRESSURE: 150 MMHG | DIASTOLIC BLOOD PRESSURE: 81 MMHG | BODY MASS INDEX: 34.72 KG/M2 | WEIGHT: 229.1 LBS | OXYGEN SATURATION: 99 % | HEART RATE: 82 BPM | HEIGHT: 68 IN | TEMPERATURE: 97.5 F | RESPIRATION RATE: 20 BRPM

## 2025-04-15 DIAGNOSIS — E11.22 CKD STAGE 1 DUE TO TYPE 2 DIABETES MELLITUS (H): ICD-10-CM

## 2025-04-15 DIAGNOSIS — E11.22 TYPE 2 DIABETES MELLITUS WITH STAGE 1 CHRONIC KIDNEY DISEASE, WITHOUT LONG-TERM CURRENT USE OF INSULIN (H): Primary | ICD-10-CM

## 2025-04-15 DIAGNOSIS — E78.5 HYPERLIPIDEMIA WITH TARGET LDL LESS THAN 100: ICD-10-CM

## 2025-04-15 DIAGNOSIS — J84.112 IPF (IDIOPATHIC PULMONARY FIBROSIS) (H): ICD-10-CM

## 2025-04-15 DIAGNOSIS — N18.1 CKD STAGE 1 DUE TO TYPE 2 DIABETES MELLITUS (H): ICD-10-CM

## 2025-04-15 DIAGNOSIS — N18.1 TYPE 2 DIABETES MELLITUS WITH STAGE 1 CHRONIC KIDNEY DISEASE, WITHOUT LONG-TERM CURRENT USE OF INSULIN (H): Primary | ICD-10-CM

## 2025-04-15 DIAGNOSIS — I10 ESSENTIAL HYPERTENSION: ICD-10-CM

## 2025-04-15 LAB
ALBUMIN SERPL BCG-MCNC: 4.3 G/DL (ref 3.5–5.2)
ALP SERPL-CCNC: 57 U/L (ref 40–150)
ALT SERPL W P-5'-P-CCNC: 22 U/L (ref 0–70)
ANION GAP SERPL CALCULATED.3IONS-SCNC: 13 MMOL/L (ref 7–15)
AST SERPL W P-5'-P-CCNC: 20 U/L (ref 0–45)
BILIRUB SERPL-MCNC: 0.5 MG/DL
BUN SERPL-MCNC: 15.1 MG/DL (ref 8–23)
CALCIUM SERPL-MCNC: 9.4 MG/DL (ref 8.8–10.4)
CHLORIDE SERPL-SCNC: 103 MMOL/L (ref 98–107)
CHOLEST SERPL-MCNC: 240 MG/DL
CREAT SERPL-MCNC: 0.92 MG/DL (ref 0.67–1.17)
CREAT UR-MCNC: 90.4 MG/DL
EGFRCR SERPLBLD CKD-EPI 2021: >90 ML/MIN/1.73M2
EST. AVERAGE GLUCOSE BLD GHB EST-MCNC: 223 MG/DL
FASTING STATUS PATIENT QL REPORTED: YES
FASTING STATUS PATIENT QL REPORTED: YES
GLUCOSE SERPL-MCNC: 135 MG/DL (ref 70–99)
HBA1C MFR BLD: 9.4 % (ref 0–5.6)
HCO3 SERPL-SCNC: 23 MMOL/L (ref 22–29)
HDLC SERPL-MCNC: 43 MG/DL
LDLC SERPL CALC-MCNC: 166 MG/DL
MICROALBUMIN UR-MCNC: 36.1 MG/L
MICROALBUMIN/CREAT UR: 39.93 MG/G CR (ref 0–17)
NONHDLC SERPL-MCNC: 197 MG/DL
POTASSIUM SERPL-SCNC: 4.5 MMOL/L (ref 3.4–5.3)
PROT SERPL-MCNC: 7.2 G/DL (ref 6.4–8.3)
SODIUM SERPL-SCNC: 139 MMOL/L (ref 135–145)
TRIGL SERPL-MCNC: 155 MG/DL

## 2025-04-15 PROCEDURE — G2211 COMPLEX E/M VISIT ADD ON: HCPCS | Performed by: NURSE PRACTITIONER

## 2025-04-15 PROCEDURE — 83036 HEMOGLOBIN GLYCOSYLATED A1C: CPT | Performed by: NURSE PRACTITIONER

## 2025-04-15 PROCEDURE — 80061 LIPID PANEL: CPT | Performed by: NURSE PRACTITIONER

## 2025-04-15 PROCEDURE — 99214 OFFICE O/P EST MOD 30 MIN: CPT | Performed by: NURSE PRACTITIONER

## 2025-04-15 PROCEDURE — 3079F DIAST BP 80-89 MM HG: CPT | Performed by: NURSE PRACTITIONER

## 2025-04-15 PROCEDURE — 36415 COLL VENOUS BLD VENIPUNCTURE: CPT | Performed by: NURSE PRACTITIONER

## 2025-04-15 PROCEDURE — 82043 UR ALBUMIN QUANTITATIVE: CPT | Performed by: NURSE PRACTITIONER

## 2025-04-15 PROCEDURE — 80053 COMPREHEN METABOLIC PANEL: CPT | Performed by: NURSE PRACTITIONER

## 2025-04-15 PROCEDURE — 82570 ASSAY OF URINE CREATININE: CPT | Performed by: NURSE PRACTITIONER

## 2025-04-15 PROCEDURE — 3077F SYST BP >= 140 MM HG: CPT | Performed by: NURSE PRACTITIONER

## 2025-04-15 NOTE — PROGRESS NOTES
Assessment & Plan     Type 2 diabetes mellitus with stage 1 chronic kidney disease, without long-term current use of insulin (H)  -check lab work today  -plans to start using CGM when returns from vacation  -continue jardiance, metformin and glipizide     - Albumin Random Urine Quantitative with Creat Ratio; Future  - Hemoglobin A1c; Future  - Lipid panel reflex to direct LDL Fasting; Future  - Comprehensive metabolic panel (BMP + Alb, Alk Phos, ALT, AST, Total. Bili, TP); Future  - Albumin Random Urine Quantitative with Creat Ratio  - Hemoglobin A1c  - Lipid panel reflex to direct LDL Fasting  - Comprehensive metabolic panel (BMP + Alb, Alk Phos, ALT, AST, Total. Bili, TP)    CKD stage 1 due to type 2 diabetes mellitus (H)  -check lab and urine micro     - Lipid panel reflex to direct LDL Fasting; Future  - Comprehensive metabolic panel (BMP + Alb, Alk Phos, ALT, AST, Total. Bili, TP); Future  - Lipid panel reflex to direct LDL Fasting  - Comprehensive metabolic panel (BMP + Alb, Alk Phos, ALT, AST, Total. Bili, TP)    Essential hypertension  -slightly up today, was previously on Lisinopril-consider restarting     - Lipid panel reflex to direct LDL Fasting; Future  - Comprehensive metabolic panel (BMP + Alb, Alk Phos, ALT, AST, Total. Bili, TP); Future  - Lipid panel reflex to direct LDL Fasting  - Comprehensive metabolic panel (BMP + Alb, Alk Phos, ALT, AST, Total. Bili, TP)    Hyperlipidemia with target LDL less than 100  -check lipid  -due to elevated calcium score would recommend statin, cardiology recommended Crestor 10mg daily     - Lipid panel reflex to direct LDL Fasting; Future  - Comprehensive metabolic panel (BMP + Alb, Alk Phos, ALT, AST, Total. Bili, TP); Future  - Lipid panel reflex to direct LDL Fasting  - Comprehensive metabolic panel (BMP + Alb, Alk Phos, ALT, AST, Total. Bili, TP)    IPF (idiopathic pulmonary fibrosis) (H)-noted on CT calcium score, asymptomatic  -noted on recent calcium  "score  -patient currently asymptomatic  -discussed with patient, consider referral to pulm in future     Recommend follow-up in 3 months for diabetic recheck or sooner as needed.    The longitudinal plan of care for the diagnosis(es)/condition(s) as documented were addressed during this visit. Due to the added complexity in care, I will continue to support Josr in the subsequent management and with ongoing continuity of care.       Subjective   Josr is a 69 year old, presenting for the following health issues:  Results (Review test exam result Cat scan.) and A1C (Need A1C labs.)        4/15/2025     8:08 AM   Additional Questions   Roomed by JOEY Gagnon   Accompanied by Self     Via the Health Maintenance questionnaire, the patient has reported the following services have been completed , this information has been sent to the abstraction team.  History of Present Illness       Reason for visit:  Med check and blood He is missing 3 dose(s) of medications per week.  He is not taking prescribed medications regularly due to other.      Josr presents to the clinic today for diabetic recheck.  He reports he has not been checking his blood sugars too often, when he does occasionally check them they are running anywhere from 120-160.  He reports diet has been okay, he is taking the Jardiance, glipizide and metformin again.  He does have CGM but plans to put this on when he gets back from vacation.  He did see cardiology and had CT calcium score which came back elevated.  They did recommend taking a statin.  They also noted pulmonary fibrosis on his CT calcium score.  Patient denies any shortness of breath, reports activity level has been normal without any breathing problems.  No previous exposure to anything he is aware of.                  Objective    BP (!) 150/81 (BP Location: Right arm, Patient Position: Sitting, Cuff Size: Adult Large)   Pulse 82   Temp 97.5  F (36.4  C) (Oral)   Resp 20   Ht 1.727 m (5' 8\")   Wt " 103.9 kg (229 lb 1.6 oz)   SpO2 99%   BMI 34.83 kg/m    Body mass index is 34.83 kg/m .  Physical Exam  Vitals and nursing note reviewed.   Constitutional:       General: He is not in acute distress.     Appearance: Normal appearance. He is not ill-appearing.   HENT:      Head: Normocephalic and atraumatic.   Neurological:      General: No focal deficit present.      Mental Status: He is alert and oriented to person, place, and time. Mental status is at baseline.   Psychiatric:         Mood and Affect: Mood normal.         Behavior: Behavior normal.                    Signed Electronically by: Bernarda Reyez, CNP

## 2025-04-16 ENCOUNTER — TELEPHONE (OUTPATIENT)
Dept: INTERNAL MEDICINE | Facility: CLINIC | Age: 70
End: 2025-04-16
Payer: COMMERCIAL

## 2025-04-16 PROBLEM — J84.112 IPF (IDIOPATHIC PULMONARY FIBROSIS) (H): Status: ACTIVE | Noted: 2025-04-16

## 2025-04-16 NOTE — TELEPHONE ENCOUNTER
Bernarda Reyez, CNP  4/15/2025  8:17 PM CDT Back to Top      Please call patient and let him know A1c is up to 9.4.  Can you see if he would be willing to add once a week injection of glp 1 such as Ozempic or Trulicity (whatever his insurance will cover) to help control blood sugars better?  Urine shows a little more protein due to uncontrolled blood sugars having effect on kidneys.  Kidney function remains normal, electrolytes are normal.  Cholesterol came back elevated, I know cardiology wanted him to take a statin (crestor) with his elevated CT score, can we find out if he ever started this or has this?       I would like for him to follow up with me in 3 months to repeat A1c and see where blood sugars are running.

## 2025-04-16 NOTE — TELEPHONE ENCOUNTER
Left message on cell/home voicemail asking patient to return call to clinic and ask for any triage nurse.  ROBBY Mahoney R.N.

## 2025-04-16 NOTE — TELEPHONE ENCOUNTER
Advised patient of provider recommendation via telephone.   Patient does not want to add an injectable. He has not been consistent with medication so he wants to try that first. Patient reports that he is leaving for vacation soon and has not started the statin, he was going to wait till after his return. Writer advised to call cardiology to get medication prescribed as it does not appear to be on humberto list. Patient verbalized understanding. Scheduled patient for follow-up    Appointments in Next Year      May 30, 2025 8:00 AM  (Arrive by 7:55 AM)  Return Preventative with MAHNAZ Craig CNP  [facility] ([facility]) [phone]     Jul 29, 2025 7:30 AM  (Arrive by 7:10 AM)  Provider Visit with Bernarda Reyez CNP  [facility] ([facility] ) [phone]           Ginny ALEXANDER 11:24 AM April 16, 2025   [facility]

## 2025-06-13 ENCOUNTER — MYC MEDICAL ADVICE (OUTPATIENT)
Dept: INTERNAL MEDICINE | Facility: CLINIC | Age: 70
End: 2025-06-13
Payer: COMMERCIAL

## 2025-06-13 DIAGNOSIS — J84.112 IPF (IDIOPATHIC PULMONARY FIBROSIS) (H): Primary | ICD-10-CM

## 2025-06-16 NOTE — TELEPHONE ENCOUNTER
"Per 4/15/25 OV Note \"IPF (idiopathic pulmonary fibrosis) (H)-noted on CT calcium score, asymptomatic  -noted on recent calcium score  -patient currently asymptomatic  -discussed with patient, consider referral to pulm in future \"    Please advise if able to send pulmonology referral.     Ginny RN 8:01 AM June 16, 2025   [facility]    "

## 2025-06-17 ENCOUNTER — TELEPHONE (OUTPATIENT)
Dept: PULMONOLOGY | Facility: CLINIC | Age: 70
End: 2025-06-17
Payer: COMMERCIAL

## 2025-06-17 NOTE — CONFIDENTIAL NOTE
ILD New Patient Referral: Pre visit communication    Patient: Bigg Lilly  Reason for Referral: IPF  Referring Physician:   Bernarda Reyez, CNP   [address] [facility] [address]   Phone: [phone]       Chest CT scan: CT calcium screen 4/2/25   Biopsy: No  PFT's:No  Additional testing:     Current symptoms: None  Current related prescriptions:     Supplemental oxygen? No    In review of apparent records and conversation with patient; recommend first visit with ILD provider be conducted :  In person visit  Testing needed: CT scan and Full PFT's and walk    Additional notes:

## 2025-06-25 ENCOUNTER — PATIENT OUTREACH (OUTPATIENT)
Dept: INTERNAL MEDICINE | Facility: CLINIC | Age: 70
End: 2025-06-25
Payer: COMMERCIAL

## 2025-06-25 PROBLEM — Z28.39 IMMUNIZATION DEFICIENCY: Status: RESOLVED | Noted: 2023-01-25 | Resolved: 2025-06-25

## 2025-06-25 PROBLEM — E11.3293 MILD NONPROLIFERATIVE DIABETIC RETINOPATHY OF BOTH EYES WITHOUT MACULAR EDEMA ASSOCIATED WITH TYPE 2 DIABETES MELLITUS (H): Status: ACTIVE | Noted: 2025-02-13

## 2025-06-25 NOTE — TELEPHONE ENCOUNTER
Patient Quality Outreach    Patient is due for the following:   Diabetes -  Diabetic Follow-Up Visit and Foot Exam      Topic Date Due    Pneumococcal Vaccine (2 of 2 - PCV) 09/21/2022    Diptheria Tetanus Pertussis (DTAP/TDAP/TD) Vaccine (2 - Td or Tdap) 04/23/2024       Action(s) Taken:   Patient has upcoming appointment, these items will be addressed at that time.    Type of outreach:    Chart review performed, no outreach needed.    Questions for provider review:    None         Hope L Rule, LPN  Chart routed to self.

## 2025-07-27 ENCOUNTER — HEALTH MAINTENANCE LETTER (OUTPATIENT)
Age: 70
End: 2025-07-27